# Patient Record
Sex: FEMALE | Race: WHITE | Employment: OTHER | URBAN - METROPOLITAN AREA
[De-identification: names, ages, dates, MRNs, and addresses within clinical notes are randomized per-mention and may not be internally consistent; named-entity substitution may affect disease eponyms.]

---

## 2017-01-10 ENCOUNTER — HOSPITAL ENCOUNTER (EMERGENCY)
Facility: HOSPITAL | Age: 82
Discharge: HOME | End: 2017-01-11
Attending: EMERGENCY MEDICINE
Payer: MEDICARE

## 2017-01-10 ENCOUNTER — APPOINTMENT (OUTPATIENT)
Dept: GENERAL RADIOLOGY | Facility: HOSPITAL | Age: 82
End: 2017-01-10
Attending: EMERGENCY MEDICINE
Payer: MEDICARE

## 2017-01-10 ENCOUNTER — APPOINTMENT (OUTPATIENT)
Dept: GENERAL RADIOLOGY | Facility: HOSPITAL | Age: 82
End: 2017-01-10
Payer: MEDICARE

## 2017-01-10 DIAGNOSIS — S52.501A CLOSED FRACTURE OF DISTAL END OF RIGHT RADIUS, UNSPECIFIED FRACTURE MORPHOLOGY, INITIAL ENCOUNTER: Primary | ICD-10-CM

## 2017-01-10 PROCEDURE — 73130 X-RAY EXAM OF HAND: CPT

## 2017-01-10 PROCEDURE — 99284 EMERGENCY DEPT VISIT MOD MDM: CPT

## 2017-01-10 PROCEDURE — 73110 X-RAY EXAM OF WRIST: CPT

## 2017-01-10 PROCEDURE — 96374 THER/PROPH/DIAG INJ IV PUSH: CPT

## 2017-01-10 RX ORDER — ONDANSETRON 2 MG/ML
4 INJECTION INTRAMUSCULAR; INTRAVENOUS ONCE
Status: COMPLETED | OUTPATIENT
Start: 2017-01-10 | End: 2017-01-10

## 2017-01-11 ENCOUNTER — APPOINTMENT (OUTPATIENT)
Dept: GENERAL RADIOLOGY | Facility: HOSPITAL | Age: 82
End: 2017-01-11
Attending: EMERGENCY MEDICINE
Payer: MEDICARE

## 2017-01-11 ENCOUNTER — APPOINTMENT (OUTPATIENT)
Dept: CT IMAGING | Facility: HOSPITAL | Age: 82
End: 2017-01-11
Attending: EMERGENCY MEDICINE
Payer: MEDICARE

## 2017-01-11 VITALS
TEMPERATURE: 98 F | DIASTOLIC BLOOD PRESSURE: 68 MMHG | HEART RATE: 85 BPM | WEIGHT: 150 LBS | SYSTOLIC BLOOD PRESSURE: 127 MMHG | OXYGEN SATURATION: 96 % | RESPIRATION RATE: 18 BRPM | BODY MASS INDEX: 24.11 KG/M2 | HEIGHT: 66 IN

## 2017-01-11 LAB
ALBUMIN SERPL-MCNC: 3.4 G/DL (ref 3.5–4.8)
ALP LIVER SERPL-CCNC: 74 U/L (ref 55–142)
ALT SERPL-CCNC: 15 U/L (ref 14–54)
AST SERPL-CCNC: 16 U/L (ref 15–41)
BASOPHILS # BLD AUTO: 0.04 X10(3) UL (ref 0–0.1)
BASOPHILS NFR BLD AUTO: 0.5 %
BILIRUB SERPL-MCNC: 0.3 MG/DL (ref 0.1–2)
BUN BLD-MCNC: 34 MG/DL (ref 8–20)
CALCIUM BLD-MCNC: 8.5 MG/DL (ref 8.3–10.3)
CHLORIDE: 103 MMOL/L (ref 101–111)
CO2: 31 MMOL/L (ref 22–32)
CREAT BLD-MCNC: 1.58 MG/DL (ref 0.55–1.02)
EOSINOPHIL # BLD AUTO: 0.3 X10(3) UL (ref 0–0.3)
EOSINOPHIL NFR BLD AUTO: 4.1 %
ERYTHROCYTE [DISTWIDTH] IN BLOOD BY AUTOMATED COUNT: 14.4 % (ref 11.5–16)
GLUCOSE BLD-MCNC: 144 MG/DL (ref 70–99)
HCT VFR BLD AUTO: 35 % (ref 34–50)
HGB BLD-MCNC: 11.5 G/DL (ref 12–16)
IMMATURE GRANULOCYTE COUNT: 0.04 X10(3) UL (ref 0–1)
IMMATURE GRANULOCYTE RATIO %: 0.5 %
INR BLD: 2 (ref 0.89–1.12)
LYMPHOCYTES # BLD AUTO: 1.38 X10(3) UL (ref 0.9–4)
LYMPHOCYTES NFR BLD AUTO: 18.8 %
M PROTEIN MFR SERPL ELPH: 6.6 G/DL (ref 6.1–8.3)
MCH RBC QN AUTO: 28.5 PG (ref 27–33.2)
MCHC RBC AUTO-ENTMCNC: 32.9 G/DL (ref 31–37)
MCV RBC AUTO: 86.6 FL (ref 81–100)
MONOCYTES # BLD AUTO: 0.6 X10(3) UL (ref 0.1–0.6)
MONOCYTES NFR BLD AUTO: 8.2 %
NEUTROPHIL ABS PRELIM: 5 X10 (3) UL (ref 1.3–6.7)
NEUTROPHILS # BLD AUTO: 5 X10(3) UL (ref 1.3–6.7)
NEUTROPHILS NFR BLD AUTO: 67.9 %
PLATELET # BLD AUTO: 170 10(3)UL (ref 150–450)
POTASSIUM SERPL-SCNC: 4.2 MMOL/L (ref 3.6–5.1)
PSA SERPL DL<=0.01 NG/ML-MCNC: 23.4 SECONDS (ref 12.3–14.8)
RBC # BLD AUTO: 4.04 X10(6)UL (ref 3.8–5.1)
RED CELL DISTRIBUTION WIDTH-SD: 45.4 FL (ref 35.1–46.3)
SODIUM SERPL-SCNC: 138 MMOL/L (ref 136–144)
WBC # BLD AUTO: 7.4 X10(3) UL (ref 4–13)

## 2017-01-11 PROCEDURE — 70450 CT HEAD/BRAIN W/O DYE: CPT

## 2017-01-11 PROCEDURE — 85025 COMPLETE CBC W/AUTO DIFF WBC: CPT | Performed by: EMERGENCY MEDICINE

## 2017-01-11 PROCEDURE — 80053 COMPREHEN METABOLIC PANEL: CPT | Performed by: EMERGENCY MEDICINE

## 2017-01-11 PROCEDURE — 85610 PROTHROMBIN TIME: CPT | Performed by: EMERGENCY MEDICINE

## 2017-01-11 RX ORDER — HYDROCODONE BITARTRATE AND ACETAMINOPHEN 5; 325 MG/1; MG/1
1 TABLET ORAL EVERY 6 HOURS PRN
Qty: 20 TABLET | Refills: 0 | Status: SHIPPED | OUTPATIENT
Start: 2017-01-11 | End: 2017-01-17

## 2017-01-11 NOTE — ED INITIAL ASSESSMENT (HPI)
Pt tripped in hallway and landed on right hand. Pain to right wrist. Denies loss of consciousness or head injury.

## 2017-01-11 NOTE — ED PROVIDER NOTES
Patient Seen in: [facility] Emergency Department    History   Patient presents with:  Fall (musculoskeletal, neurologic)    Stated Complaint: fall    HPI    The patient is an 42-year-old female who is on Coumadin for atrial fibrillation, complaining that seems to be an adenoma and  is stable on CT scans   • HIGH BLOOD PRESSURE    • COPD    • Visual impairment    • Osteoarthrosis, unspecified whether generalized or localized, unspecified site    • Diverticulosis of colon (without mention of hemorrhage) 40mg (4 tabs) tonight then 4 tabs tomorrow twice daily x 1 day, then 3 tabs twice daily x 3 days, then 4 tabs daily x 3 days, then 2 tabs daily x 3 days, then 1 tab daily x 1 day, then STOP. ALWAYS TAKE WITH FOOD.    DULoxetine HCl 30 MG Oral Cap DR Toribio reviewed. All other systems reviewed and negative except as noted above. PSFH elements reviewed from today and agreed except as otherwise stated in HPI.     Physical Exam       ED Triage Vitals   BP 01/10/17 2339 127/68 mmHg   Pulse 01/10/17 2337 85 COMP METABOLIC PANEL (14) - Abnormal; Notable for the following:     Glucose 144 (*)     BUN 34 (*)     Creatinine 1.58 (*)     GFR 29 (*)     Albumin 3.4 (*)     All other components within normal limits   PROTHROMBIN TIME (PT) - Abnormal; Notable for t is stable for outpatient referral to orthopedics. She was provided prescription for Norco which she has tolerated in the past, she was encouraged to return if her symptoms worsen, otherwise she will follow-up with orthopedics as an outpatient.   She was [facility]

## 2017-01-24 PROBLEM — S52.501D CLOSED FRACTURE OF DISTAL END OF RIGHT RADIUS WITH ROUTINE HEALING, UNSPECIFIED FRACTURE MORPHOLOGY, SUBSEQUENT ENCOUNTER: Status: ACTIVE | Noted: 2017-01-24

## 2017-01-25 ENCOUNTER — TELEPHONE (OUTPATIENT)
Dept: INTERNAL MEDICINE CLINIC | Facility: CLINIC | Age: 82
End: 2017-01-25

## 2017-01-26 NOTE — TELEPHONE ENCOUNTER
LFV:10/11/16 with TB  Future Appt: none on file  Last Rx:1/24/17 for #30 Dr. Farhana Roach  Last Labs:1/11/17 cbc and cmp abnormal  Per protocol called Raj Mata, she indicated that they just picked up the prescription and filled from Farhana Roach yesterday, advised cannot r

## 2017-02-09 RX ORDER — HYDROCODONE BITARTRATE AND ACETAMINOPHEN 5; 300 MG/1; MG/1
1 TABLET ORAL EVERY 8 HOURS PRN
Qty: 90 TABLET | Refills: 0 | Status: SHIPPED | OUTPATIENT
Start: 2017-02-09 | End: 2017-03-09

## 2017-02-09 NOTE — TELEPHONE ENCOUNTER
LFV:10/11/16 with TB  Future Appt: none on file  Last Rx:12/27/16 for 90 days  Last Labs:1/11/17 cbc and cmp abnormal  Per protocol to provider

## 2017-03-09 RX ORDER — HYDROCODONE BITARTRATE AND ACETAMINOPHEN 5; 300 MG/1; MG/1
1 TABLET ORAL EVERY 8 HOURS PRN
Qty: 90 TABLET | Refills: 0 | Status: SHIPPED | OUTPATIENT
Start: 2017-03-09 | End: 2017-04-07

## 2017-03-09 NOTE — TELEPHONE ENCOUNTER
LFV:10/11/16 with TB  Future Appt: none on file  Last Rx:2/9/17 for 90 days  Last Labs:1/11/17 cbc and cmp abnormal  Per protocol to provider

## 2017-03-14 ENCOUNTER — PRIOR ORIGINAL RECORDS (OUTPATIENT)
Dept: OTHER | Age: 82
End: 2017-03-14

## 2017-03-27 RX ORDER — ONDANSETRON 4 MG/1
TABLET, FILM COATED ORAL
Qty: 30 TABLET | Refills: 3 | Status: SHIPPED | OUTPATIENT
Start: 2017-03-27 | End: 2017-07-11

## 2017-04-05 ENCOUNTER — TELEPHONE (OUTPATIENT)
Dept: INTERNAL MEDICINE CLINIC | Facility: CLINIC | Age: 82
End: 2017-04-05

## 2017-04-05 RX ORDER — HYDROCODONE BITARTRATE AND ACETAMINOPHEN 5; 300 MG/1; MG/1
1 TABLET ORAL EVERY 8 HOURS PRN
Qty: 90 TABLET | Refills: 0 | OUTPATIENT
Start: 2017-04-05

## 2017-04-05 NOTE — TELEPHONE ENCOUNTER
LOV-10/11/16 TB  FOV-none  LAST RX-3/19/17 90 tabs 0 refills  LAST LABS-1/11/17  PER PROTOCOL-to provider.

## 2017-04-05 NOTE — TELEPHONE ENCOUNTER
CG note says 3/19 last refill and it was actually 3/9 so will fill it Friday  Still due for appointment though

## 2017-04-06 NOTE — TELEPHONE ENCOUNTER
Future Appointments  Date Time Provider Radha Mckeon   4/13/2017 4:30 PM Lyubov Dhillon MD [facility]

## 2017-04-07 ENCOUNTER — TELEPHONE (OUTPATIENT)
Dept: INTERNAL MEDICINE CLINIC | Facility: CLINIC | Age: 82
End: 2017-04-07

## 2017-04-07 RX ORDER — HYDROCODONE BITARTRATE AND ACETAMINOPHEN 5; 300 MG/1; MG/1
1 TABLET ORAL EVERY 8 HOURS PRN
Qty: 90 TABLET | Refills: 0 | Status: SHIPPED | OUTPATIENT
Start: 2017-04-07 | End: 2017-05-12

## 2017-04-07 NOTE — TELEPHONE ENCOUNTER
Daughter Bluford Duane [phone] calling to  on request for script. Let her know it was being looked at.

## 2017-04-19 NOTE — TELEPHONE ENCOUNTER
Pt's daughter called to let us know she is currently in rehab and is not able to  the script  PLEASE leave it and she will come get it as soon as she is able

## 2017-04-20 NOTE — TELEPHONE ENCOUNTER
Pt states she can't get anyone to  the prescription. Pt notified the script has bee mailed to her home. Pt verbalizes understanding.

## 2017-04-20 NOTE — TELEPHONE ENCOUNTER
Patient calling stating that her daughter is in rehab informed patient that daughter called yesterday and will be picking up as soon as she can. Patient states that she needs her pills now and she is unable to come and get the script.  Wants to know if it c

## 2017-04-20 NOTE — TELEPHONE ENCOUNTER
Spoke to pt to let her know her script is at the  for p/u. Explained we cannot call this type of medication in to the pharmacy.   Pt states her dtr is currently in Rehab, was supposed to come home this weekend but doctor told her she isn't making

## 2017-04-20 NOTE — TELEPHONE ENCOUNTER
If she is in rehab, the rehab doctor should be ordering her chronic medications including pain medications.

## 2017-05-01 RX ORDER — IPRATROPIUM/ALBUTEROL SULFATE 20-100 MCG
MIST INHALER (GRAM) INHALATION
Qty: 1 INHALER | Refills: 2 | Status: SHIPPED | OUTPATIENT
Start: 2017-05-01 | End: 2018-05-04

## 2017-05-01 RX ORDER — DULOXETIN HYDROCHLORIDE 30 MG/1
CAPSULE, DELAYED RELEASE ORAL
Qty: 90 CAPSULE | Refills: 0 | Status: ON HOLD | OUTPATIENT
Start: 2017-05-01 | End: 2017-09-10 | Stop reason: DRUGHIGH

## 2017-05-05 ENCOUNTER — TELEPHONE (OUTPATIENT)
Dept: INTERNAL MEDICINE CLINIC | Facility: CLINIC | Age: 82
End: 2017-05-05

## 2017-05-05 RX ORDER — FOLIC ACID 1 MG/1
TABLET ORAL
Qty: 90 TABLET | Refills: 0 | Status: SHIPPED | OUTPATIENT
Start: 2017-05-05 | End: 2017-07-31

## 2017-05-12 ENCOUNTER — TELEPHONE (OUTPATIENT)
Dept: INTERNAL MEDICINE CLINIC | Facility: CLINIC | Age: 82
End: 2017-05-12

## 2017-05-12 RX ORDER — HYDROCODONE BITARTRATE AND ACETAMINOPHEN 5; 300 MG/1; MG/1
1 TABLET ORAL EVERY 8 HOURS PRN
Qty: 90 TABLET | Refills: 0 | Status: SHIPPED | OUTPATIENT
Start: 2017-05-12 | End: 2017-06-13

## 2017-05-12 NOTE — TELEPHONE ENCOUNTER
Pt is here ( at ) stating that she never received prescription and would like to have TB reprint Rx for Hydrocodone-Acetaminophen 5-300 MG Oral Tab and discard the old prescription of received.

## 2017-05-31 RX ORDER — FOLIC ACID 1 MG/1
TABLET ORAL
Qty: 90 TABLET | Refills: 0 | OUTPATIENT
Start: 2017-05-31

## 2017-06-13 RX ORDER — HYDROCODONE BITARTRATE AND ACETAMINOPHEN 5; 300 MG/1; MG/1
1 TABLET ORAL EVERY 8 HOURS PRN
Qty: 90 TABLET | Refills: 0 | Status: SHIPPED | OUTPATIENT
Start: 2017-06-13 | End: 2017-07-12

## 2017-06-13 NOTE — TELEPHONE ENCOUNTER
LOV: 10/11/16 w/ TB TCM  FOV: NFV  Last labs: 1/11/17 CBC,CMP,PT  Last Refill: 5/12/17 qt:90  Per protocol sent to provider

## 2017-06-13 NOTE — TELEPHONE ENCOUNTER
Prescription was print and signed. ..  I called pt and gave message to daughter she stated she would come and  tomorrow

## 2017-07-12 RX ORDER — ONDANSETRON 4 MG/1
TABLET, FILM COATED ORAL
Qty: 30 TABLET | Refills: 2 | Status: SHIPPED | OUTPATIENT
Start: 2017-07-12 | End: 2017-08-31

## 2017-07-12 NOTE — TELEPHONE ENCOUNTER
I will give refill tomorrow when I am in office but needs to make 30 min f/u within a month, last OV many months ago

## 2017-07-12 NOTE — TELEPHONE ENCOUNTER
LOV: 10/11/16  Future office visit: No upcoming visit   Last labs: 1/11/17 Cmp Cbc   Last RX: 3/27/17 #30 #3 Refills  Per protocol: Route to provider

## 2017-07-12 NOTE — TELEPHONE ENCOUNTER
LOV: 10/11/16  Future office visit: no upcoming visit   Last labs: 1/11/17 Cmp Cbc   Last RX: 6/13/17 #90 No Refills  Per protocol: Route to provider

## 2017-07-13 RX ORDER — HYDROCODONE BITARTRATE AND ACETAMINOPHEN 5; 300 MG/1; MG/1
1 TABLET ORAL EVERY 8 HOURS PRN
Qty: 90 TABLET | Refills: 0 | Status: SHIPPED | OUTPATIENT
Start: 2017-07-13 | End: 2017-08-14

## 2017-07-13 NOTE — TELEPHONE ENCOUNTER
Future Appointments  Date Time Provider Radha Mckeon   7/31/2017 2:45 PM Bryn Thakur MD [facility]     GABRIELLA:[id number]

## 2017-07-31 ENCOUNTER — OFFICE VISIT (OUTPATIENT)
Dept: INTERNAL MEDICINE CLINIC | Facility: CLINIC | Age: 82
End: 2017-07-31

## 2017-07-31 ENCOUNTER — LABORATORY ENCOUNTER (OUTPATIENT)
Dept: LAB | Age: 82
End: 2017-07-31
Attending: INTERNAL MEDICINE
Payer: MEDICARE

## 2017-07-31 VITALS
WEIGHT: 150 LBS | TEMPERATURE: 99 F | DIASTOLIC BLOOD PRESSURE: 68 MMHG | RESPIRATION RATE: 17 BRPM | BODY MASS INDEX: 24.69 KG/M2 | SYSTOLIC BLOOD PRESSURE: 134 MMHG | HEIGHT: 65.5 IN | OXYGEN SATURATION: 97 % | HEART RATE: 61 BPM

## 2017-07-31 DIAGNOSIS — N18.30 CKD (CHRONIC KIDNEY DISEASE) STAGE 3, GFR 30-59 ML/MIN (HCC): ICD-10-CM

## 2017-07-31 DIAGNOSIS — E11.40 CONTROLLED TYPE 2 DIABETES WITH NEUROPATHY (HCC): ICD-10-CM

## 2017-07-31 DIAGNOSIS — I48.20 CHRONIC ATRIAL FIBRILLATION (HCC): ICD-10-CM

## 2017-07-31 DIAGNOSIS — E11.40 CONTROLLED TYPE 2 DIABETES WITH NEUROPATHY (HCC): Primary | ICD-10-CM

## 2017-07-31 DIAGNOSIS — I10 ESSENTIAL HYPERTENSION: ICD-10-CM

## 2017-07-31 DIAGNOSIS — J44.9 CHRONIC OBSTRUCTIVE PULMONARY DISEASE, UNSPECIFIED COPD TYPE (HCC): ICD-10-CM

## 2017-07-31 LAB
ALBUMIN SERPL-MCNC: 3 G/DL (ref 3.5–4.8)
ALP LIVER SERPL-CCNC: 77 U/L (ref 55–142)
ALT SERPL-CCNC: 14 U/L (ref 14–54)
AST SERPL-CCNC: 14 U/L (ref 15–41)
BILIRUB SERPL-MCNC: 0.3 MG/DL (ref 0.1–2)
BUN BLD-MCNC: 25 MG/DL (ref 8–20)
CALCIUM BLD-MCNC: 9.5 MG/DL (ref 8.3–10.3)
CHLORIDE: 102 MMOL/L (ref 101–111)
CO2: 31 MMOL/L (ref 22–32)
CREAT BLD-MCNC: 1.36 MG/DL (ref 0.55–1.02)
CREAT UR-SCNC: 210 MG/DL
EST. AVERAGE GLUCOSE BLD GHB EST-MCNC: 114 MG/DL (ref 68–126)
GLUCOSE BLD-MCNC: 132 MG/DL (ref 70–99)
HBA1C MFR BLD HPLC: 5.6 % (ref ?–5.7)
INR BLD: 1.51 (ref 0.89–1.11)
M PROTEIN MFR SERPL ELPH: 6.4 G/DL (ref 6.1–8.3)
MICROALBUMIN UR-MCNC: 3.36 MG/DL
MICROALBUMIN/CREAT 24H UR-RTO: 16 UG/MG (ref ?–30)
POTASSIUM SERPL-SCNC: 5.1 MMOL/L (ref 3.6–5.1)
PSA SERPL DL<=0.01 NG/ML-MCNC: 18.4 SECONDS (ref 12–14.3)
SODIUM SERPL-SCNC: 139 MMOL/L (ref 136–144)

## 2017-07-31 PROCEDURE — 85610 PROTHROMBIN TIME: CPT

## 2017-07-31 PROCEDURE — 99214 OFFICE O/P EST MOD 30 MIN: CPT | Performed by: INTERNAL MEDICINE

## 2017-07-31 PROCEDURE — 82043 UR ALBUMIN QUANTITATIVE: CPT

## 2017-07-31 PROCEDURE — 82570 ASSAY OF URINE CREATININE: CPT

## 2017-07-31 PROCEDURE — 83036 HEMOGLOBIN GLYCOSYLATED A1C: CPT

## 2017-07-31 PROCEDURE — 80053 COMPREHEN METABOLIC PANEL: CPT

## 2017-07-31 RX ORDER — HYDROCODONE BITARTRATE AND ACETAMINOPHEN 5; 300 MG/1; MG/1
1 TABLET ORAL EVERY 8 HOURS PRN
Qty: 90 TABLET | Refills: 0 | Status: CANCELLED | OUTPATIENT
Start: 2017-07-31

## 2017-07-31 NOTE — PROGRESS NOTES
Boone Donaldson is a 80year old female. Patient presents with:   Follow - Up: for pain med  Atrial Fibrillation: needs inr done  Diabetes      HPI:     Patient with chronic a fib, COPD on oxygen, DM2 with chronic LE pain/neuropathy here after 9 months fo thoracic vertebra (HCC)     Hip pain     Adenoma of right adrenal gland     Acute bronchitis     Benign essential HTN     Diabetes mellitus type 2 in nonobese (HCC)     At risk for falling     CKD (chronic kidney disease) stage 3, GFR 30-59 ml/min     Hist (VITAMIN D) 1000 UNITS Oral Tab Take 1 tablet by mouth daily. Disp:  Rfl:    Warfarin Sodium (COUMADIN) 5 MG Oral Tab Take 1 tablet by mouth As Directed.  MON, TUES, WED, THURS, FRI, SAT  Disp:  Rfl:    Warfarin Sodium 2.5 MG Oral Tab Take 1 tablet by mouth • Pacemaker     Pacemaker placed in 2004 at [facility]   • PONV (postoperative nausea and vomiting)    • Right adrenal mass ([address]     Dx in 2011: right adrenal mass measuring between 1 and 1.5 that seems to be an adenoma and  is stable on CT scans   • Subcli 65.5\"   Wt 150 lb   SpO2 97%   BMI 24.58 kg/m²   GENERAL: well developed, well nourished,in no apparent distress  SKIN: eczema back   HEENT: atraumatic, normocephalic,ears and throat are clear  NECK: supple,no adenopathy  LUNGS: dec BS b/l, wearing oxygen

## 2017-08-14 RX ORDER — HYDROCODONE BITARTRATE AND ACETAMINOPHEN 5; 300 MG/1; MG/1
1 TABLET ORAL EVERY 8 HOURS PRN
Qty: 90 TABLET | Refills: 0 | Status: SHIPPED | OUTPATIENT
Start: 2017-08-14 | End: 2017-09-18

## 2017-08-14 NOTE — TELEPHONE ENCOUNTER
Med pended  Per protocol to provider    LOV: 7/31/17- with TB for diabetes  Last rx: 7/13/17. 90 tablets with 0 refills

## 2017-08-14 NOTE — TELEPHONE ENCOUNTER
Daughter Vergia Samples called stating pt needs new rx for Hydrocodone-Acetaminophen 5-300 MG Oral Tab and Vergia Samples will be here tomorrow and she will pick it up then

## 2017-08-16 ENCOUNTER — APPOINTMENT (OUTPATIENT)
Dept: GENERAL RADIOLOGY | Facility: HOSPITAL | Age: 82
End: 2017-08-16
Attending: EMERGENCY MEDICINE
Payer: MEDICARE

## 2017-08-16 ENCOUNTER — HOSPITAL ENCOUNTER (EMERGENCY)
Facility: HOSPITAL | Age: 82
Discharge: HOME | End: 2017-08-16
Attending: EMERGENCY MEDICINE
Payer: MEDICARE

## 2017-08-16 VITALS
HEART RATE: 62 BPM | SYSTOLIC BLOOD PRESSURE: 119 MMHG | BODY MASS INDEX: 25 KG/M2 | WEIGHT: 149.94 LBS | RESPIRATION RATE: 16 BRPM | TEMPERATURE: 98 F | OXYGEN SATURATION: 95 % | DIASTOLIC BLOOD PRESSURE: 58 MMHG

## 2017-08-16 DIAGNOSIS — N39.0 URINARY TRACT INFECTION, SITE UNSPECIFIED: Primary | ICD-10-CM

## 2017-08-16 LAB
ALBUMIN SERPL-MCNC: 2.9 G/DL (ref 3.5–4.8)
ALP LIVER SERPL-CCNC: 76 U/L (ref 55–142)
ALT SERPL-CCNC: 11 U/L (ref 14–54)
AST SERPL-CCNC: 17 U/L (ref 15–41)
ATRIAL RATE: 62 BPM
BASOPHILS # BLD AUTO: 0.04 X10(3) UL (ref 0–0.1)
BASOPHILS NFR BLD AUTO: 0.6 %
BILIRUB SERPL-MCNC: 0.5 MG/DL (ref 0.1–2)
BILIRUB UR QL STRIP.AUTO: NEGATIVE
BUN BLD-MCNC: 25 MG/DL (ref 8–20)
CALCIUM BLD-MCNC: 8.9 MG/DL (ref 8.3–10.3)
CHLORIDE: 103 MMOL/L (ref 101–111)
CO2: 31 MMOL/L (ref 22–32)
COLOR UR AUTO: YELLOW
CREAT BLD-MCNC: 1.16 MG/DL (ref 0.55–1.02)
EOSINOPHIL # BLD AUTO: 0.37 X10(3) UL (ref 0–0.3)
EOSINOPHIL NFR BLD AUTO: 5.9 %
ERYTHROCYTE [DISTWIDTH] IN BLOOD BY AUTOMATED COUNT: 13.6 % (ref 11.5–16)
GLUCOSE BLD-MCNC: 103 MG/DL (ref 70–99)
GLUCOSE UR STRIP.AUTO-MCNC: NEGATIVE MG/DL
HCT VFR BLD AUTO: 34.4 % (ref 34–50)
HGB BLD-MCNC: 11.2 G/DL (ref 12–16)
HYALINE CASTS #/AREA URNS AUTO: PRESENT /LPF
IMMATURE GRANULOCYTE COUNT: 0.03 X10(3) UL (ref 0–1)
IMMATURE GRANULOCYTE RATIO %: 0.5 %
KETONES UR STRIP.AUTO-MCNC: NEGATIVE MG/DL
LIPASE: 258 U/L (ref 73–393)
LYMPHOCYTES # BLD AUTO: 1.14 X10(3) UL (ref 0.9–4)
LYMPHOCYTES NFR BLD AUTO: 18.1 %
M PROTEIN MFR SERPL ELPH: 5.9 G/DL (ref 6.1–8.3)
MCH RBC QN AUTO: 28.4 PG (ref 27–33.2)
MCHC RBC AUTO-ENTMCNC: 32.6 G/DL (ref 31–37)
MCV RBC AUTO: 87.3 FL (ref 81–100)
MONOCYTES # BLD AUTO: 0.74 X10(3) UL (ref 0.1–0.6)
MONOCYTES NFR BLD AUTO: 11.7 %
NEUTROPHIL ABS PRELIM: 3.98 X10 (3) UL (ref 1.3–6.7)
NEUTROPHILS # BLD AUTO: 3.98 X10(3) UL (ref 1.3–6.7)
NEUTROPHILS NFR BLD AUTO: 63.2 %
NITRITE UR QL STRIP.AUTO: NEGATIVE
P AXIS: 103 DEGREES
P-R INTERVAL: 170 MS
PH UR STRIP.AUTO: 5 [PH] (ref 4.5–8)
PLATELET # BLD AUTO: 198 10(3)UL (ref 150–450)
POTASSIUM SERPL-SCNC: 4.3 MMOL/L (ref 3.6–5.1)
PROT UR STRIP.AUTO-MCNC: NEGATIVE MG/DL
Q-T INTERVAL: 476 MS
QRS DURATION: 146 MS
QTC CALCULATION (BEZET): 487 MS
R AXIS: -71 DEGREES
RBC # BLD AUTO: 3.94 X10(6)UL (ref 3.8–5.1)
RED CELL DISTRIBUTION WIDTH-SD: 43.2 FL (ref 35.1–46.3)
SODIUM SERPL-SCNC: 140 MMOL/L (ref 136–144)
SP GR UR STRIP.AUTO: 1.02 (ref 1–1.03)
T AXIS: 108 DEGREES
TROPONIN: <0.046 NG/ML (ref ?–0.05)
UROBILINOGEN UR STRIP.AUTO-MCNC: <2 MG/DL
VENTRICULAR RATE: 63 BPM
WBC # BLD AUTO: 6.3 X10(3) UL (ref 4–13)

## 2017-08-16 PROCEDURE — 85025 COMPLETE CBC W/AUTO DIFF WBC: CPT | Performed by: EMERGENCY MEDICINE

## 2017-08-16 PROCEDURE — 93010 ELECTROCARDIOGRAM REPORT: CPT

## 2017-08-16 PROCEDURE — 80053 COMPREHEN METABOLIC PANEL: CPT | Performed by: EMERGENCY MEDICINE

## 2017-08-16 PROCEDURE — 87077 CULTURE AEROBIC IDENTIFY: CPT | Performed by: EMERGENCY MEDICINE

## 2017-08-16 PROCEDURE — 84484 ASSAY OF TROPONIN QUANT: CPT | Performed by: EMERGENCY MEDICINE

## 2017-08-16 PROCEDURE — 87086 URINE CULTURE/COLONY COUNT: CPT | Performed by: EMERGENCY MEDICINE

## 2017-08-16 PROCEDURE — 99285 EMERGENCY DEPT VISIT HI MDM: CPT

## 2017-08-16 PROCEDURE — 93005 ELECTROCARDIOGRAM TRACING: CPT

## 2017-08-16 PROCEDURE — 96374 THER/PROPH/DIAG INJ IV PUSH: CPT

## 2017-08-16 PROCEDURE — 71010 XR CHEST AP PORTABLE  (CPT=71010): CPT | Performed by: EMERGENCY MEDICINE

## 2017-08-16 PROCEDURE — 81001 URINALYSIS AUTO W/SCOPE: CPT | Performed by: EMERGENCY MEDICINE

## 2017-08-16 PROCEDURE — 87186 SC STD MICRODIL/AGAR DIL: CPT | Performed by: EMERGENCY MEDICINE

## 2017-08-16 PROCEDURE — 83690 ASSAY OF LIPASE: CPT | Performed by: EMERGENCY MEDICINE

## 2017-08-16 RX ORDER — CEPHALEXIN 500 MG/1
500 CAPSULE ORAL 4 TIMES DAILY
Qty: 40 CAPSULE | Refills: 0 | Status: SHIPPED | OUTPATIENT
Start: 2017-08-16 | End: 2017-08-26

## 2017-08-16 RX ORDER — ONDANSETRON 2 MG/ML
4 INJECTION INTRAMUSCULAR; INTRAVENOUS ONCE
Status: COMPLETED | OUTPATIENT
Start: 2017-08-16 | End: 2017-08-16

## 2017-08-16 NOTE — ED INITIAL ASSESSMENT (HPI)
Pt states she has had nausea and abdominal pain for several months. Pt seen by PMD and given zofran for sx management.  Denies constipation or diarrhea or urinary sx

## 2017-08-16 NOTE — ED PROVIDER NOTES
Patient Seen in: [facility] Emergency Department    History   Patient presents with:  Abdomen/Flank Pain (GI/)    Stated Complaint: Nausea  HPI    Patient is here for chronic complaints. Patient has had some nausea for several months.   But no vom U/S showed right upper nodule of 1.1 cm, right midpole nodule of 3.2 cm and left inferior nodule of 1.6 cm with microcalcifications .  Biopsy of all three benign   • Osteoarthrosis, unspecified whether generalized or localized, unspecified site    • Pacemak (four) hours as needed for Wheezing or Shortness of Breath.    METFORMIN HCL 1000 MG Oral Tab,  TAKE ONE TABLET BY MOUTH TWO TIMES A DAY WITH MEALS   Pantoprazole Sodium 40 MG Oral Tab EC,  Take 1 tablet (40 mg total) by mouth every morning before breakfast Triage Vitals [08/16/17 0329]  BP: 113/60  Pulse: 58  Resp: 17  Temp: 98.3 °F (36.8 °C)  Temp src: Temporal  SpO2: 93 %  O2 Device: None (Room air)    Current:/58   Pulse 62   Temp 98.3 °F (36.8 °C) (Temporal)   Resp 16   Wt 68 kg   SpO2 95%   BMI 24 W/ DIFFERENTIAL - Abnormal; Notable for the following:     HGB 11.2 (*)     Monocyte Absolute 0.74 (*)     Eosinophil Absolute 0.37 (*)     All other components within normal limits   LIPASE - Normal   TROPONIN I - Normal   CBC WITH DIFFERENTIAL WITH PLATE have any concerns      Medications Prescribed:  Current Discharge Medication List    START taking these medications    cephALEXin 500 MG Oral Cap  Take 1 capsule (500 mg total) by mouth 4 (four) times daily.   Qty: 40 capsule Refills: 0

## 2017-08-17 ENCOUNTER — TELEPHONE (OUTPATIENT)
Dept: INTERNAL MEDICINE CLINIC | Facility: CLINIC | Age: 82
End: 2017-08-17

## 2017-08-17 NOTE — TELEPHONE ENCOUNTER
Patient's dtr trying to help her mother feel better and not sure where to go from here.   Dtr states pt seems more depressed, spends her days in bed because she says its warm and shes the most comfortable there, nausea but the Zofran helps, doesn't eat much

## 2017-08-31 ENCOUNTER — OFFICE VISIT (OUTPATIENT)
Dept: INTERNAL MEDICINE CLINIC | Facility: CLINIC | Age: 82
End: 2017-08-31

## 2017-08-31 VITALS
BODY MASS INDEX: 20.83 KG/M2 | TEMPERATURE: 68 F | HEIGHT: 65 IN | RESPIRATION RATE: 16 BRPM | HEART RATE: 99 BPM | DIASTOLIC BLOOD PRESSURE: 60 MMHG | WEIGHT: 125 LBS | SYSTOLIC BLOOD PRESSURE: 102 MMHG

## 2017-08-31 DIAGNOSIS — R63.4 WEIGHT LOSS: ICD-10-CM

## 2017-08-31 DIAGNOSIS — F41.9 ANXIETY AND DEPRESSION: ICD-10-CM

## 2017-08-31 DIAGNOSIS — F32.A ANXIETY AND DEPRESSION: ICD-10-CM

## 2017-08-31 DIAGNOSIS — R11.0 NAUSEA: Primary | ICD-10-CM

## 2017-08-31 PROCEDURE — 99214 OFFICE O/P EST MOD 30 MIN: CPT | Performed by: INTERNAL MEDICINE

## 2017-08-31 RX ORDER — ATENOLOL AND CHLORTHALIDONE 50; 25 MG/1; MG/1
0.5 TABLET ORAL DAILY
Qty: 30 TABLET | Refills: 2 | Status: SHIPPED | OUTPATIENT
Start: 2017-08-31 | End: 2018-01-30

## 2017-08-31 RX ORDER — ONDANSETRON 4 MG/1
TABLET, FILM COATED ORAL
Qty: 30 TABLET | Refills: 2 | Status: SHIPPED | OUTPATIENT
Start: 2017-08-31 | End: 2017-11-24

## 2017-08-31 RX ORDER — DULOXETIN HYDROCHLORIDE 60 MG/1
60 CAPSULE, DELAYED RELEASE ORAL DAILY
Qty: 30 CAPSULE | Refills: 3 | Status: SHIPPED | OUTPATIENT
Start: 2017-08-31 | End: 2018-01-30

## 2017-08-31 NOTE — PROGRESS NOTES
Keshawn Mckinnon is a 80year old female.   Patient presents with:  Depression: pt/ c/o   Weight Loss  Nausea  COPD      HPI:     Patient here with her family for several concerns-  Depression and anxiety worsening over the last 6 months, not able to cook a CKD (chronic kidney disease) stage 3, GFR 30-59 ml/min     History of left breast cancer     Chronic atrial fibrillation (HCC)     Malignant neoplasm of left lung (HCC)     Anxiety     COPD exacerbation (HCC)     Chronic respiratory insufficiency     Type 1   folic acid 1 MG Oral Tab Take 1 mg by mouth daily. Disp:  Rfl:    Budesonide-Formoterol Fumarate (SYMBICORT) 160-4.5 MCG/ACT Inhalation Aerosol Inhale 2 puffs into the lungs 2 (two) times daily.  Disp:  Rfl:    Albuterol Sulfate HFA (PROAIR HFA) 108 (90 (postoperative nausea and vomiting)    • Right adrenal mass ([address]     Dx in 2011: right adrenal mass measuring between 1 and 1.5 that seems to be an adenoma and  is stable on CT scans   • Subclinical hyperthyroidism     Dx in 6/2013 - due to multinodular g supple,no adenopathy  LUNGS: normal rate without respiratory distress, lungs clear to auscultation  CARDIO: nl S1 S2  GI: normal bowel sounds, soft, nt/nd  EXTREMITIES: no cyanosis, clubbing   NEURO: Alert and oriented, no focal deficits    ASSESSMENT AND

## 2017-09-09 ENCOUNTER — HOSPITAL ENCOUNTER (INPATIENT)
Facility: HOSPITAL | Age: 82
LOS: 5 days | Discharge: HOME HEALTH | DRG: 391 | End: 2017-09-15
Attending: EMERGENCY MEDICINE | Admitting: INTERNAL MEDICINE
Payer: MEDICARE

## 2017-09-09 DIAGNOSIS — J15.9 COMMUNITY ACQUIRED BACTERIAL PNEUMONIA: ICD-10-CM

## 2017-09-09 DIAGNOSIS — K52.9 COLITIS: Primary | ICD-10-CM

## 2017-09-09 DIAGNOSIS — J44.9 CHRONIC OBSTRUCTIVE PULMONARY DISEASE, UNSPECIFIED COPD TYPE (HCC): ICD-10-CM

## 2017-09-09 DIAGNOSIS — K92.2 GI (GASTROINTESTINAL BLEED): ICD-10-CM

## 2017-09-09 DIAGNOSIS — R62.51 FAILURE TO THRIVE (0-17): ICD-10-CM

## 2017-09-09 DIAGNOSIS — Z95.0 PACEMAKER: ICD-10-CM

## 2017-09-09 DIAGNOSIS — I48.91 ATRIAL FIBRILLATION, UNSPECIFIED TYPE (HCC): ICD-10-CM

## 2017-09-09 DIAGNOSIS — E11.9 TYPE 2 DIABETES MELLITUS WITHOUT COMPLICATION, WITHOUT LONG-TERM CURRENT USE OF INSULIN (HCC): ICD-10-CM

## 2017-09-09 LAB
ALBUMIN SERPL-MCNC: 2.6 G/DL (ref 3.5–4.8)
ALP LIVER SERPL-CCNC: 78 U/L (ref 55–142)
ALT SERPL-CCNC: 15 U/L (ref 14–54)
AST SERPL-CCNC: 13 U/L (ref 15–41)
BASOPHILS # BLD AUTO: 0.02 X10(3) UL (ref 0–0.1)
BASOPHILS NFR BLD AUTO: 0.3 %
BILIRUB SERPL-MCNC: 0.3 MG/DL (ref 0.1–2)
BUN BLD-MCNC: 26 MG/DL (ref 8–20)
CALCIUM BLD-MCNC: 9.2 MG/DL (ref 8.3–10.3)
CHLORIDE: 100 MMOL/L (ref 101–111)
CO2: 32 MMOL/L (ref 22–32)
CREAT BLD-MCNC: 1.27 MG/DL (ref 0.55–1.02)
EOSINOPHIL # BLD AUTO: 0.26 X10(3) UL (ref 0–0.3)
EOSINOPHIL NFR BLD AUTO: 4.3 %
ERYTHROCYTE [DISTWIDTH] IN BLOOD BY AUTOMATED COUNT: 13.1 % (ref 11.5–16)
FREE T4: 1.3 NG/DL (ref 0.9–1.8)
GLUCOSE BLD-MCNC: 129 MG/DL (ref 70–99)
HCT VFR BLD AUTO: 31.1 % (ref 34–50)
HGB BLD-MCNC: 10 G/DL (ref 12–16)
IMMATURE GRANULOCYTE COUNT: 0.03 X10(3) UL (ref 0–1)
IMMATURE GRANULOCYTE RATIO %: 0.5 %
LYMPHOCYTES # BLD AUTO: 0.86 X10(3) UL (ref 0.9–4)
LYMPHOCYTES NFR BLD AUTO: 14.2 %
M PROTEIN MFR SERPL ELPH: 6.2 G/DL (ref 6.1–8.3)
MCH RBC QN AUTO: 27.5 PG (ref 27–33.2)
MCHC RBC AUTO-ENTMCNC: 32.2 G/DL (ref 31–37)
MCV RBC AUTO: 85.7 FL (ref 81–100)
MONOCYTES # BLD AUTO: 0.49 X10(3) UL (ref 0.1–0.6)
MONOCYTES NFR BLD AUTO: 8.1 %
NEUTROPHIL ABS PRELIM: 4.4 X10 (3) UL (ref 1.3–6.7)
NEUTROPHILS # BLD AUTO: 4.4 X10(3) UL (ref 1.3–6.7)
NEUTROPHILS NFR BLD AUTO: 72.6 %
PLATELET # BLD AUTO: 261 10(3)UL (ref 150–450)
POTASSIUM SERPL-SCNC: 4 MMOL/L (ref 3.6–5.1)
RBC # BLD AUTO: 3.63 X10(6)UL (ref 3.8–5.1)
RED CELL DISTRIBUTION WIDTH-SD: 40.9 FL (ref 35.1–46.3)
SODIUM SERPL-SCNC: 138 MMOL/L (ref 136–144)
TSI SER-ACNC: <0.005 MIU/ML (ref 0.35–5.5)
WBC # BLD AUTO: 6.1 X10(3) UL (ref 4–13)

## 2017-09-09 RX ORDER — SODIUM CHLORIDE 9 MG/ML
1000 INJECTION, SOLUTION INTRAVENOUS ONCE
Status: COMPLETED | OUTPATIENT
Start: 2017-09-09 | End: 2017-09-10

## 2017-09-09 RX ORDER — ONDANSETRON 2 MG/ML
4 INJECTION INTRAMUSCULAR; INTRAVENOUS ONCE
Status: COMPLETED | OUTPATIENT
Start: 2017-09-09 | End: 2017-09-09

## 2017-09-10 ENCOUNTER — APPOINTMENT (OUTPATIENT)
Dept: CT IMAGING | Facility: HOSPITAL | Age: 82
DRG: 391 | End: 2017-09-10
Attending: EMERGENCY MEDICINE
Payer: MEDICARE

## 2017-09-10 ENCOUNTER — APPOINTMENT (OUTPATIENT)
Dept: GENERAL RADIOLOGY | Facility: HOSPITAL | Age: 82
DRG: 391 | End: 2017-09-10
Attending: HOSPITALIST
Payer: MEDICARE

## 2017-09-10 PROBLEM — K52.9 COLITIS: Status: ACTIVE | Noted: 2017-09-10

## 2017-09-10 PROBLEM — E11.9 TYPE 2 DIABETES MELLITUS WITHOUT COMPLICATION, WITHOUT LONG-TERM CURRENT USE OF INSULIN (HCC): Status: ACTIVE | Noted: 2017-09-10

## 2017-09-10 PROBLEM — J44.9 CHRONIC OBSTRUCTIVE PULMONARY DISEASE, UNSPECIFIED COPD TYPE (HCC): Status: ACTIVE | Noted: 2017-09-10

## 2017-09-10 PROBLEM — I48.91 ATRIAL FIBRILLATION, UNSPECIFIED TYPE (HCC): Status: ACTIVE | Noted: 2017-09-10

## 2017-09-10 PROBLEM — R62.51 FAILURE TO THRIVE (0-17): Status: ACTIVE | Noted: 2017-09-10

## 2017-09-10 LAB
ATRIAL RATE: 60 BPM
ATRIAL RATE: 62 BPM
BASOPHILS # BLD AUTO: 0.02 X10(3) UL (ref 0–0.1)
BASOPHILS NFR BLD AUTO: 0.4 %
BILIRUB UR QL STRIP.AUTO: NEGATIVE
BUN BLD-MCNC: 26 MG/DL (ref 8–20)
CALCIUM BLD-MCNC: 9.1 MG/DL (ref 8.3–10.3)
CHLORIDE: 102 MMOL/L (ref 101–111)
CLARITY UR REFRACT.AUTO: CLEAR
CO2: 29 MMOL/L (ref 22–32)
COLOR UR AUTO: YELLOW
CREAT BLD-MCNC: 1.1 MG/DL (ref 0.55–1.02)
D-DIMER: 0.57 UG/ML FEU (ref 0–0.49)
EOSINOPHIL # BLD AUTO: 0.22 X10(3) UL (ref 0–0.3)
EOSINOPHIL NFR BLD AUTO: 4.1 %
ERYTHROCYTE [DISTWIDTH] IN BLOOD BY AUTOMATED COUNT: 13.2 % (ref 11.5–16)
GLUCOSE BLD-MCNC: 106 MG/DL (ref 65–99)
GLUCOSE BLD-MCNC: 78 MG/DL (ref 65–99)
GLUCOSE BLD-MCNC: 83 MG/DL (ref 70–99)
GLUCOSE BLD-MCNC: 87 MG/DL (ref 65–99)
GLUCOSE BLD-MCNC: 93 MG/DL (ref 65–99)
GLUCOSE UR STRIP.AUTO-MCNC: NEGATIVE MG/DL
HCT VFR BLD AUTO: 26.6 % (ref 34–50)
HGB BLD-MCNC: 8.8 G/DL (ref 12–16)
IMMATURE GRANULOCYTE COUNT: 0.03 X10(3) UL (ref 0–1)
IMMATURE GRANULOCYTE RATIO %: 0.6 %
INR BLD: 4.72 (ref 0.89–1.11)
KETONES UR STRIP.AUTO-MCNC: NEGATIVE MG/DL
LYMPHOCYTES # BLD AUTO: 0.79 X10(3) UL (ref 0.9–4)
LYMPHOCYTES NFR BLD AUTO: 14.8 %
MCH RBC QN AUTO: 28.1 PG (ref 27–33.2)
MCHC RBC AUTO-ENTMCNC: 33.1 G/DL (ref 31–37)
MCV RBC AUTO: 85 FL (ref 81–100)
MONOCYTES # BLD AUTO: 0.5 X10(3) UL (ref 0.1–0.6)
MONOCYTES NFR BLD AUTO: 9.4 %
NEUTROPHIL ABS PRELIM: 3.77 X10 (3) UL (ref 1.3–6.7)
NEUTROPHILS # BLD AUTO: 3.77 X10(3) UL (ref 1.3–6.7)
NEUTROPHILS NFR BLD AUTO: 70.7 %
NITRITE UR QL STRIP.AUTO: NEGATIVE
P AXIS: 78 DEGREES
P AXIS: 82 DEGREES
P-R INTERVAL: 156 MS
P-R INTERVAL: 156 MS
PH UR STRIP.AUTO: 6 [PH] (ref 4.5–8)
PLATELET # BLD AUTO: 225 10(3)UL (ref 150–450)
POTASSIUM SERPL-SCNC: 4.2 MMOL/L (ref 3.6–5.1)
PROT UR STRIP.AUTO-MCNC: NEGATIVE MG/DL
PSA SERPL DL<=0.01 NG/ML-MCNC: 45.7 SECONDS (ref 12–14.3)
Q-T INTERVAL: 522 MS
Q-T INTERVAL: 534 MS
QRS DURATION: 172 MS
QRS DURATION: 174 MS
QTC CALCULATION (BEZET): 529 MS
QTC CALCULATION (BEZET): 534 MS
R AXIS: -65 DEGREES
R AXIS: -67 DEGREES
RBC # BLD AUTO: 3.13 X10(6)UL (ref 3.8–5.1)
RBC UR QL AUTO: NEGATIVE
RED CELL DISTRIBUTION WIDTH-SD: 40.7 FL (ref 35.1–46.3)
SODIUM SERPL-SCNC: 138 MMOL/L (ref 136–144)
SP GR UR STRIP.AUTO: 1.02 (ref 1–1.03)
T AXIS: 93 DEGREES
T AXIS: 97 DEGREES
T3 SERPL-MCNC: 100 NG/DL (ref 60–181)
TROPONIN: <0.046 NG/ML (ref ?–0.05)
UROBILINOGEN UR STRIP.AUTO-MCNC: <2 MG/DL
VENTRICULAR RATE: 60 BPM
VENTRICULAR RATE: 62 BPM
WBC # BLD AUTO: 5.3 X10(3) UL (ref 4–13)

## 2017-09-10 PROCEDURE — 71010 XR CHEST AP PORTABLE  (CPT=71010): CPT | Performed by: HOSPITALIST

## 2017-09-10 PROCEDURE — 99222 1ST HOSP IP/OBS MODERATE 55: CPT | Performed by: INTERNAL MEDICINE

## 2017-09-10 PROCEDURE — 74176 CT ABD & PELVIS W/O CONTRAST: CPT | Performed by: EMERGENCY MEDICINE

## 2017-09-10 RX ORDER — ALBUTEROL SULFATE 2.5 MG/3ML
2.5 SOLUTION RESPIRATORY (INHALATION) EVERY 4 HOURS PRN
Status: DISCONTINUED | OUTPATIENT
Start: 2017-09-10 | End: 2017-09-12

## 2017-09-10 RX ORDER — ACETAMINOPHEN 500 MG
1000 TABLET ORAL ONCE
Status: COMPLETED | OUTPATIENT
Start: 2017-09-10 | End: 2017-09-10

## 2017-09-10 RX ORDER — SODIUM PHOSPHATE, DIBASIC AND SODIUM PHOSPHATE, MONOBASIC 7; 19 G/133ML; G/133ML
1 ENEMA RECTAL ONCE AS NEEDED
Status: DISCONTINUED | OUTPATIENT
Start: 2017-09-10 | End: 2017-09-15

## 2017-09-10 RX ORDER — DEXTROSE MONOHYDRATE 25 G/50ML
50 INJECTION, SOLUTION INTRAVENOUS
Status: DISCONTINUED | OUTPATIENT
Start: 2017-09-10 | End: 2017-09-12

## 2017-09-10 RX ORDER — METOCLOPRAMIDE HYDROCHLORIDE 5 MG/ML
10 INJECTION INTRAMUSCULAR; INTRAVENOUS EVERY 6 HOURS PRN
Status: DISCONTINUED | OUTPATIENT
Start: 2017-09-10 | End: 2017-09-11

## 2017-09-10 RX ORDER — METRONIDAZOLE 500 MG/100ML
500 INJECTION, SOLUTION INTRAVENOUS ONCE
Status: COMPLETED | OUTPATIENT
Start: 2017-09-10 | End: 2017-09-10

## 2017-09-10 RX ORDER — ONDANSETRON 2 MG/ML
INJECTION INTRAMUSCULAR; INTRAVENOUS
Status: DISPENSED
Start: 2017-09-10 | End: 2017-09-10

## 2017-09-10 RX ORDER — WARFARIN SODIUM 5 MG/1
2.5 TABLET ORAL
Status: CANCELLED | OUTPATIENT
Start: 2017-09-10

## 2017-09-10 RX ORDER — DULOXETIN HYDROCHLORIDE 30 MG/1
30 CAPSULE, DELAYED RELEASE ORAL
Status: DISCONTINUED | OUTPATIENT
Start: 2017-09-10 | End: 2017-09-10

## 2017-09-10 RX ORDER — SODIUM CHLORIDE 9 MG/ML
INJECTION, SOLUTION INTRAVENOUS CONTINUOUS
Status: DISCONTINUED | OUTPATIENT
Start: 2017-09-10 | End: 2017-09-10

## 2017-09-10 RX ORDER — BISACODYL 10 MG
10 SUPPOSITORY, RECTAL RECTAL
Status: DISCONTINUED | OUTPATIENT
Start: 2017-09-10 | End: 2017-09-11

## 2017-09-10 RX ORDER — ONDANSETRON 2 MG/ML
4 INJECTION INTRAMUSCULAR; INTRAVENOUS EVERY 6 HOURS PRN
Status: DISCONTINUED | OUTPATIENT
Start: 2017-09-10 | End: 2017-09-15

## 2017-09-10 RX ORDER — WARFARIN SODIUM 5 MG/1
5 TABLET ORAL
Status: CANCELLED | OUTPATIENT
Start: 2017-09-11

## 2017-09-10 RX ORDER — LORAZEPAM 2 MG/ML
2 INJECTION INTRAMUSCULAR ONCE
Status: COMPLETED | OUTPATIENT
Start: 2017-09-10 | End: 2017-09-10

## 2017-09-10 RX ORDER — ACETAMINOPHEN 325 MG/1
650 TABLET ORAL EVERY 6 HOURS PRN
Status: DISCONTINUED | OUTPATIENT
Start: 2017-09-10 | End: 2017-09-15

## 2017-09-10 RX ORDER — FOLIC ACID 1 MG/1
1 TABLET ORAL DAILY
Status: DISCONTINUED | OUTPATIENT
Start: 2017-09-10 | End: 2017-09-15

## 2017-09-10 RX ORDER — POLYETHYLENE GLYCOL 3350 17 G/17G
17 POWDER, FOR SOLUTION ORAL DAILY PRN
Status: DISCONTINUED | OUTPATIENT
Start: 2017-09-10 | End: 2017-09-15

## 2017-09-10 RX ORDER — SODIUM CHLORIDE 9 MG/ML
INJECTION, SOLUTION INTRAVENOUS CONTINUOUS
Status: ACTIVE | OUTPATIENT
Start: 2017-09-10 | End: 2017-09-10

## 2017-09-10 RX ORDER — ATENOLOL 25 MG/1
25 TABLET ORAL
Status: DISCONTINUED | OUTPATIENT
Start: 2017-09-10 | End: 2017-09-15

## 2017-09-10 RX ORDER — ONDANSETRON 2 MG/ML
4 INJECTION INTRAMUSCULAR; INTRAVENOUS EVERY 4 HOURS PRN
Status: DISCONTINUED | OUTPATIENT
Start: 2017-09-10 | End: 2017-09-10

## 2017-09-10 RX ORDER — PANTOPRAZOLE SODIUM 40 MG/1
40 TABLET, DELAYED RELEASE ORAL
Status: DISCONTINUED | OUTPATIENT
Start: 2017-09-10 | End: 2017-09-15

## 2017-09-10 RX ORDER — ATENOLOL AND CHLORTHALIDONE 50; 25 MG/1; MG/1
0.5 TABLET ORAL DAILY
Status: DISCONTINUED | OUTPATIENT
Start: 2017-09-10 | End: 2017-09-10

## 2017-09-10 RX ORDER — TRAZODONE HYDROCHLORIDE 50 MG/1
50 TABLET ORAL NIGHTLY PRN
Status: DISCONTINUED | OUTPATIENT
Start: 2017-09-10 | End: 2017-09-15

## 2017-09-10 RX ORDER — DULOXETIN HYDROCHLORIDE 30 MG/1
60 CAPSULE, DELAYED RELEASE ORAL DAILY
Status: DISCONTINUED | OUTPATIENT
Start: 2017-09-10 | End: 2017-09-15

## 2017-09-10 RX ORDER — LORAZEPAM 2 MG/ML
INJECTION INTRAMUSCULAR
Status: DISPENSED
Start: 2017-09-10 | End: 2017-09-10

## 2017-09-10 RX ORDER — LEVOFLOXACIN 5 MG/ML
750 INJECTION, SOLUTION INTRAVENOUS ONCE
Status: COMPLETED | OUTPATIENT
Start: 2017-09-10 | End: 2017-09-10

## 2017-09-10 RX ORDER — DOCUSATE SODIUM 100 MG/1
100 CAPSULE, LIQUID FILLED ORAL 2 TIMES DAILY
Status: DISCONTINUED | OUTPATIENT
Start: 2017-09-10 | End: 2017-09-15

## 2017-09-10 RX ORDER — ACETAMINOPHEN, ASPIRIN AND CAFFEINE 250; 250; 65 MG/1; MG/1; MG/1
1 TABLET, FILM COATED ORAL EVERY 4 HOURS PRN
Status: DISCONTINUED | OUTPATIENT
Start: 2017-09-10 | End: 2017-09-15

## 2017-09-10 RX ORDER — LORAZEPAM 1 MG/1
1 TABLET ORAL EVERY 6 HOURS PRN
Status: DISCONTINUED | OUTPATIENT
Start: 2017-09-10 | End: 2017-09-15

## 2017-09-10 RX ORDER — CHLORTHALIDONE 50 MG/1
12.5 TABLET ORAL DAILY
Status: DISCONTINUED | OUTPATIENT
Start: 2017-09-10 | End: 2017-09-15

## 2017-09-10 NOTE — H&P
[facility]  History and Physical     Arcadio Gomez Patient Status:  Emergency    1928 MRN [id number]   Location [address] [facility] Attending Gelacio Gustafson MD   Hosp Day # 0 PCP Hermelindo Coyne MD     Chief Complaint: Swapnil Leal (without mention of hemorrhage)    • Essential hypertension    • Fitting and adjustment of cardiac pacemaker    • HIGH BLOOD PRESSURE    • Hypertension    • Lung cancer ([facility] [address]    • Lung nodule     CT scan of chest from 8/2013 - Right lung upper lobe mass me use drugs.     Family History:   Family History   Problem Relation Age of Onset   • Thyroid Disorder Daughter      Thyroid cancer - thyroidectomy but no MILES   • Cancer Daughter      Thyroid cancer - tx with thyroidectomy but no MILES   • Thyroid Disorder Othe before breakfast. Disp: 14 tablet Rfl: 0   Mometasone Furoate 50 MCG/ACT Nasal Suspension 1 spray by Nasal route daily. Disp: 1 Bottle Rfl: 1   folic acid 1 MG Oral Tab Take 1 mg by mouth daily.  Disp:  Rfl:    Budesonide-Formoterol Fumarate (SYMBICORT) 160 09/09/17   2230   GLU  129*   BUN  26*   CREATSERUM  1.27*   CA  9.2   ALB  2.6*   NA  138   K  4.0   CL  100*   CO2  32.0   ALKPHO  78   AST  13*   ALT  15   BILT  0.3   TP  6.2       No results for input(s): PTP, INR in the last 72 hours.     Recent Labs

## 2017-09-10 NOTE — PROGRESS NOTES
Hospitalist Progress Note    Patient seen and examined and agree with plan outlined by Dr. Maloney Melena earlier this morning. Doubt infectious colitis given lack of diarrhea/fever/leukocytosis. Inflammatory unlikely given age. F/u Gi recommendations.     [place]

## 2017-09-10 NOTE — CONSULTS
[facility]                       Gastroenterology [address] [facility] Gastroenterology    Chloe Brito Patient Status:  Inpatient    1928 MRN [id number]   [facility] 5NW-A Attending Sonja Dixon,    Hosp Da multinodular goiter     Dx in 6/2013 - thyroid U/S showed right upper nodule of 1.1 cm, right midpole nodule of 3.2 cm and left inferior nodule of 1.6 cm with microcalcifications .  Biopsy of all three benign   • Osteoarthrosis, unspecified whether generali (DEX4) oral liquid 30 g 30 g Oral Q15 Min PRN   Or      Glucose-Vitamin C (DEX-4) 4-0.006 g chewable tab 8 tablet 8 tablet Oral Q15 Min PRN   acetaminophen (TYLENOL) tab 650 mg 650 mg Oral Q6H PRN   docusate sodium (COLACE) cap 100 mg 100 mg Oral BID   PEG Rash    Comment:Occurs sometimes  Darvon [Bexophene]      Rash  Iv Dye [Radiology C*        Comment:Unsure reaction  Msg [Monosodium Glu*    Other (See Comments)    Comment:Watering eyes and \"acts out\"  Naproxen                Diarrhea  Percocet [Oxycodo EOMI, PERRL, oropharynx is clear with moist mucosal membranes  Eyes: Sclerae are anicteric  Neck:  Supple without nuchal rigidity;  No lymphadenopathy  CV: Regular rate and rhythm, with normal S1 and S2; No S3; No murmurs  Resp: Clear to auscultation bilate calcification. ADRENALS:  Right adrenal gland nodule measures 11 x 13 mm that measures 5 Hounsfield units consistent with adenoma. The left adrenal gland is normal.  AORTA/VASCULAR:  Ectasia of the abdominal aorta up to 28 mm.  Mild atherosclerotic plaqu malignancy, polyps, biliaryp atholgoy, possibly functional. CT findings of mild colon wall thickening-no symptoms of diarrhea  2. SOB with elevated D-dimer- to go for VQ scan today      Recommendations:   1.  Will recommend endoscopic evaluation once INR <2

## 2017-09-10 NOTE — PROGRESS NOTES
Dr. Ron Portillo notified VQ Scan can not be done on weekends without order being STAT and on call staff to be called in. Per MD orders, leave VQ scan ordered as routine and can be done tomorrow. Will continue to monitor.

## 2017-09-10 NOTE — ED PROVIDER NOTES
Patient Seen in: [facility] Emergency Department    History   Patient presents with:  Nausea/Vomiting/Diarrhea (gastrointestinal)    Stated Complaint: nausea    HPI    80-year-old female with a history of atrial fibrillation, history of breast cance nodule of 1.6 cm with microcalcifications .  Biopsy of all three benign   • Osteoarthrosis, unspecified whether generalized or localized, unspecified site    • Pacemaker     Pacemaker placed in 2004 at [place]   • PONV (postoperative nausea and vomiting) other systems reviewed and negative except as noted above. PSFH elements reviewed from today and agreed except as otherwise stated in HPI.     Physical Exam   ED Triage Vitals  BP: 112/72 [09/09/17 2211]  Pulse: 72 [09/09/17 2211]  Resp: 18 [09/09/17 221 Cells Large (*)     Mucous Urine 1+ (*)     All other components within normal limits   TSH W REFLEX TO FREE T4 - Abnormal; Notable for the following:     TSH <0.005 (*)     All other components within normal limits   PROTHROMBIN TIME (PT) - Abnormal; Nota Abnormal; Notable for the following:     POC Glucose 106 (*)     All other components within normal limits   POCT GLUCOSE - Abnormal; Notable for the following:     POC Glucose 101 (*)     All other components within normal limits   POCT GLUCOSE - Abnormal following:     RBC 3.63 (*)     HGB 10.0 (*)     HCT 31.1 (*)     Lymphocyte Absolute 0.86 (*)     All other components within normal limits   CBC W/ DIFFERENTIAL - Abnormal; Notable for the following:     RBC 3.13 (*)     HGB 8.8 (*)     HCT 26.6 (*) potent immunogen, most \"D\" negative individuals who are exposed to foreign \"D\" positive cells (through pregnancy or transfusion) will respond by producing anti-D reactive at 37 degrees C (antiglobulin phase).  Rare examples of IgM anti-D have been repor tests on the individual orders.    PREPARE FRESH FROZEN PLASMA   ABORH (BLOOD TYPE)   ANTIBODY SCREEN   PREPARE FRESH FROZEN PLASMA   PREPARE FRESH FROZEN PLASMA   RAINBOW DRAW BLUE   RAINBOW DRAW LAVENDER   RAINBOW DRAW LIGHT GREEN   SPUTUM CULTURE   Rita Vega purposes of treating  [dehydration and nausea]. The patient had good response to these medications. Patient continued to be observed here in the emergency department. Patient remained stable throughout the emergency department observation period.     E J15.9 Unknown Unknown    Failure to thrive (0-17) R62.51 9/10/2017     Pacemaker Z95.0 Unknown     Overview Addendum 9/11/2013  5:30 PM by Tana Ospina MD     Pacemaker placed in 2004 at [facility]         Type 2 diabetes mellitus without complication, wi

## 2017-09-10 NOTE — PROGRESS NOTES
09/10/17 1300   Clinical Encounter Type   Visited With Patient and family together   Routine Visit Introduction   Continue Visiting No   Patient's Supportive Strategies/Resources Patient states and presents spiritual strength   Lutheran Encounters   Sp

## 2017-09-10 NOTE — DIETARY MALNUTRITION NOTE
[facility]    NUTRITION INITIAL ASSESSMENT     Pt meets moderate malnutrition criteria.     NUTRITION DIAGNOSIS/PROBLEM:    Malnutrition related to decreased intake as evidenced by 27# pound wt loss over the last few months per daughter, this is a sign kg (125 lb)  08/16/17 : 68 kg (149 lb 14.6 oz)  07/31/17 : 68 kg (150 lb)  01/17/17 : 68 kg (150 lb)  01/10/17 : 68 kg (150 lb)      NUTRITION:  Diet: npo  Oral Supplements: n/a    FOOD/NUTRITION RELATED HISTORY:  Appetite: Good  Intake: 75%  Intake Meetin

## 2017-09-10 NOTE — ED NOTES
Report received from [facility] pt sitting uprt on cart with family present. Requested urine from pt, pt unable to provide spec at this time. Iv continues to infuse well lt ac. Pt instructed to hit call light when she is available to provide urine.

## 2017-09-10 NOTE — PLAN OF CARE
Received patient from ER a/ox4. She has multiple complaints including that her ankles hurt, she has a headache and she is nauseated. She was given tylenol for headache but still reports pain. She was given zofran and reglan but still reports nausea.  She is

## 2017-09-11 ENCOUNTER — APPOINTMENT (OUTPATIENT)
Dept: NUCLEAR MEDICINE | Facility: HOSPITAL | Age: 82
DRG: 391 | End: 2017-09-11
Attending: HOSPITALIST
Payer: MEDICARE

## 2017-09-11 ENCOUNTER — APPOINTMENT (OUTPATIENT)
Dept: ULTRASOUND IMAGING | Facility: HOSPITAL | Age: 82
DRG: 391 | End: 2017-09-11
Attending: INTERNAL MEDICINE
Payer: MEDICARE

## 2017-09-11 PROBLEM — J44.9 CHRONIC OBSTRUCTIVE PULMONARY DISEASE (HCC): Status: ACTIVE | Noted: 2017-09-10

## 2017-09-11 LAB
ANTIBODY SCREEN: POSITIVE
GLUCOSE BLD-MCNC: 101 MG/DL (ref 65–99)
GLUCOSE BLD-MCNC: 86 MG/DL (ref 65–99)
GLUCOSE BLD-MCNC: 97 MG/DL (ref 65–99)
GLUCOSE BLD-MCNC: 99 MG/DL (ref 65–99)
INR BLD: 2.16 (ref 0.89–1.11)
INR BLD: 5.21 (ref 0.89–1.11)
PROCALCITONIN SERPL-MCNC: 24.53 NG/ML (ref ?–0.11)
PSA SERPL DL<=0.01 NG/ML-MCNC: 24.4 SECONDS (ref 12–14.3)
PSA SERPL DL<=0.01 NG/ML-MCNC: 49.4 SECONDS (ref 12–14.3)
RH BLOOD TYPE: NEGATIVE

## 2017-09-11 PROCEDURE — 30233K1 TRANSFUSION OF NONAUTOLOGOUS FROZEN PLASMA INTO PERIPHERAL VEIN, PERCUTANEOUS APPROACH: ICD-10-PCS | Performed by: HOSPITALIST

## 2017-09-11 PROCEDURE — 76700 US EXAM ABDOM COMPLETE: CPT | Performed by: INTERNAL MEDICINE

## 2017-09-11 PROCEDURE — 78582 LUNG VENTILAT&PERFUS IMAGING: CPT | Performed by: HOSPITALIST

## 2017-09-11 PROCEDURE — 99232 SBSQ HOSP IP/OBS MODERATE 35: CPT | Performed by: HOSPITALIST

## 2017-09-11 RX ORDER — HYDROCODONE BITARTRATE AND ACETAMINOPHEN 5; 325 MG/1; MG/1
1 TABLET ORAL EVERY 8 HOURS PRN
Status: DISCONTINUED | OUTPATIENT
Start: 2017-09-11 | End: 2017-09-15

## 2017-09-11 RX ORDER — SODIUM CHLORIDE 9 MG/ML
INJECTION, SOLUTION INTRAVENOUS ONCE
Status: COMPLETED | OUTPATIENT
Start: 2017-09-11 | End: 2017-09-12

## 2017-09-11 RX ORDER — SODIUM CHLORIDE 9 MG/ML
INJECTION, SOLUTION INTRAVENOUS ONCE
Status: COMPLETED | OUTPATIENT
Start: 2017-09-11 | End: 2017-09-11

## 2017-09-11 RX ORDER — DIPHENHYDRAMINE HYDROCHLORIDE 50 MG/ML
25 INJECTION INTRAMUSCULAR; INTRAVENOUS ONCE
Status: COMPLETED | OUTPATIENT
Start: 2017-09-11 | End: 2017-09-11

## 2017-09-11 RX ORDER — ACETAMINOPHEN 325 MG/1
650 TABLET ORAL ONCE
Status: COMPLETED | OUTPATIENT
Start: 2017-09-11 | End: 2017-09-11

## 2017-09-11 RX ORDER — METOCLOPRAMIDE HYDROCHLORIDE 5 MG/ML
5 INJECTION INTRAMUSCULAR; INTRAVENOUS EVERY 6 HOURS PRN
Status: DISCONTINUED | OUTPATIENT
Start: 2017-09-11 | End: 2017-09-15

## 2017-09-11 RX ORDER — BISACODYL 10 MG
10 SUPPOSITORY, RECTAL RECTAL
Status: DISCONTINUED | OUTPATIENT
Start: 2017-09-11 | End: 2017-09-15

## 2017-09-11 NOTE — PHYSICAL THERAPY NOTE
PT order received, chart reviewed. INR 5.21 this AM. Pt receiving fresh frozen plasma at this time. Waiting for INR <5 before seeing pt. VQ ordered to assess for likelihood of PE.  Will hold therapy until rule out PE.

## 2017-09-11 NOTE — CM/SW NOTE
Admitted for Colitis. CM spoke to daughter who is POA re d/c planning. Daughter states patient is independent at home and uses a cane to ambulate. States she dresses herself, does not cook anymore. Has no hx of [facility] or Home Health.  Daughter states patient

## 2017-09-11 NOTE — PROGRESS NOTES
Pharmacy Note: Renal dose adjustment for Metaclopramide (Reglan)  Caty Garcia has been prescribed Metoclopramide (Reglan) 10 mg every 6 hours as needed. Estimated Creatinine Clearance: 30.5 mL/min (based on SCr of 1.1 mg/dL).     Her calculated crea

## 2017-09-11 NOTE — PLAN OF CARE
GASTROINTESTINAL - ADULT    • Minimal or absence of nausea and vomiting Progressing    • Maintains or returns to baseline bowel function Progressing        Patient/Family Goals    • Patient/Family Short Term Goal Progressing        SAFETY ADULT - FALL    • ([facility] [address]     Compression fracture of thoracic vertebra (HCC)     Hip pain     Adenoma of right adrenal gland     Acute bronchitis     Benign essential HTN     Diabetes mellitus type 2 in nonobese (HCC)     At risk for falling     CKD (chronic kidney disease)

## 2017-09-11 NOTE — PROGRESS NOTES
[facility]  Progress Note     Marlene Torres Patient Status:  Inpatient    1928 MRN [id number]   [facility] 5NW-A Attending Christina Carter, [address] Day # 1 PCP Atif Muñoz MD     Chief Complaint: Abdominal pain    S: Patie Intravenous Once   • docusate sodium  100 mg Oral BID   • Insulin Aspart Pen  1-5 Units Subcutaneous TID CC and HS   • Fluticasone Furoate-Vilanterol  1 puff Inhalation Daily   • Umeclidinium Bromide  1 puff Inhalation Daily   • DULoxetine HCl  60 mg Oral

## 2017-09-11 NOTE — PROGRESS NOTES
09/10/17 9013   Provider Notification   Reason for Communication Patient request  (sleep aid)   Provider Name Kalia Moy MD   Method of Communication Page   Response Waiting for response   Notification Time [phone]   no call back, see orders

## 2017-09-12 ENCOUNTER — APPOINTMENT (OUTPATIENT)
Dept: GENERAL RADIOLOGY | Facility: HOSPITAL | Age: 82
DRG: 391 | End: 2017-09-12
Attending: HOSPITALIST
Payer: MEDICARE

## 2017-09-12 ENCOUNTER — SURGERY (OUTPATIENT)
Age: 82
End: 2017-09-12

## 2017-09-12 ENCOUNTER — APPOINTMENT (OUTPATIENT)
Dept: CT IMAGING | Facility: HOSPITAL | Age: 82
DRG: 391 | End: 2017-09-12
Attending: HOSPITALIST
Payer: MEDICARE

## 2017-09-12 ENCOUNTER — ANESTHESIA EVENT (OUTPATIENT)
Dept: ENDOSCOPY | Facility: HOSPITAL | Age: 82
DRG: 391 | End: 2017-09-12
Payer: MEDICARE

## 2017-09-12 ENCOUNTER — ANESTHESIA (OUTPATIENT)
Dept: ENDOSCOPY | Facility: HOSPITAL | Age: 82
DRG: 391 | End: 2017-09-12
Payer: MEDICARE

## 2017-09-12 ENCOUNTER — APPOINTMENT (OUTPATIENT)
Dept: ULTRASOUND IMAGING | Facility: HOSPITAL | Age: 82
DRG: 391 | End: 2017-09-12
Attending: STUDENT IN AN ORGANIZED HEALTH CARE EDUCATION/TRAINING PROGRAM
Payer: MEDICARE

## 2017-09-12 LAB
BLOOD TYPE BARCODE: 5100
GLUCOSE BLD-MCNC: 102 MG/DL (ref 65–99)
GLUCOSE BLD-MCNC: 107 MG/DL (ref 65–99)
GLUCOSE BLD-MCNC: 186 MG/DL (ref 65–99)
GLUCOSE BLD-MCNC: 99 MG/DL (ref 65–99)
INR BLD: 1.93 (ref 0.89–1.11)
PSA SERPL DL<=0.01 NG/ML-MCNC: 22.3 SECONDS (ref 12–14.3)

## 2017-09-12 PROCEDURE — 93975 VASCULAR STUDY: CPT | Performed by: STUDENT IN AN ORGANIZED HEALTH CARE EDUCATION/TRAINING PROGRAM

## 2017-09-12 PROCEDURE — 71020 XR CHEST PA + LAT CHEST (CPT=71020): CPT | Performed by: HOSPITALIST

## 2017-09-12 PROCEDURE — 76700 US EXAM ABDOM COMPLETE: CPT | Performed by: STUDENT IN AN ORGANIZED HEALTH CARE EDUCATION/TRAINING PROGRAM

## 2017-09-12 PROCEDURE — 0DJ08ZZ INSPECTION OF UPPER INTESTINAL TRACT, VIA NATURAL OR ARTIFICIAL OPENING ENDOSCOPIC: ICD-10-PCS | Performed by: STUDENT IN AN ORGANIZED HEALTH CARE EDUCATION/TRAINING PROGRAM

## 2017-09-12 PROCEDURE — 99232 SBSQ HOSP IP/OBS MODERATE 35: CPT | Performed by: HOSPITALIST

## 2017-09-12 PROCEDURE — 71250 CT THORAX DX C-: CPT | Performed by: HOSPITALIST

## 2017-09-12 RX ORDER — SODIUM CHLORIDE 9 MG/ML
INJECTION, SOLUTION INTRAVENOUS ONCE
Status: DISCONTINUED | OUTPATIENT
Start: 2017-09-12 | End: 2017-09-15

## 2017-09-12 RX ORDER — SODIUM CHLORIDE, SODIUM LACTATE, POTASSIUM CHLORIDE, CALCIUM CHLORIDE 600; 310; 30; 20 MG/100ML; MG/100ML; MG/100ML; MG/100ML
INJECTION, SOLUTION INTRAVENOUS CONTINUOUS
Status: DISCONTINUED | OUTPATIENT
Start: 2017-09-12 | End: 2017-09-15

## 2017-09-12 RX ORDER — NALOXONE HYDROCHLORIDE 0.4 MG/ML
80 INJECTION, SOLUTION INTRAMUSCULAR; INTRAVENOUS; SUBCUTANEOUS AS NEEDED
Status: ACTIVE | OUTPATIENT
Start: 2017-09-12 | End: 2017-09-13

## 2017-09-12 RX ORDER — ALBUTEROL SULFATE 2.5 MG/3ML
2.5 SOLUTION RESPIRATORY (INHALATION)
Status: DISCONTINUED | OUTPATIENT
Start: 2017-09-12 | End: 2017-09-15

## 2017-09-12 RX ORDER — DEXTROSE MONOHYDRATE 25 G/50ML
50 INJECTION, SOLUTION INTRAVENOUS
Status: DISCONTINUED | OUTPATIENT
Start: 2017-09-12 | End: 2017-09-15

## 2017-09-12 NOTE — BRIEF OP NOTE
Pre-Operative Diagnosis: GI (gastrointestinal bleed) [K92.2]     Post-Operative Diagnosis: Normal EGD     Procedure Performed:   Procedure(s):EGD      Surgeon(s) and Role:     * Kimmie Sharma MD - Primary    Assistant(s):        Surgical Findings:

## 2017-09-12 NOTE — PROGRESS NOTES
Gastroenterology Progress Note  Patient Name: Iman Colon  Chief Complaint: weight loss, nausae  S: Did not tolerate prep overnight.     O: /57 (BP Location: Left arm)   Pulse 60   Temp 97.6 °F (36.4 °C) (Oral)   Resp 18   Wt 122 lb 12.8 oz (55.7

## 2017-09-12 NOTE — PROGRESS NOTES
[facility]  Progress Note     Luna Heath Patient Status:  Inpatient    1928 MRN [id number]   [facility] 5NW-A Attending Alejandra Austin, [address] [facility] Day # 2 PCP Essence Cardoza MD     Chief Complaint: Abdominal pain    S: Patie Lab  09/09/17   2230   TROP  <0.046            Imaging: Imaging data reviewed in Epic.     Medications:   • sodium chloride   Intravenous Once   • cefTRIAXone  1 g Intravenous Q24H   • doxycycline  100 mg Intravenous Q12H   • polyethylene glycol  2,000 mL

## 2017-09-12 NOTE — PROGRESS NOTES
09/11/17 2245   Provider Notification   Reason for Communication Review case  (pt refusing to complete prep)   Provider Name Other (comment)  Cally Ledbetter)   Method of Communication Page   Response Waiting for response   Notification Time [phone]   MD call

## 2017-09-12 NOTE — CONSULTS
[facility]  Report of Consultation    Marypricilla Rivasnathalie Patient Status:  Inpatient    1928 MRN [id number]   [facility] 5NW-A Attending Olive Owens, [address] [facility] Day # 2 PCP Meta Opitz, MD     Reason for Consultation:   abn ct multinodular goiter     Dx in 6/2013 - thyroid U/S showed right upper nodule of 1.1 cm, right midpole nodule of 3.2 cm and left inferior nodule of 1.6 cm with microcalcifications .  Biopsy of all three benign   • Osteoarthrosis, unspecified whether generali Rash    Comment:Occurs sometimes  Darvon [Bexophene]      Rash  Iv Dye [Radiology C*        Comment:Unsure reaction  Msg [Monosodium Glu*    Other (See Comments)    Comment:Watering eyes and \"acts out\"  Naproxen                Diarrhea  Percocet [Oxycodo Furoate-Vilanterol (BREO ELLIPTA) 200-25 MCG/INH inhaler 1 puff, 1 puff, Inhalation, Daily  •  Umeclidinium Bromide (INCRUSE ELLIPTA) 62.5 MCG/INH inhaler 1 puff, 1 puff, Inhalation, Daily  •  DULoxetine HCl (CYMBALTA) DR particles cap 60 mg, 60 mg, Oral, %   09/12/17 1145 144/58 - - 66 14 99 %   09/12/17 1140 134/51 - - 60 14 97 %   09/12/17 1135 134/51 - - 59 14 96 %   09/12/17 1111 - 98.6 °F (37 °C) Oral - - -   09/12/17 0900 148/57 97.6 °F (36.4 °C) Oral 60 18 -   09/12/17 0651 126/49 98.1 °F (36.7 °C) resusitation    3. Colitis, ? chronic - as per GI, s/p  EGD - negative , Unable to tolerate bowel prep  4. Hypoxemia, worsening - due to above , VQ in admission is negative  5. Coagulopathy , on coumadin  6.  Adenocarcinoma of the right lung, T1N0M0, stage

## 2017-09-12 NOTE — PLAN OF CARE
AxO x4, forgetful, anxious at times. On 2.5L, baseline level, lungs diminished bilaterally. Nebs scheduled, need sputum but cough non-productive. Had coughing with drinking, speech consulted, new pulm consulted today.  No complaints of nausea today, was ups (chronic kidney disease) stage 3, GFR 30-59 ml/min     History of left breast cancer     Chronic atrial fibrillation (HCC)     Malignant neoplasm of left lung (HCC)     Anxiety     COPD exacerbation (HCC)     Chronic respiratory insufficiency     Type 2 di

## 2017-09-12 NOTE — ANESTHESIA PREPROCEDURE EVALUATION
PRE-OP EVALUATION    Patient Name: Boone Donaldson    Pre-op Diagnosis: GI (gastrointestinal bleed) [K92.2]    Procedure(s):      Surgeon(s) and Role:     * Lydia Sharma MD - Primary    Pre-op vitals reviewed.   Temp: 98.6 °F (37 °C)  Pulse: 60  Res Oral Q15 Min PRN   acetaminophen (TYLENOL) tab 650 mg 650 mg Oral Q6H PRN   docusate sodium (COLACE) cap 100 mg 100 mg Oral BID   PEG 3350 (MIRALAX) powder packet 17 g 17 g Oral Daily PRN   FLEET ENEMA (FLEET) 7-19 GM/118ML enema 133 mL 1 enema Rectal Once [Tramadol-Acetaminophen]      Anesthesia Evaluation    Patient summary reviewed. Anesthetic Complications           GI/Hepatic/Renal             (+) chronic renal disease                    Cardiovascular      ECG reviewed.             (+) hypertension opening: >3 FB  TM distance: > 6 cm  Neck ROM: limited Cardiovascular      Rhythm: regular       Dental             Pulmonary          (+) rhonchi  (+) decreased breath sounds      (+) coarse breath sounds   Other findings            ASA: 4   Plan: MAC  NP

## 2017-09-12 NOTE — PROGRESS NOTES
09/11/17 1949   Provider Notification   Reason for Communication Other (comment)  (prep orders?)   Provider Name Other (comment)  Zac Wilson   Method of Communication Page   Response Waiting for response   Notification Time 1950   MD called back, see

## 2017-09-12 NOTE — PROGRESS NOTES
09/11/17 8422   Provider Notification   Reason for Communication Other (comment)  (update, urine culture +ecoli)   Provider Name Carlos Cao MD   Method of Communication Page   Response Waiting for response   Notification Time [phone]   No call back

## 2017-09-12 NOTE — PROGRESS NOTES
09/12/17 0432   Provider Notification   Reason for Communication Review case   Provider Name Other (comment)  Regan Cloud)   Method of Communication Call   Response Phone call   Notification Time [phone]   MD notified that pt had about half cup of golytel

## 2017-09-12 NOTE — ANESTHESIA POSTPROCEDURE EVALUATION
C/Rubén Church Patient Status:  Inpatient   Age/Gender 80year old female MRN [id number]   Location [facility]. Attending Ra Cuevas, [address] [facility] Day # 2 PCP Carroll Gorman MD       Anesthesia Post-op Note    Procedure(s)

## 2017-09-12 NOTE — BH PROGRESS NOTE
[facility] Resource Referral Counselor Note    Tashia Munoz Patient Status:  Inpatient    1928 MRN [id number]   [facility] 5NW-A Attending Monserrat Hernandez, [address] [facility] Day # 2 PCP Que Mccarthy MD       S(subjective) Patient

## 2017-09-12 NOTE — PROGRESS NOTES
Updated pt and pt's dtr with POC (Insert dobhoff to continue golytely prep, if pt still refusing, ok to attempt again at 0400 to complete golytely prep, if pt still unable to complete prep, will continue with EGD and no colonoscopy 9/12)  Pt and dtr verbal

## 2017-09-13 LAB
BLOOD TYPE BARCODE: 5100
BLOOD TYPE BARCODE: 5100
GLUCOSE BLD-MCNC: 111 MG/DL (ref 65–99)
GLUCOSE BLD-MCNC: 116 MG/DL (ref 65–99)
GLUCOSE BLD-MCNC: 194 MG/DL (ref 65–99)
GLUCOSE BLD-MCNC: 267 MG/DL (ref 65–99)
INR BLD: 1.97 (ref 0.89–1.11)
PHOSPHATE SERPL-MCNC: 3.2 MG/DL (ref 2.5–4.9)
PSA SERPL DL<=0.01 NG/ML-MCNC: 22.7 SECONDS (ref 12–14.3)

## 2017-09-13 PROCEDURE — 99232 SBSQ HOSP IP/OBS MODERATE 35: CPT | Performed by: HOSPITALIST

## 2017-09-13 NOTE — SLP NOTE
ADULT SWALLOWING EVALUATION    ASSESSMENT & PLAN   ASSESSMENT  Order received for bedside swallow evaluation. Pt admitted with poor appetite x 3 monts; persistent nausea/vomiting and failure to thrive.   Rn and pt's daughter present at bedside report cough use of insulin (HCC)    Atrial fibrillation, unspecified type ([facility]    Community acquired bacterial pneumonia      Past Medical History  Past Medical History:   Diagnosis Date   • Arrhythmia     Atrial Fib   • Atrial fibrillation ([facility]    • Breast cancer ( Patient/Family Goals: To at and drink safely    SWALLOWING HISTORY  Current Diet Consistency: Regular; Thin liquids  Dysphagia History: daughter reports intermittent coughing meals at home  Imaging Results: CXR 9/12/17  Worsening consolidation left lowe

## 2017-09-13 NOTE — PROGRESS NOTES
[facility]  Progress Note     Hillary Spring Patient Status:  Inpatient    1928 MRN [id number]   [facility] 5NW-A Attending Nitza Mauro, [address] [facility] Day # 3 PCP Tyson Clifton MD     Chief Complaint: Abdominal pain    S: Patie and HS   • Fluticasone Furoate-Vilanterol  1 puff Inhalation Daily   • Umeclidinium Bromide  1 puff Inhalation Daily   • DULoxetine HCl  60 mg Oral Daily   • folic acid  1 mg Oral Daily   • Pantoprazole Sodium  40 mg Oral QAM AC   • atenolol  25 mg Oral Da

## 2017-09-13 NOTE — SLP NOTE
Order received for bedside swallow evaluation. Spoke with RN who reported that pt recently given Ativan and now too lethargic for safe po trials. Will re-attempt as available and appropriate.     Leah Chris MA, [address] [facility]  Speech Language Pathologist

## 2017-09-13 NOTE — PHYSICAL THERAPY NOTE
Attempted to see pt this Am. Sleeping upon arrival. Nursing staff requesting to let pt sleep, had a bad night on pain meds now. Will follow up 9/14.

## 2017-09-13 NOTE — PROGRESS NOTES
[facility] Pulmonary/Critical Care Progress Note     SUBJECTIVE/24H Events: All events, procedures, notes reviewed. Patient and daughter decline acute concerns today and hoping to go home soon.  She denies cough, no [place] hours. No results for input(s): ANTHONY CK in the last 72 hours.   Recent Labs   Lab  09/11/17   1431  09/12/17   0852  09/13/17   0625   INR  2.16*  1.93*  1.97*     No results for input(s): ABGPHT, KJCJPI9M, ZUFPC5F, ABGHCO3, ABGBE, TEMP, STANLEY, SITE, DEV, Us Abdominal Doppler (hxs=[phone])+(edw 62336)    Result Date: 9/12/2017  CONCLUSION:  1.  Allowing for the limitations of the exam, the Doppler evaluation of the celiac axis and SMA demonstrate no evidence for elevated velocities or findings worris aspirin-acetaminophen-caffeine, LORazepam, TraZODone HCl    ASSESSMENT   1. Worsening consolidation CT 9/12/17 as compared to 8/2017 and CT abdomen from 9/10/17 which is most concerning for aspiration pneumonia/pneumonitis  2.  Minimal right pleural effusio

## 2017-09-13 NOTE — PROGRESS NOTES
Gastroenterology Progress Note  Lovely Bates Patient Status:  Inpatient    1928 MRN [id number]   [facility] 5NW-A Attending Ja Case, [address] [facility] Day # 3 PCP Yobany Price MD     C Gastroenterology  [phone]

## 2017-09-13 NOTE — PLAN OF CARE
Drowsy this AM and sleeping, did not sleep last [address] [place]. Oriented x4, forgetful, anxious and confused at times. 2.5 L, baseline level, lungs diminished bilaterally. Nebs scheduled. Non-productive. PT and SLP to eval today. SCDs.  C/o nausea, meds given prn, low Malignant neoplasm of left lung (HCC)     Anxiety     COPD exacerbation (HCC)     Chronic respiratory insufficiency     Type 2 diabetes mellitus with complication, without long-term current use of insulin (HCC)     Malignant neoplasm of female breast ([address]

## 2017-09-14 ENCOUNTER — APPOINTMENT (OUTPATIENT)
Dept: GENERAL RADIOLOGY | Facility: HOSPITAL | Age: 82
DRG: 391 | End: 2017-09-14
Attending: HOSPITALIST
Payer: MEDICARE

## 2017-09-14 LAB
GLUCOSE BLD-MCNC: 113 MG/DL (ref 65–99)
GLUCOSE BLD-MCNC: 152 MG/DL (ref 65–99)
GLUCOSE BLD-MCNC: 173 MG/DL (ref 65–99)
GLUCOSE BLD-MCNC: 193 MG/DL (ref 65–99)
INR BLD: 1.92 (ref 0.89–1.11)
PSA SERPL DL<=0.01 NG/ML-MCNC: 22.2 SECONDS (ref 12–14.3)

## 2017-09-14 PROCEDURE — 74230 X-RAY XM SWLNG FUNCJ C+: CPT | Performed by: HOSPITALIST

## 2017-09-14 PROCEDURE — 99232 SBSQ HOSP IP/OBS MODERATE 35: CPT | Performed by: HOSPITALIST

## 2017-09-14 NOTE — PLAN OF CARE
AxO x3, forgetful, confused at times. Room air at rest, 2.5L sleeping, baseline level. Lungs diminished bilaterally. Refuses SCDs. BM today. VOids. IVF IV abx. Prn nausea medicine for nausea, prn pain meds for chronic back pain. Up with SBA with walker.  VF COPD exacerbation (HCC)     Chronic respiratory insufficiency     Type 2 diabetes mellitus with complication, without long-term current use of insulin (HCC)     Malignant neoplasm of female breast ([address]     Malignant neoplasm of lung (HCC)     Hyponatremia

## 2017-09-14 NOTE — CM/SW NOTE
Pt seen by PT with recommendation for [facility]. Met with pt and her dtr to discuss DC planning. Reviewed PT recommendations and pt is agreeable to [facility]. She has no preference in [facility] provider and was agreeable with referral to [facility].   Dis

## 2017-09-14 NOTE — OPERATIVE REPORT
EGD operative report  Patient Name: Chloe Brito  Procedure: Esophagogastroduodenoscopy   Indication: weight loss, abdominal pain, nausea  Attending: Kristin Bourgeois M.D.   Consent:  The risks, benefits, and alternatives were discussed with the patient / tonight for colonoscopy on 9/13/17   - Offered NG tube placement to assist in prep, but she has declined   - US mesenteric duplex   - Re-assess after colonoscopy  Walker Denver, MD

## 2017-09-14 NOTE — PHYSICAL THERAPY NOTE
PHYSICAL THERAPY QUICK EVALUATION - INPATIENT    Room Number: 831/354-W  Evaluation Date: 9/14/2017  Presenting Problem: Colitis  Physician Order: PT Eval and Treat    Problem List  Principal Problem:    Colitis  Active Problems:    Pacemaker    Failure be an adenoma and  is stable on CT scans   • Subclinical hyperthyroidism     Dx in 6/2013 - due to multinodular goiter   • Type II or unspecified type diabetes mellitus without mention of complication, not stated as uncontrolled     Type 2 DM - dx at age 9 functional limits     Lower extremity strength is within functional limits     NEUROLOGICAL FINDINGS                      AM-PAC '6-Clicks' INPATIENT SHORT FORM - BASIC MOBILITY  How much difficulty does the patient currently have. ..  -   Turning over in b upright in chair with breakfast. RN aware. Patient End of Session: Up in chair;Needs met;Call light within reach; All patient questions and concerns addressed;RN aware of session/findings; Family present; Discussed recommendations with /social admission. GOALS  Patient was able to achieve the following goals . ..     Patient was able to transfer Slightly below previous functional level   Patient able to ambulate on level surfaces Slightly below previous functional level

## 2017-09-14 NOTE — PROGRESS NOTES
[facility]  Progress Note     [facility] Patient Status:  Inpatient    1928 MRN [id number]   [facility] 5NW-A Attending Lazarus Handy, [address] [facility] Day # 4 PCP Slime Harvey MD     Chief Complaint: Abdominal pain    S: Patie • Fluticasone Furoate-Vilanterol  1 puff Inhalation Daily   • Umeclidinium Bromide  1 puff Inhalation Daily   • DULoxetine HCl  60 mg Oral Daily   • folic acid  1 mg Oral Daily   • Pantoprazole Sodium  40 mg Oral QA AC   • atenolol  25 mg Oral Daily Bet

## 2017-09-14 NOTE — PLAN OF CARE
GASTROINTESTINAL - ADULT    • Minimal or absence of nausea and vomiting Progressing        Patient/Family Goals    • Patient/Family Short Term Goal Progressing        SAFETY ADULT - FALL    • Free from fall injury Progressing        Pt A/O x 2-3, confused/

## 2017-09-14 NOTE — PROGRESS NOTES
[facility]    Progress Note    Cece Nunn Patient Status:  Inpatient    1928 MRN [id number]   [facility] 5NW-A Attending Charla Catherine, [address] [facility] Day # 4 PCP Sharath Romeo MD     Subjective:  Cece Nunn is a(n 80 ye pulmonary artery suggesting pulmonary arterial hypertension. THORACIC AORTA:  Moderate vascular calcification with ectasia without discrete aneurysm. Maximum transverse dimension 3.5 cm.   MEDIASTINUM/EVELINE:  Mildly enlarged right upper paratracheal, right Glucose-Vitamin C (DEX-4) 4-0.006 g chewable tab 4 tablet 4 tablet Oral Q15 Min PRN   Or      dextrose 50% injection 50 mL 50 mL Intravenous Q15 Min PRN   Or      glucose (DEX4) oral liquid 30 g 30 g Oral Q15 Min PRN   Or      Glucose-Vitamin C (DEX-4) 4 mg 12.5 mg Oral Daily   LORazepam (ATIVAN) tab 1 mg 1 mg Oral Q6H PRN   TraZODone HCl (DESYREL) tab 50 mg 50 mg Oral Nightly PRN   sodium chloride 0.9% IV bolus 1,000 mL 1,000 mL Intravenous Once       Assessment and Plan:  Patient Active Problem List: (INR)     Closed fracture of distal end of right radius with routine healing, unspecified fracture morphology, subsequent encounter     Colitis     Nausea     Coagulopathy (HCC)     Failure to thrive (0-17)     Chronic obstructive pulmonary disease ([facility]

## 2017-09-14 NOTE — VIDEO SWALLOW STUDY NOTE
ADULT VIDEOFLUOROSCOPIC SWALLOWING STUDY    Admission Date: 9/9/2017  Evaluation Date: 09/14/17  Radiologist: Dr. Diane Mcdonald    Dear Dr. Maryland Scheuermann,  This letter is to inform you of Marisel García Videofluoroscopic Swallowing Study results and/or plan of treat nodule     CT scan of chest from 8/2013 - Right lung upper lobe mass measuring 1.2 cm   • Macular degeneration     Receiving injections   • Nontoxic multinodular goiter     Dx in 6/2013 - thyroid U/S showed right upper nodule of 1.1 cm, right midpole nodul function.     THIN LIQUIDS  Method of Presentation: Teaspoon;Cup  Triggered at: Aryepiglottic folds  Delay (seconds): 1 second  Residue Severity, Location: Mild;Valleculae;Pyriform sinuses  Laryngeal Penetration: Before the swallow (via spoon only; none via over to pyriform sinus prior to trigger of pharyngeal response. Deep laryngeal penetration of thin liquids via spoon however eliminated during small, single sips from cup but still at risk of aspiration.   No laryngeal penetration/aspiration observed with

## 2017-09-15 ENCOUNTER — APPOINTMENT (OUTPATIENT)
Dept: GENERAL RADIOLOGY | Facility: HOSPITAL | Age: 82
DRG: 391 | End: 2017-09-15
Attending: INTERNAL MEDICINE
Payer: MEDICARE

## 2017-09-15 VITALS
SYSTOLIC BLOOD PRESSURE: 144 MMHG | BODY MASS INDEX: 20 KG/M2 | TEMPERATURE: 99 F | DIASTOLIC BLOOD PRESSURE: 78 MMHG | HEART RATE: 84 BPM | WEIGHT: 122.81 LBS | OXYGEN SATURATION: 97 % | RESPIRATION RATE: 16 BRPM

## 2017-09-15 LAB
ATRIAL RATE: 73 BPM
BASOPHILS # BLD AUTO: 0.03 X10(3) UL (ref 0–0.1)
BASOPHILS NFR BLD AUTO: 0.4 %
EOSINOPHIL # BLD AUTO: 0.5 X10(3) UL (ref 0–0.3)
EOSINOPHIL NFR BLD AUTO: 7.4 %
ERYTHROCYTE [DISTWIDTH] IN BLOOD BY AUTOMATED COUNT: 14.5 % (ref 11.5–16)
GLUCOSE BLD-MCNC: 220 MG/DL (ref 65–99)
HCT VFR BLD AUTO: 27.5 % (ref 34–50)
HGB BLD-MCNC: 9 G/DL (ref 12–16)
IMMATURE GRANULOCYTE COUNT: 0.03 X10(3) UL (ref 0–1)
IMMATURE GRANULOCYTE RATIO %: 0.4 %
INR BLD: 1.83 (ref 0.89–1.11)
LYMPHOCYTES # BLD AUTO: 1.26 X10(3) UL (ref 0.9–4)
LYMPHOCYTES NFR BLD AUTO: 18.7 %
MCH RBC QN AUTO: 28.2 PG (ref 27–33.2)
MCHC RBC AUTO-ENTMCNC: 32.7 G/DL (ref 31–37)
MCV RBC AUTO: 86.2 FL (ref 81–100)
MONOCYTES # BLD AUTO: 0.7 X10(3) UL (ref 0.1–0.6)
MONOCYTES NFR BLD AUTO: 10.4 %
NEUTROPHIL ABS PRELIM: 4.21 X10 (3) UL (ref 1.3–6.7)
NEUTROPHILS # BLD AUTO: 4.21 X10(3) UL (ref 1.3–6.7)
NEUTROPHILS NFR BLD AUTO: 62.7 %
P AXIS: 87 DEGREES
P-R INTERVAL: 132 MS
PSA SERPL DL<=0.01 NG/ML-MCNC: 21.4 SECONDS (ref 12–14.3)
Q-T INTERVAL: 486 MS
QRS DURATION: 180 MS
QTC CALCULATION (BEZET): 535 MS
R AXIS: -72 DEGREES
RBC # BLD AUTO: 3.19 X10(6)UL (ref 3.8–5.1)
RED CELL DISTRIBUTION WIDTH-SD: 44.4 FL (ref 35.1–46.3)
T AXIS: 101 DEGREES
VENTRICULAR RATE: 73 BPM
WBC # BLD AUTO: 6.7 X10(3) UL (ref 4–13)

## 2017-09-15 PROCEDURE — 71010 XR CHEST AP PORTABLE  (CPT=71010): CPT | Performed by: INTERNAL MEDICINE

## 2017-09-15 PROCEDURE — 99239 HOSP IP/OBS DSCHRG MGMT >30: CPT | Performed by: HOSPITALIST

## 2017-09-15 RX ORDER — DOXYCYCLINE HYCLATE 100 MG/1
100 CAPSULE ORAL EVERY 12 HOURS SCHEDULED
Status: DISCONTINUED | OUTPATIENT
Start: 2017-09-15 | End: 2017-09-15

## 2017-09-15 RX ORDER — DOXYCYCLINE HYCLATE 100 MG/1
100 CAPSULE ORAL EVERY 12 HOURS SCHEDULED
Qty: 6 CAPSULE | Refills: 0 | Status: ON HOLD | OUTPATIENT
Start: 2017-09-15 | End: 2018-02-26

## 2017-09-15 NOTE — PROGRESS NOTES
[facility]  Progress Note     Chloe Brito Patient Status:  Inpatient    1928 MRN [id number]   [facility] 5NW-A Attending Sonja Dixon, [address] [facility] Day # 5 PCP Kennedi Morgan MD     Chief Complaint: Abdominal pain    S: Patie Oral Once   • doxycycline  100 mg Intravenous Q12H   • docusate sodium  100 mg Oral BID   • Insulin Aspart Pen  1-5 Units Subcutaneous TID CC and HS   • Fluticasone Furoate-Vilanterol  1 puff Inhalation Daily   • Umeclidinium Bromide  1 puff Inhalation Rodney Smith

## 2017-09-15 NOTE — PLAN OF CARE
NURSING DISCHARGE NOTE    Discharged Home via Wheelchair. Accompanied by Support staff  Belongings Taken by patient/family. PIV removed. Discharge navigator complete. Discharge instructions reviewed with patient/family at bedside.  HME O2 tank sent

## 2017-09-15 NOTE — CM/SW NOTE
Spoke with pt's RN who stated pt will discharge to home today. Pt has now declined Pamela  services.   Pt's family stating they do not have a portable tank for DC today as no one is available to go to pt's home to bring it to the hospital.  Family can provide

## 2017-09-15 NOTE — SLP NOTE
SPEECH DAILY NOTE - INPATIENT    Evaluation Date: 09/15/17    ASSESSMENT & PLAN   ASSESSMENT  Pt seen for dysphagia tx to assess tolerance with recommended diet, ensure proper utilization of aspiration precautions and provide pt/family education.   Patient across 2 sessions.  MET     FOLLOW UP  Follow Up Needed: No  SLP Follow-up Date: 09/15/17  Number of Visits to Meet Established Goals: 1  Session: 1 of 1    If you have any questions, please contact Christian Dean 92  Pager [phone]

## 2017-09-15 NOTE — PLAN OF CARE
DISCHARGE PLANNING    • Discharge to home or other facility with appropriate resources Progressing        GASTROINTESTINAL - ADULT    • Minimal or absence of nausea and vomiting Progressing    • Maintains or returns to baseline bowel function Progressing pulmonary disease) ([facility]     Essential hypertension     Lung nodule     Right adrenal mass (HCC)     Compression fracture of thoracic vertebra (HCC)     Hip pain     Adenoma of right adrenal gland     Acute bronchitis     Benign essential HTN     Diabetes

## 2017-09-15 NOTE — PROGRESS NOTES
[facility]    Progress Note    Lovely Bates Patient Status:  Inpatient    1928 MRN [id number]   [facility] 5NW-A Attending Ja Case, [address] [facility] Day # 5 PCP Yobany Price MD     Subjective:  Lovely Bates is a(n) 80 ye scarring in the anterior aspect of the right upper lobe. There is bronchiectasis and worsening   consolidation in the posterior segment of the right upper lobe measuring 3.6 x 1.4 x 3.1 cm.   VASCULATURE:  Enlargement of the main pulmonary artery suggesting ventilation.  =====  CONCLUSION:       Low probability for pulmonary embolus.     Medications Reviewed:    Current Facility-Administered Medications:  0.9%  NaCl infusion  Intravenous Once   glucose (DEX4) oral liquid 15 g 15 g Oral Q15 Min PRN   Or      Gl Pantoprazole Sodium (PROTONIX) EC tab 40 mg 40 mg Oral QAM AC   aspirin-acetaminophen-caffeine (PAIN RELIEVER PLUS) per tab 1 tablet 1 tablet Oral Q4H PRN   atenolol (TENORMIN) tab 25 mg 25 mg Oral Daily Beta Blocker   And      chlorthalidone (HYGROTEN) complication, without long-term current use of insulin (HCC)     Malignant neoplasm of female breast ([address]     Malignant neoplasm of lung ([address]     Hyponatremia     Anemia of chronic disease     Renal insufficiency     Subtherapeutic international normaliz

## 2017-09-15 NOTE — PLAN OF CARE
Patient/Family Goals    • Patient/Family Long Term Goal Progressing    • Patient/Family Short Term Goal Progressing        SAFETY ADULT - FALL    • Free from fall injury Progressing        Received patient A/O x 2-3, VSS with no complaints of pain.  Tye Aguilera

## 2017-09-15 NOTE — DISCHARGE SUMMARY
[facility]  DISCHARGE SUMMARY     Alexandria Jason Patient Status:  Inpatient    1928 MRN [id number]   [facility] 5NW-A Attending No att. providers found   Hosp Day # 5 PCP Charmaine Garica MD     Date of Admission: 2017  Date dysuria swelling rashes. Patient has a history of lung cancer which has been treated with radiation treatment twice and she is actually due for a follow-up chest CT. She has not had any chemotherapy nor has she had surgery for lung cancer.   She also has PROAIR HFA      Inhale 2 puffs into the lungs every 4 to 6 hours as needed.    Quantity:  8 g  Refills:  1     albuterol sulfate (2.5 MG/3ML) 0.083% Nebu  Commonly known as:  VENTOLIN      Take 3 mL (2.5 mg total) by nebulization every 4 (four) hours as nee Follow-up appointment:   MD [address] [phone]    In 4 weeks  f/u hospital d/c with CT chest  -----------------------------------------------------------------------------------------------

## 2017-09-17 ENCOUNTER — APPOINTMENT (OUTPATIENT)
Dept: GENERAL RADIOLOGY | Facility: HOSPITAL | Age: 82
End: 2017-09-17
Attending: EMERGENCY MEDICINE
Payer: MEDICARE

## 2017-09-17 ENCOUNTER — HOSPITAL ENCOUNTER (EMERGENCY)
Facility: HOSPITAL | Age: 82
Discharge: HOME | End: 2017-09-17
Attending: EMERGENCY MEDICINE
Payer: MEDICARE

## 2017-09-17 VITALS
DIASTOLIC BLOOD PRESSURE: 59 MMHG | SYSTOLIC BLOOD PRESSURE: 122 MMHG | BODY MASS INDEX: 19.93 KG/M2 | HEART RATE: 92 BPM | HEIGHT: 66 IN | TEMPERATURE: 98 F | RESPIRATION RATE: 16 BRPM | OXYGEN SATURATION: 100 % | WEIGHT: 124 LBS

## 2017-09-17 DIAGNOSIS — K59.00 CONSTIPATION, UNSPECIFIED CONSTIPATION TYPE: Primary | ICD-10-CM

## 2017-09-17 LAB
ALBUMIN SERPL-MCNC: 2.8 G/DL (ref 3.5–4.8)
ALLENS TEST: POSITIVE
ALP LIVER SERPL-CCNC: 74 U/L (ref 55–142)
ALT SERPL-CCNC: 15 U/L (ref 14–54)
ARTERIAL BLD GAS O2 SATURATION: 96 % (ref 92–100)
ARTERIAL BLOOD GAS BASE EXCESS: 3.5
ARTERIAL BLOOD GAS HCO3: 28.2 MEQ/L (ref 22–26)
ARTERIAL BLOOD GAS PCO2: 43 MM HG (ref 35–45)
ARTERIAL BLOOD GAS PH: 7.43 (ref 7.35–7.45)
ARTERIAL BLOOD GAS PO2: 97 MM HG (ref 80–105)
AST SERPL-CCNC: 16 U/L (ref 15–41)
BASOPHILS # BLD AUTO: 0.02 X10(3) UL (ref 0–0.1)
BASOPHILS NFR BLD AUTO: 0.2 %
BILIRUB SERPL-MCNC: 0.6 MG/DL (ref 0.1–2)
BILIRUB UR QL STRIP.AUTO: NEGATIVE
BUN BLD-MCNC: 24 MG/DL (ref 8–20)
CALCIUM BLD-MCNC: 9.4 MG/DL (ref 8.3–10.3)
CALCULATED O2 SATURATION: 98 % (ref 92–100)
CARBOXYHEMOGLOBIN: 1.3 % SAT (ref 0–3)
CHLORIDE: 98 MMOL/L (ref 101–111)
CLARITY UR REFRACT.AUTO: CLEAR
CO2: 27 MMOL/L (ref 22–32)
COLOR UR AUTO: YELLOW
CREAT BLD-MCNC: 1.36 MG/DL (ref 0.55–1.02)
EOSINOPHIL # BLD AUTO: 0.02 X10(3) UL (ref 0–0.3)
EOSINOPHIL NFR BLD AUTO: 0.2 %
ERYTHROCYTE [DISTWIDTH] IN BLOOD BY AUTOMATED COUNT: 14.5 % (ref 11.5–16)
GLUCOSE BLD-MCNC: 99 MG/DL (ref 70–99)
GLUCOSE UR STRIP.AUTO-MCNC: NEGATIVE MG/DL
HCT VFR BLD AUTO: 29.3 % (ref 34–50)
HGB BLD-MCNC: 9.5 G/DL (ref 12–16)
IMMATURE GRANULOCYTE COUNT: 0.03 X10(3) UL (ref 0–1)
IMMATURE GRANULOCYTE RATIO %: 0.3 %
IONIZED CALCIUM: 1.25 MMOL/L (ref 1.12–1.32)
KETONES UR STRIP.AUTO-MCNC: NEGATIVE MG/DL
L/M: 2.5 L/MIN
LACTIC ACID ARTERIAL: <1.3 MMOL/L (ref 0.5–2)
LEUKOCYTE ESTERASE UR QL STRIP.AUTO: NEGATIVE
LIPASE: 119 U/L (ref 73–393)
LYMPHOCYTES # BLD AUTO: 1.06 X10(3) UL (ref 0.9–4)
LYMPHOCYTES NFR BLD AUTO: 12.3 %
M PROTEIN MFR SERPL ELPH: 6.4 G/DL (ref 6.1–8.3)
MCH RBC QN AUTO: 27.9 PG (ref 27–33.2)
MCHC RBC AUTO-ENTMCNC: 32.4 G/DL (ref 31–37)
MCV RBC AUTO: 85.9 FL (ref 81–100)
METHEMOGLOBIN: 0.5 % SAT (ref 0.4–1.5)
MONOCYTES # BLD AUTO: 0.76 X10(3) UL (ref 0.1–0.6)
MONOCYTES NFR BLD AUTO: 8.8 %
NEUTROPHIL ABS PRELIM: 6.76 X10 (3) UL (ref 1.3–6.7)
NEUTROPHILS # BLD AUTO: 6.76 X10(3) UL (ref 1.3–6.7)
NEUTROPHILS NFR BLD AUTO: 78.2 %
NITRITE UR QL STRIP.AUTO: NEGATIVE
PATIENT TEMPERATURE: 98.4 F
PH UR STRIP.AUTO: 5.5 [PH] (ref 4.5–8)
PLATELET # BLD AUTO: 238 10(3)UL (ref 150–450)
POTASSIUM BLOOD GAS: 3.1 MMOL/L (ref 3.6–5.1)
POTASSIUM SERPL-SCNC: 3.5 MMOL/L (ref 3.6–5.1)
PROT UR STRIP.AUTO-MCNC: NEGATIVE MG/DL
RBC # BLD AUTO: 3.41 X10(6)UL (ref 3.8–5.1)
RBC UR QL AUTO: NEGATIVE
RED CELL DISTRIBUTION WIDTH-SD: 44.1 FL (ref 35.1–46.3)
SODIUM BLOOD GAS: 134 MMOL/L (ref 136–144)
SODIUM SERPL-SCNC: 136 MMOL/L (ref 136–144)
SP GR UR STRIP.AUTO: 1.02 (ref 1–1.03)
TOTAL HEMOGLOBIN: 9.6 G/DL (ref 11.7–16)
UROBILINOGEN UR STRIP.AUTO-MCNC: 0.2 MG/DL
WBC # BLD AUTO: 8.7 X10(3) UL (ref 4–13)

## 2017-09-17 PROCEDURE — 99285 EMERGENCY DEPT VISIT HI MDM: CPT

## 2017-09-17 PROCEDURE — 83690 ASSAY OF LIPASE: CPT

## 2017-09-17 PROCEDURE — 36600 WITHDRAWAL OF ARTERIAL BLOOD: CPT | Performed by: EMERGENCY MEDICINE

## 2017-09-17 PROCEDURE — 84132 ASSAY OF SERUM POTASSIUM: CPT | Performed by: EMERGENCY MEDICINE

## 2017-09-17 PROCEDURE — 94640 AIRWAY INHALATION TREATMENT: CPT

## 2017-09-17 PROCEDURE — 80053 COMPREHEN METABOLIC PANEL: CPT

## 2017-09-17 PROCEDURE — 85025 COMPLETE CBC W/AUTO DIFF WBC: CPT

## 2017-09-17 PROCEDURE — 81003 URINALYSIS AUTO W/O SCOPE: CPT | Performed by: EMERGENCY MEDICINE

## 2017-09-17 PROCEDURE — 82803 BLOOD GASES ANY COMBINATION: CPT | Performed by: EMERGENCY MEDICINE

## 2017-09-17 PROCEDURE — 83050 HGB METHEMOGLOBIN QUAN: CPT | Performed by: EMERGENCY MEDICINE

## 2017-09-17 PROCEDURE — 82375 ASSAY CARBOXYHB QUANT: CPT | Performed by: EMERGENCY MEDICINE

## 2017-09-17 PROCEDURE — 80053 COMPREHEN METABOLIC PANEL: CPT | Performed by: EMERGENCY MEDICINE

## 2017-09-17 PROCEDURE — 82330 ASSAY OF CALCIUM: CPT | Performed by: EMERGENCY MEDICINE

## 2017-09-17 PROCEDURE — 71020 XR CHEST PA + LAT CHEST (CPT=71020): CPT | Performed by: EMERGENCY MEDICINE

## 2017-09-17 PROCEDURE — 84295 ASSAY OF SERUM SODIUM: CPT | Performed by: EMERGENCY MEDICINE

## 2017-09-17 PROCEDURE — 96361 HYDRATE IV INFUSION ADD-ON: CPT

## 2017-09-17 PROCEDURE — 96374 THER/PROPH/DIAG INJ IV PUSH: CPT

## 2017-09-17 PROCEDURE — 85025 COMPLETE CBC W/AUTO DIFF WBC: CPT | Performed by: EMERGENCY MEDICINE

## 2017-09-17 PROCEDURE — 83690 ASSAY OF LIPASE: CPT | Performed by: EMERGENCY MEDICINE

## 2017-09-17 PROCEDURE — 85018 HEMOGLOBIN: CPT | Performed by: EMERGENCY MEDICINE

## 2017-09-17 PROCEDURE — 74020 XR ABDOMEN, OBSTRUCTIVE SERIES (CPT=74020): CPT | Performed by: EMERGENCY MEDICINE

## 2017-09-17 PROCEDURE — 83605 ASSAY OF LACTIC ACID: CPT | Performed by: EMERGENCY MEDICINE

## 2017-09-17 RX ORDER — ONDANSETRON 2 MG/ML
4 INJECTION INTRAMUSCULAR; INTRAVENOUS ONCE
Status: COMPLETED | OUTPATIENT
Start: 2017-09-17 | End: 2017-09-17

## 2017-09-17 RX ORDER — SODIUM CHLORIDE 9 MG/ML
INJECTION, SOLUTION INTRAVENOUS CONTINUOUS
Status: DISCONTINUED | OUTPATIENT
Start: 2017-09-17 | End: 2017-09-17

## 2017-09-17 RX ORDER — IPRATROPIUM BROMIDE AND ALBUTEROL SULFATE 2.5; .5 MG/3ML; MG/3ML
3 SOLUTION RESPIRATORY (INHALATION) ONCE
Status: COMPLETED | OUTPATIENT
Start: 2017-09-17 | End: 2017-09-17

## 2017-09-17 RX ORDER — BISACODYL 10 MG
10 SUPPOSITORY, RECTAL RECTAL
Status: DISCONTINUED | OUTPATIENT
Start: 2017-09-17 | End: 2017-09-17

## 2017-09-17 RX ORDER — POLYETHYLENE GLYCOL 3350 17 G/17G
17 POWDER, FOR SOLUTION ORAL DAILY PRN
Qty: 12 EACH | Refills: 0 | Status: SHIPPED | OUTPATIENT
Start: 2017-09-17 | End: 2017-10-17

## 2017-09-17 NOTE — ED INITIAL ASSESSMENT (HPI)
complaining of abdomen pain and nausea. Recently discharged 3 days ago. Was admitted here with a diagnosis of colitis and was complaining of abdomen pain. Was not able to have colonoscopy cause pt unable to do prep.  Pt also complaining of burning with urin

## 2017-09-17 NOTE — ED PROVIDER NOTES
Patient Seen in: [facility] Emergency Department    History   Patient presents with:  Abdomen/Flank Pain (GI/)    Stated Complaint: abdomen pain    HPI    24-year-old female presents to the emergency department with complaints of upper abdominal p multinodular goiter     Dx in 6/2013 - thyroid U/S showed right upper nodule of 1.1 cm, right midpole nodule of 3.2 cm and left inferior nodule of 1.6 cm with microcalcifications .  Biopsy of all three benign   • Osteoarthrosis, unspecified whether generali Systems   All other systems reviewed and are negative. Positive for stated complaint: abdomen pain  Other systems are as noted in HPI. Constitutional and vital signs reviewed. All other systems reviewed and negative except as noted above.     PSF Notable for the following:        Result Value    BUN 24 (*)     Creatinine 1.36 (*)     GFR 35 (*)     Albumin 2.8 (*)     Potassium 3.5 (*)     Chloride 98 (*)     All other components within normal limits   ABG PANEL W ELECT AND LACTATE - Abnormal; Nota discussed with her that I did not feel that we could consider bringing her back in the hospital until we made certain that was not the cause of her symptoms.   Patient received Dulcolax and glycerin suppositories followed by a soapsuds enema and had large r

## 2017-09-18 ENCOUNTER — TELEPHONE (OUTPATIENT)
Dept: CASE MANAGEMENT | Age: 82
End: 2017-09-18

## 2017-09-18 ENCOUNTER — TELEPHONE (OUTPATIENT)
Dept: INTERNAL MEDICINE CLINIC | Facility: CLINIC | Age: 82
End: 2017-09-18

## 2017-09-18 ENCOUNTER — PATIENT OUTREACH (OUTPATIENT)
Dept: CASE MANAGEMENT | Age: 82
End: 2017-09-18

## 2017-09-18 DIAGNOSIS — Z02.9 ENCOUNTERS FOR ADMINISTRATIVE PURPOSE: ICD-10-CM

## 2017-09-18 RX ORDER — HYDROCODONE BITARTRATE AND ACETAMINOPHEN 5; 300 MG/1; MG/1
1 TABLET ORAL EVERY 8 HOURS PRN
Qty: 90 TABLET | Refills: 0 | Status: SHIPPED | OUTPATIENT
Start: 2017-09-18 | End: 2017-10-17

## 2017-09-18 RX ORDER — HYDROCODONE BITARTRATE AND ACETAMINOPHEN 5; 300 MG/1; MG/1
1 TABLET ORAL EVERY 8 HOURS PRN
Qty: 90 TABLET | Refills: 0 | Status: CANCELLED | OUTPATIENT
Start: 2017-09-18

## 2017-09-18 NOTE — TELEPHONE ENCOUNTER
S/w Guerrero Valencia ( on HIPPA)- states that she already s/w TCM and was scheduled for F/u. Pt was rescheduled to a later time d/t transpiration issues. Guerrero Valencia states that Pt is having nausea, it is not worst than before ED. Pt also has left sided abd pain.  Pain ribera

## 2017-09-18 NOTE — CM/SW NOTE
Patient discharged on 09/15/2017 as previously planned.      09/18/17 0800   Discharge disposition   Discharged to: Home or Self   Name of [facility]af[facility] services after discharge Patient refused services   Discharge transportation P

## 2017-09-18 NOTE — TELEPHONE ENCOUNTER
Called pt for TCM. Presley Allen and pt have many concerns at this time. Pt stated she feels horrible today and that she has not improved at all since coming home. Pt currently c/o constant nausea and left side abdominal pain.  Pt stated nothing seems to ma

## 2017-09-18 NOTE — PROGRESS NOTES
Initial Post Discharge Follow Up   Discharge Date: 9/17/17  Contact Date: 9/18/2017    Consent Verification:  Assessment Completed With: Patient Also spoke to daughter Carmine Granado, per written consent and per pt's request.  HIPAA Verified?   Yes     Discharge about your discharge instructions? • No  • Before leaving the hospital was your diagnoses explained to you? Yes  • Do you have any questions about your diagnoses?  No  • Are you able to perform normal daily activities of living as you have prior to your ho Aerosol Inhale 2 puffs into the lungs 2 (two) times daily. Disp:  Rfl:    Albuterol Sulfate HFA (PROAIR HFA) 108 (90 BASE) MCG/ACT Inhalation Aero Soln Inhale 2 puffs into the lungs every 4 to 6 hours as needed.  Disp: 8 g Rfl: 1   Cholecalciferol (VITAMIN the ED if needed. Needs post D/C:   Now that you are home, are there any needs or concerns you need addressed before your next visit with your PCP?  (DME, meds, disease concerns, Etc): Yes- see below.      Follow up appointments:  Reviewed FU appointm and to consider rescheduling her HFU with her PCP sooner. Pt verbalized understanding and will contact the office with any further questions or concerns. TE sent to PCP/triage.        [x]  Discharge Summary, Discharge medications reviewed/discussed/and

## 2017-09-18 NOTE — TELEPHONE ENCOUNTER
Daughter Geri Aguilar called stating pt was in [address] for 6 days with pneumonia and was released-pt had to go back yesterday to ER for constipation and nausea-they gave her enema and sent there home-pt still c/o nausea-call to advise

## 2017-09-19 NOTE — TELEPHONE ENCOUNTER
S/w Nicholas Murillo informed that zofran already has refills and that Hydrocodone will be at  for - verbalized understanding, agreed with POC and had no further questions.

## 2017-10-17 NOTE — TELEPHONE ENCOUNTER
Daughter called requesting new rx for   Hydrocodone-Acetaminophen 5-300 MG Oral Tab 90 tablet 0 9/18/2017    Sig :  Take 1 tablet by mouth every 8 (eight) hours as        Please call her when ready for

## 2017-10-17 NOTE — TELEPHONE ENCOUNTER
Last fill 9/18/17 for 90 tablets  LOV: 8/31/2017 with TB was in the ED on 9/17/17  Last Labs:9/17/17 cbc and cmp abnormal

## 2017-10-19 RX ORDER — HYDROCODONE BITARTRATE AND ACETAMINOPHEN 5; 300 MG/1; MG/1
1 TABLET ORAL EVERY 8 HOURS PRN
Qty: 90 TABLET | Refills: 0 | Status: SHIPPED | OUTPATIENT
Start: 2017-10-19 | End: 2017-11-16

## 2017-11-06 ENCOUNTER — MED REC SCAN ONLY (OUTPATIENT)
Dept: INTERNAL MEDICINE CLINIC | Facility: CLINIC | Age: 82
End: 2017-11-06

## 2017-11-16 NOTE — TELEPHONE ENCOUNTER
Hydrocodone-Acetaminophen 5-300 MG Oral Tab 90 tablet 0 10/19/2017    Sig :  Take 1 tablet by mouth every 8 (eight) hours as needed. Route:   Oral       Please call once ready for pick daughter will .

## 2017-11-17 RX ORDER — HYDROCODONE BITARTRATE AND ACETAMINOPHEN 5; 300 MG/1; MG/1
1 TABLET ORAL EVERY 8 HOURS PRN
Qty: 90 TABLET | Refills: 0 | Status: SHIPPED | OUTPATIENT
Start: 2017-11-17 | End: 2017-12-15

## 2017-11-27 RX ORDER — ONDANSETRON 4 MG/1
TABLET, FILM COATED ORAL
Qty: 30 TABLET | Refills: 1 | Status: SHIPPED | OUTPATIENT
Start: 2017-11-27 | End: 2018-01-30

## 2017-12-06 ENCOUNTER — MED REC SCAN ONLY (OUTPATIENT)
Dept: INTERNAL MEDICINE CLINIC | Facility: CLINIC | Age: 82
End: 2017-12-06

## 2017-12-15 RX ORDER — HYDROCODONE BITARTRATE AND ACETAMINOPHEN 5; 300 MG/1; MG/1
1 TABLET ORAL EVERY 8 HOURS PRN
Qty: 90 TABLET | Refills: 0 | Status: SHIPPED | OUTPATIENT
Start: 2017-12-15 | End: 2018-01-15

## 2017-12-15 NOTE — TELEPHONE ENCOUNTER
LFV: 8/31/2017 with TB  Future Appt: none on file  Last Rx:11/17/2017 90 tablets  Last Labs:9/17/17 abnormal cmp  Per protocol to provider

## 2017-12-15 NOTE — TELEPHONE ENCOUNTER
Daughter called to get new rx for Hydrocodone-Acetaminophen 5-300 MG Oral Tab-please call her when ready for

## 2017-12-15 NOTE — TELEPHONE ENCOUNTER
Called patient notified rx ready for pickup at the , pt will try to get her before close today or will stop in tomorrow morning.

## 2017-12-19 ENCOUNTER — TELEPHONE (OUTPATIENT)
Dept: INTERNAL MEDICINE CLINIC | Facility: CLINIC | Age: 82
End: 2017-12-19

## 2017-12-19 NOTE — TELEPHONE ENCOUNTER
Patient states she is sick to her stomach for 2 days, no vomiting, diarrhea, lightheadedness, dizziness or pain, doesn't feel well, refused to talk further as she was trying to nap.   Spoke to LOU pt's dtr who states she has been taking Hydrocodone for 10

## 2017-12-19 NOTE — TELEPHONE ENCOUNTER
Pt declined an appt. She stated hydrocodone is making her sick to her stomach. She would like to know if there is something different? Please advise.

## 2018-01-01 ENCOUNTER — MYAURORA ACCOUNT LINK (OUTPATIENT)
Dept: OTHER | Age: 83
End: 2018-01-01

## 2018-01-15 NOTE — TELEPHONE ENCOUNTER
Patient requests refill on the following medication:    Hydrocodone-Acetaminophen 5-300 MG Oral Tab 90 tablet 0 12/15/2017    Sig :  Take 1 tablet by mouth every 8 (eight) hours as needed.      Route:   Oral     Class:   Print Script     Order #: [id number]

## 2018-01-16 RX ORDER — HYDROCODONE BITARTRATE AND ACETAMINOPHEN 5; 300 MG/1; MG/1
1 TABLET ORAL EVERY 8 HOURS PRN
Qty: 90 TABLET | Refills: 0 | Status: SHIPPED | OUTPATIENT
Start: 2018-01-16 | End: 2018-01-30

## 2018-01-16 NOTE — TELEPHONE ENCOUNTER
Refill approved. Due for OV. Will not be able to give next one without an OV (30 min appt for her always)  On chronic narcotics and needs to come in q3-4 months.  Otherwise, I will not be able to continue refill for her

## 2018-01-16 NOTE — TELEPHONE ENCOUNTER
Called daughter Melany Rush and informed her rx ready for  and that pt will need appt prior to next refill-daughter understood

## 2018-01-18 ENCOUNTER — TELEPHONE (OUTPATIENT)
Dept: INTERNAL MEDICINE CLINIC | Facility: CLINIC | Age: 83
End: 2018-01-18

## 2018-01-18 RX ORDER — ONDANSETRON 4 MG/1
TABLET, FILM COATED ORAL
Qty: 30 TABLET | Refills: 0 | OUTPATIENT
Start: 2018-01-18

## 2018-01-18 NOTE — TELEPHONE ENCOUNTER
Medication(s) to Refill:   Pending Prescriptions Disp Refills    Ondansetron HCl (ZOFRAN) 4 mg tablet [Pharmacy Med Name: Ondansetron HCl Oral Tablet 4 MG] 30 tablet 0     Sig: take one tablet by mouth every 8 hours as needed for nausea           Last Time Medication was Filled:  08/31/2017    Last Office Visit with PCP: 8/31/2017    When Patient was Due Back to the Office:  (from when PCP last addressed medication)  [x] 1 month,  [] 3 months,  [] 6 months,  [] 12 months,  [x] Other Return in about 6 weeks ( around 10/12/2017)      No future appointments.      [] Prescription needs to be printed at site and faxed to pharmacy  []Controlled Substance, prescription needs to be printed at the site, site to notify patient when ready  Unable to Refill per Protocol:   [x] Office visit due    [] Blood Pressure out of range   [] Labs Overdue  []  Medication has never been prescribed by [facility] provider   [] Other     Last Blood Pressures:  BP Readings from Last 2 Encounters:  09/17/17 : 122/59  09/15/17 : 144/78

## 2018-01-23 NOTE — TELEPHONE ENCOUNTER
SW daughter Christian Argelia. Pt does not have a way to get here. Christian Argelia no longer drives. Will work on getting a ride. Provided Ride Assist number. Will call back.

## 2018-01-30 ENCOUNTER — OFFICE VISIT (OUTPATIENT)
Dept: INTERNAL MEDICINE CLINIC | Facility: CLINIC | Age: 83
End: 2018-01-30

## 2018-01-30 VITALS
DIASTOLIC BLOOD PRESSURE: 60 MMHG | OXYGEN SATURATION: 98 % | WEIGHT: 134 LBS | SYSTOLIC BLOOD PRESSURE: 106 MMHG | TEMPERATURE: 98 F | BODY MASS INDEX: 22 KG/M2 | HEART RATE: 62 BPM | RESPIRATION RATE: 16 BRPM

## 2018-01-30 DIAGNOSIS — E11.40 NEUROPATHY DUE TO TYPE 2 DIABETES MELLITUS (HCC): Primary | ICD-10-CM

## 2018-01-30 DIAGNOSIS — F32.A ANXIETY AND DEPRESSION: ICD-10-CM

## 2018-01-30 DIAGNOSIS — F41.9 ANXIETY AND DEPRESSION: ICD-10-CM

## 2018-01-30 DIAGNOSIS — R11.0 NAUSEA: ICD-10-CM

## 2018-01-30 DIAGNOSIS — F11.20 CHRONIC NARCOTIC DEPENDENCE (HCC): ICD-10-CM

## 2018-01-30 DIAGNOSIS — I48.91 ATRIAL FIBRILLATION, UNSPECIFIED TYPE (HCC): ICD-10-CM

## 2018-01-30 DIAGNOSIS — J44.9 CHRONIC OBSTRUCTIVE PULMONARY DISEASE, UNSPECIFIED COPD TYPE (HCC): ICD-10-CM

## 2018-01-30 LAB
CARTRIDGE LOT#: 803 NUMERIC
HEMOGLOBIN A1C: 6.2 % (ref 4.3–5.6)

## 2018-01-30 PROCEDURE — 83036 HEMOGLOBIN GLYCOSYLATED A1C: CPT | Performed by: INTERNAL MEDICINE

## 2018-01-30 PROCEDURE — 99214 OFFICE O/P EST MOD 30 MIN: CPT | Performed by: INTERNAL MEDICINE

## 2018-01-30 RX ORDER — WARFARIN SODIUM 5 MG/1
TABLET ORAL
Qty: 90 TABLET | Refills: 3 | Status: SHIPPED | OUTPATIENT
Start: 2018-01-30 | End: 2018-07-11

## 2018-01-30 RX ORDER — DOXYCYCLINE HYCLATE 100 MG/1
100 CAPSULE ORAL EVERY 12 HOURS SCHEDULED
Qty: 6 CAPSULE | Refills: 0 | Status: CANCELLED | OUTPATIENT
Start: 2018-01-30

## 2018-01-30 RX ORDER — HYDROCODONE BITARTRATE AND ACETAMINOPHEN 5; 300 MG/1; MG/1
1 TABLET ORAL EVERY 8 HOURS PRN
Qty: 90 TABLET | Refills: 0 | Status: SHIPPED | OUTPATIENT
Start: 2018-01-30 | End: 2018-03-12

## 2018-01-30 RX ORDER — ALBUTEROL SULFATE 90 UG/1
2 AEROSOL, METERED RESPIRATORY (INHALATION)
Qty: 8 G | Refills: 1 | Status: SHIPPED | OUTPATIENT
Start: 2018-01-30 | End: 2018-05-11 | Stop reason: ALTCHOICE

## 2018-01-30 RX ORDER — DULOXETIN HYDROCHLORIDE 60 MG/1
60 CAPSULE, DELAYED RELEASE ORAL DAILY
Qty: 90 CAPSULE | Refills: 3 | Status: ON HOLD | OUTPATIENT
Start: 2018-01-30 | End: 2019-01-01

## 2018-01-30 RX ORDER — ATENOLOL AND CHLORTHALIDONE 50; 25 MG/1; MG/1
0.5 TABLET ORAL DAILY
Qty: 45 TABLET | Refills: 3 | Status: SHIPPED | OUTPATIENT
Start: 2018-01-30 | End: 2018-05-04 | Stop reason: ALTCHOICE

## 2018-01-30 RX ORDER — ONDANSETRON 4 MG/1
TABLET, FILM COATED ORAL
Qty: 30 TABLET | Refills: 1 | Status: SHIPPED | OUTPATIENT
Start: 2018-01-30 | End: 2018-04-09

## 2018-01-30 NOTE — PROGRESS NOTES
Ne Doctor is a 80year old female. Patient presents with:  Medication Request: pain medication  Diabetes  Depression      HPI:     Patient here for f/u  Depression and anxiety - improved with cymbalta 60mg daily.  Appetite very good now, gained back mellitus type 2 in nonobese (HCC)     At risk for falling     CKD (chronic kidney disease) stage 3, GFR 30-59 ml/min     History of left breast cancer     Chronic atrial fibrillation (HCC)     Malignant neoplasm of left lung (HCC)     Anxiety     COPD exac RESPIMAT  MCG/ACT Inhalation Aero Soln INHALE 1 PUFF INTO THE LUNGS FOUR TIMES A DAY AS NEEDED Disp: 1 Inhaler Rfl: 2   METFORMIN HCL 1000 MG Oral Tab TAKE ONE TABLET BY MOUTH TWO TIMES A DAY WITH MEALS (Patient taking differently: take one tablet da placed in 2004 at [place]   • PONV (postoperative nausea and vomiting)    • Right adrenal mass ([address]     Dx in 2011: right adrenal mass measuring between 1 and 1.5 that seems to be an adenoma and  is stable on CT scans   • Subclinical hyperthyroidism     Dx normocephalic,ears and throat are clear  NECK: supple,no adenopathy  LUNGS: normal rate without respiratory distress, lungs clear to auscultation  CARDIO: irreg irreg nl S1 S2  GI: normal bowel sounds, no masses, HSM or tenderness  EXTREMITIES: no cyanosis Inhale 2 puffs into the lungs every 4 to 6 hours as needed. Warfarin Sodium 5 MG Oral Tab 90 tablet 3      Si tab daily      Hydrocodone-Acetaminophen 5-300 MG Oral Tab 90 tablet 0      Sig: Take 1 tablet by mouth every 8 (eight) hours as needed.

## 2018-02-14 ENCOUNTER — TELEPHONE (OUTPATIENT)
Dept: INTERNAL MEDICINE CLINIC | Facility: CLINIC | Age: 83
End: 2018-02-14

## 2018-02-14 NOTE — TELEPHONE ENCOUNTER
Let them know this is HER INSURANCE  giving problems. PA take a while so she may be waiting.    Other option would be to just pay out of pocket for it (I do not think it is very expensive, at least it did not use to be expensive)  Ok to do PA, on this for y

## 2018-02-14 NOTE — TELEPHONE ENCOUNTER
D[facility], Ana Fallon[facility] Alert saw Dr Vinicius Davis on 1/30/18 and was given a script for     Hydrocodone-Acetaminophen 5-300 MG Oral Tab 90 tablet 0 1/30/2018    Sig :  Take 1 tablet by mouth every 8 (eight) hours as needed.      Route:   Oral     Class:   Print

## 2018-02-15 NOTE — TELEPHONE ENCOUNTER
Grey Weinstein (Key: [id number])     Your information has been submitted to [employer] is reviewing the PA request and you will receive an electronic response.  You may check for the updated outcome later by reopening this request. The standard f

## 2018-02-26 ENCOUNTER — APPOINTMENT (OUTPATIENT)
Dept: GENERAL RADIOLOGY | Facility: HOSPITAL | Age: 83
End: 2018-02-26
Payer: MEDICARE

## 2018-02-26 ENCOUNTER — HOSPITAL ENCOUNTER (OUTPATIENT)
Facility: HOSPITAL | Age: 83
Setting detail: OBSERVATION
Discharge: HOME | End: 2018-02-27
Admitting: STUDENT IN AN ORGANIZED HEALTH CARE EDUCATION/TRAINING PROGRAM
Payer: MEDICARE

## 2018-02-26 DIAGNOSIS — J44.1 COPD EXACERBATION (HCC): Primary | ICD-10-CM

## 2018-02-26 LAB
ADENOVIRUS PCR:: NEGATIVE
ALBUMIN SERPL-MCNC: 3.2 G/DL (ref 3.5–4.8)
ALP LIVER SERPL-CCNC: 93 U/L (ref 55–142)
ALT SERPL-CCNC: 13 U/L (ref 14–54)
APTT PPP: 42.6 SECONDS (ref 25–34)
AST SERPL-CCNC: 15 U/L (ref 15–41)
ATRIAL RATE: 65 BPM
B PERT DNA SPEC QL NAA+PROBE: NEGATIVE
BASOPHILS # BLD AUTO: 0.03 X10(3) UL (ref 0–0.1)
BASOPHILS NFR BLD AUTO: 0.4 %
BILIRUB SERPL-MCNC: 0.4 MG/DL (ref 0.1–2)
BILIRUB UR QL STRIP.AUTO: NEGATIVE
BUN BLD-MCNC: 33 MG/DL (ref 8–20)
C PNEUM DNA SPEC QL NAA+PROBE: NEGATIVE
CALCIUM BLD-MCNC: 8.8 MG/DL (ref 8.3–10.3)
CHLORIDE: 105 MMOL/L (ref 101–111)
CO2: 25 MMOL/L (ref 22–32)
COLOR UR AUTO: YELLOW
CORONAVIRUS 229E PCR:: NEGATIVE
CORONAVIRUS HKU1 PCR:: NEGATIVE
CORONAVIRUS NL63 PCR:: NEGATIVE
CORONAVIRUS OC43 PCR:: NEGATIVE
CREAT BLD-MCNC: 1.49 MG/DL (ref 0.55–1.02)
EOSINOPHIL # BLD AUTO: 0.31 X10(3) UL (ref 0–0.3)
EOSINOPHIL NFR BLD AUTO: 4.1 %
ERYTHROCYTE [DISTWIDTH] IN BLOOD BY AUTOMATED COUNT: 14.3 % (ref 11.5–16)
EST. AVERAGE GLUCOSE BLD GHB EST-MCNC: 134 MG/DL (ref 68–126)
FLUAV RNA SPEC QL NAA+PROBE: NEGATIVE
FLUBV RNA SPEC QL NAA+PROBE: NEGATIVE
GLUCOSE BLD-MCNC: 138 MG/DL (ref 70–99)
GLUCOSE BLD-MCNC: 175 MG/DL (ref 65–99)
GLUCOSE BLD-MCNC: 204 MG/DL (ref 65–99)
GLUCOSE BLD-MCNC: 218 MG/DL (ref 65–99)
GLUCOSE BLD-MCNC: 301 MG/DL (ref 65–99)
GLUCOSE UR STRIP.AUTO-MCNC: 50 MG/DL
HBA1C MFR BLD HPLC: 6.3 % (ref ?–5.7)
HCT VFR BLD AUTO: 35.6 % (ref 34–50)
HGB BLD-MCNC: 11.4 G/DL (ref 12–16)
IMMATURE GRANULOCYTE COUNT: 0.02 X10(3) UL (ref 0–1)
IMMATURE GRANULOCYTE RATIO %: 0.3 %
INR BLD: 1.71 (ref 0.89–1.11)
KETONES UR STRIP.AUTO-MCNC: NEGATIVE MG/DL
LYMPHOCYTES # BLD AUTO: 1.41 X10(3) UL (ref 0.9–4)
LYMPHOCYTES NFR BLD AUTO: 18.5 %
M PROTEIN MFR SERPL ELPH: 6.5 G/DL (ref 6.1–8.3)
MCH RBC QN AUTO: 28.9 PG (ref 27–33.2)
MCHC RBC AUTO-ENTMCNC: 32 G/DL (ref 31–37)
MCV RBC AUTO: 90.4 FL (ref 81–100)
METAPNEUMOVIRUS PCR:: NEGATIVE
MONOCYTES # BLD AUTO: 0.6 X10(3) UL (ref 0.1–1)
MONOCYTES NFR BLD AUTO: 7.9 %
MYCOPLASMA PNEUMONIA PCR:: NEGATIVE
NEUTROPHIL ABS PRELIM: 5.27 X10 (3) UL (ref 1.3–6.7)
NEUTROPHILS # BLD AUTO: 5.27 X10(3) UL (ref 1.3–6.7)
NEUTROPHILS NFR BLD AUTO: 68.8 %
NITRITE UR QL STRIP.AUTO: NEGATIVE
P AXIS: 125 DEGREES
P-R INTERVAL: 142 MS
PARAINFLUENZA 1 PCR:: NEGATIVE
PARAINFLUENZA 2 PCR:: NEGATIVE
PARAINFLUENZA 3 PCR:: NEGATIVE
PARAINFLUENZA 4 PCR:: NEGATIVE
PH UR STRIP.AUTO: 5 [PH] (ref 4.5–8)
PLATELET # BLD AUTO: 165 10(3)UL (ref 150–450)
POTASSIUM SERPL-SCNC: 4.2 MMOL/L (ref 3.6–5.1)
PROT UR STRIP.AUTO-MCNC: NEGATIVE MG/DL
PSA SERPL DL<=0.01 NG/ML-MCNC: 20.3 SECONDS (ref 12–14.3)
Q-T INTERVAL: 492 MS
QRS DURATION: 172 MS
QTC CALCULATION (BEZET): 495 MS
R AXIS: -68 DEGREES
RBC # BLD AUTO: 3.94 X10(6)UL (ref 3.8–5.1)
RED CELL DISTRIBUTION WIDTH-SD: 47.4 FL (ref 35.1–46.3)
RHINOVIRUS/ENTERO PCR:: NEGATIVE
RSV RNA SPEC QL NAA+PROBE: NEGATIVE
SODIUM SERPL-SCNC: 138 MMOL/L (ref 136–144)
SP GR UR STRIP.AUTO: 1.02 (ref 1–1.03)
T AXIS: 97 DEGREES
TROPONIN: <0.046 NG/ML (ref ?–0.05)
UROBILINOGEN UR STRIP.AUTO-MCNC: <2 MG/DL
VENTRICULAR RATE: 61 BPM
WBC # BLD AUTO: 7.6 X10(3) UL (ref 4–13)

## 2018-02-26 PROCEDURE — 99219 INITIAL OBSERVATION CARE,LEVL II: CPT | Performed by: STUDENT IN AN ORGANIZED HEALTH CARE EDUCATION/TRAINING PROGRAM

## 2018-02-26 PROCEDURE — 71045 X-RAY EXAM CHEST 1 VIEW: CPT

## 2018-02-26 RX ORDER — IPRATROPIUM BROMIDE AND ALBUTEROL SULFATE 2.5; .5 MG/3ML; MG/3ML
3 SOLUTION RESPIRATORY (INHALATION)
Status: DISCONTINUED | OUTPATIENT
Start: 2018-02-26 | End: 2018-02-27

## 2018-02-26 RX ORDER — ATENOLOL 50 MG/1
50 TABLET ORAL
Status: CANCELLED | OUTPATIENT
Start: 2018-02-26

## 2018-02-26 RX ORDER — WARFARIN SODIUM 2.5 MG/1
2.5 TABLET ORAL
Status: DISCONTINUED | OUTPATIENT
Start: 2018-03-04 | End: 2018-02-26 | Stop reason: SDUPTHER

## 2018-02-26 RX ORDER — CHLORTHALIDONE 50 MG/1
12.5 TABLET ORAL DAILY
Status: DISCONTINUED | OUTPATIENT
Start: 2018-02-26 | End: 2018-02-27

## 2018-02-26 RX ORDER — DULOXETIN HYDROCHLORIDE 30 MG/1
60 CAPSULE, DELAYED RELEASE ORAL DAILY
Status: DISCONTINUED | OUTPATIENT
Start: 2018-02-26 | End: 2018-02-27

## 2018-02-26 RX ORDER — HYDROCODONE BITARTRATE AND ACETAMINOPHEN 5; 325 MG/1; MG/1
1 TABLET ORAL EVERY 8 HOURS PRN
Status: DISCONTINUED | OUTPATIENT
Start: 2018-02-26 | End: 2018-02-27

## 2018-02-26 RX ORDER — ONDANSETRON 2 MG/ML
4 INJECTION INTRAMUSCULAR; INTRAVENOUS EVERY 6 HOURS PRN
Status: DISCONTINUED | OUTPATIENT
Start: 2018-02-26 | End: 2018-02-27

## 2018-02-26 RX ORDER — FOLIC ACID 1 MG/1
1 TABLET ORAL DAILY
Status: DISCONTINUED | OUTPATIENT
Start: 2018-02-26 | End: 2018-02-27

## 2018-02-26 RX ORDER — IPRATROPIUM BROMIDE AND ALBUTEROL SULFATE 2.5; .5 MG/3ML; MG/3ML
3 SOLUTION RESPIRATORY (INHALATION) ONCE
Status: COMPLETED | OUTPATIENT
Start: 2018-02-26 | End: 2018-02-26

## 2018-02-26 RX ORDER — METHYLPREDNISOLONE SODIUM SUCCINATE 125 MG/2ML
60 INJECTION, POWDER, LYOPHILIZED, FOR SOLUTION INTRAMUSCULAR; INTRAVENOUS EVERY 12 HOURS
Status: DISCONTINUED | OUTPATIENT
Start: 2018-02-26 | End: 2018-02-27

## 2018-02-26 RX ORDER — ALBUTEROL SULFATE 2.5 MG/3ML
2.5 SOLUTION RESPIRATORY (INHALATION) EVERY 2 HOUR PRN
Status: DISCONTINUED | OUTPATIENT
Start: 2018-02-26 | End: 2018-02-27

## 2018-02-26 RX ORDER — AZITHROMYCIN 250 MG/1
250 TABLET, FILM COATED ORAL DAILY
Status: DISCONTINUED | OUTPATIENT
Start: 2018-02-27 | End: 2018-02-27

## 2018-02-26 RX ORDER — METHYLPREDNISOLONE SODIUM SUCCINATE 125 MG/2ML
125 INJECTION, POWDER, LYOPHILIZED, FOR SOLUTION INTRAMUSCULAR; INTRAVENOUS ONCE
Status: COMPLETED | OUTPATIENT
Start: 2018-02-26 | End: 2018-02-26

## 2018-02-26 RX ORDER — AZITHROMYCIN 250 MG/1
500 TABLET, FILM COATED ORAL DAILY
Status: COMPLETED | OUTPATIENT
Start: 2018-02-26 | End: 2018-02-26

## 2018-02-26 RX ORDER — ATENOLOL 25 MG/1
25 TABLET ORAL
Status: DISCONTINUED | OUTPATIENT
Start: 2018-02-26 | End: 2018-02-27

## 2018-02-26 RX ORDER — ACETAMINOPHEN 325 MG/1
650 TABLET ORAL EVERY 6 HOURS PRN
Status: DISCONTINUED | OUTPATIENT
Start: 2018-02-26 | End: 2018-02-27

## 2018-02-26 RX ORDER — ATENOLOL AND CHLORTHALIDONE 50; 25 MG/1; MG/1
0.5 TABLET ORAL DAILY
Status: DISCONTINUED | OUTPATIENT
Start: 2018-02-26 | End: 2018-02-26 | Stop reason: SDUPTHER

## 2018-02-26 RX ORDER — DEXTROSE MONOHYDRATE 25 G/50ML
50 INJECTION, SOLUTION INTRAVENOUS
Status: DISCONTINUED | OUTPATIENT
Start: 2018-02-26 | End: 2018-02-27

## 2018-02-26 RX ORDER — WARFARIN SODIUM 5 MG/1
5 TABLET ORAL
Status: COMPLETED | OUTPATIENT
Start: 2018-02-26 | End: 2018-02-26

## 2018-02-26 RX ORDER — SODIUM CHLORIDE 9 MG/ML
INJECTION, SOLUTION INTRAVENOUS CONTINUOUS
Status: ACTIVE | OUTPATIENT
Start: 2018-02-26 | End: 2018-02-26

## 2018-02-26 RX ORDER — WARFARIN SODIUM 5 MG/1
5 TABLET ORAL
Status: DISCONTINUED | OUTPATIENT
Start: 2018-02-26 | End: 2018-02-26 | Stop reason: SDUPTHER

## 2018-02-26 RX ORDER — ONDANSETRON 2 MG/ML
4 INJECTION INTRAMUSCULAR; INTRAVENOUS EVERY 6 HOURS PRN
Status: DISCONTINUED | OUTPATIENT
Start: 2018-02-26 | End: 2018-02-26

## 2018-02-26 NOTE — CONSULTS
[facility] Dosing Service  Warfarin (Coumadin) Initial Dosing    Lidya Morfin is a 80year old female for whom pharmacy has been consulted to dose warfarin (COUMADIN) for A.fib  by Dr. Raphael Albarado. Based on this indication, goal INR is 2-3.     Pertinent

## 2018-02-26 NOTE — PHYSICAL THERAPY NOTE
PHYSICAL THERAPY EVALUATION - INPATIENT     Room Number: 805/312-J  Evaluation Date: 2/26/2018  Type of Evaluation: Initial  Physician Order: PT Eval and Treat    Presenting Problem: COPD exacerbation  Reason for Therapy: Mobility Dysfunction and Dis in 2011: right adrenal mass measuring between 1 and 1.5 that seems to be an adenoma and  is stable on CT scans   • Subclinical hyperthyroidism     Dx in 6/2013 - due to multinodular goiter   • Type II or unspecified type diabetes mellitus without mention o follows one step commands with repetition  · Motor Planning: intact  · Safety Judgement:  decreased awareness of need for assistance and decreased awareness of need for safety        RANGE OF MOTION AND STRENGTH ASSESSMENT  Upper extremity ROM and strength in hallway x150' with Rw at supervision. Pt demos ambulation with straight cane x15', unsteady, increased RAVIN, requires Alananh. Return to bedside chair, supervision to complete stand>sit. Discussed HHPT recommendation, pt and daughter do not seem interested. Good  Frequency (Obs): 5x/week  Number of Visits to Meet Established Goals: 3      CURRENT GOALS        Goal #2 Patient is able to demonstrate transfers EOB to/from Chair/Wheelchair at assistance level: modified independent     Goal #3 Patient is able to a

## 2018-02-26 NOTE — PROGRESS NOTES
NURSING ADMISSION NOTE      Patient admitted via Cart  Oriented to room. Safety precautions initiated. Bed in low position. Call light in reach. Pt from ER. Admission navigator completed with pt and daughter. Pt alert and oriented x 3-4.  Forgetfu

## 2018-02-26 NOTE — PROGRESS NOTES
Seen this afternoon- daughter at bedside  Pt asking same question over repeated this afternoon- but knows in hospital- wants to go home.   Had c/o \"CP\" earlier  follows  w/ Dr Remi Luna and Dr Paige Arellano  For pulm/ cards as outpt     CXR reviewed- not coughing

## 2018-02-26 NOTE — ED PROVIDER NOTES
Patient Seen in: [facility] Emergency Department    History   Patient presents with:  Cough/URI    Stated Complaint: cough    HPI    Pleasant 80year-old with COPD presenting to the ER with complaint that she has been coughing more overnight tonight thyroid U/S showed right upper nodule of 1.1 cm, right midpole nodule of 3.2 cm and left inferior nodule of 1.6 cm with microcalcifications .  Biopsy of all three benign   • Osteoarthrosis, unspecified whether generalized or localized, unspecified site    • Temporal  SpO2: 96 %  O2 Device: Nasal cannula    Current:/52   Pulse 62   Temp (!) 97.1 °F (36.2 °C) (Temporal)   Resp 18   Ht 167.6 cm (5' 6\")   Wt 59.9 kg   SpO2 96%   BMI 21.31 kg/m²         Physical Exam    GENERAL APPEARANCE:     Well develope were created for panel order CBC WITH DIFFERENTIAL WITH PLATELET.   Procedure                               Abnormality         Status                     ---------                               -----------         ------                     CBC W/ DIFFERANALISA

## 2018-02-26 NOTE — H&P
[facility]  History and Physical     Lendell Moment Patient Status:  Emergency    1928 MRN [id number]   Location [address] Attending Terra López MD   Hosp Day # 0 PCP Rhiannon Ny MD     Chief Complaint: adrenal mass measuring between 1 and 1.5 that seems to be an adenoma and  is stable on CT scans   • Subclinical hyperthyroidism     Dx in 6/2013 - due to multinodular goiter   • Type II or unspecified type diabetes mellitus without mention of complication, encounter.    Current Outpatient Prescriptions on File Prior to Encounter:  Ondansetron HCl (ZOFRAN) 4 mg tablet take one tablet by mouth every 8 hours as needed for nausea Disp: 30 tablet Rfl: 1   Atenolol-Chlorthalidone 50-25 MG Oral Tab Take 0.5 tablets oriented x 3. HEENT: Normocephalic atraumatic. Moist mucous membranes. EOM-I. PERRLA. Anicteric. Neck: No lymphadenopathy. No JVD. No carotid bruits. Respiratory: decreased air entry. No wheezes. No rhonchi.  Normal effort  Cardiovascular: S1, S2. Regula

## 2018-02-26 NOTE — PROGRESS NOTES
Multidisciplinary Discharge Rounds held 2/26/2018. Treatment team members present today include , , Charge Nurse,  Nurse, RT, PT and Pharmacy caring for [employer].      Other care providers present:    Patient Active Probl Closed fracture of distal end of right radius with routine healing, unspecified fracture morphology, subsequent encounter     Colitis     Nausea     Coagulopathy (HCC)     Failure to thrive (0-17)     Chronic obstructive pulmonary disease (HCC)     Type 2

## 2018-02-26 NOTE — CM/SW NOTE
Patient has own home 02 per HME. Declines Home health at this time. CM/SW following.     02/26/18 1400   CM/SW Referral Data   Referral Source    Reason for Referral Discharge planning   Informant Patient; Children   Pertinent Medical Hx   Prim[employer]

## 2018-02-27 VITALS
DIASTOLIC BLOOD PRESSURE: 41 MMHG | WEIGHT: 132.06 LBS | SYSTOLIC BLOOD PRESSURE: 106 MMHG | TEMPERATURE: 98 F | BODY MASS INDEX: 21.22 KG/M2 | OXYGEN SATURATION: 99 % | HEART RATE: 89 BPM | HEIGHT: 66 IN | RESPIRATION RATE: 18 BRPM

## 2018-02-27 LAB
BASOPHILS # BLD AUTO: 0.01 X10(3) UL (ref 0–0.1)
BASOPHILS NFR BLD AUTO: 0.1 %
BUN BLD-MCNC: 39 MG/DL (ref 8–20)
CALCIUM BLD-MCNC: 9.6 MG/DL (ref 8.3–10.3)
CHLORIDE: 102 MMOL/L (ref 101–111)
CO2: 28 MMOL/L (ref 22–32)
CREAT BLD-MCNC: 1.39 MG/DL (ref 0.55–1.02)
EOSINOPHIL # BLD AUTO: 0 X10(3) UL (ref 0–0.3)
EOSINOPHIL NFR BLD AUTO: 0 %
ERYTHROCYTE [DISTWIDTH] IN BLOOD BY AUTOMATED COUNT: 14.5 % (ref 11.5–16)
GLUCOSE BLD-MCNC: 147 MG/DL (ref 70–99)
GLUCOSE BLD-MCNC: 170 MG/DL (ref 65–99)
GLUCOSE BLD-MCNC: 252 MG/DL (ref 65–99)
HCT VFR BLD AUTO: 30.9 % (ref 34–50)
HGB BLD-MCNC: 9.8 G/DL (ref 12–16)
IMMATURE GRANULOCYTE COUNT: 0.06 X10(3) UL (ref 0–1)
IMMATURE GRANULOCYTE RATIO %: 0.6 %
INR BLD: 1.63 (ref 0.89–1.11)
LYMPHOCYTES # BLD AUTO: 0.69 X10(3) UL (ref 0.9–4)
LYMPHOCYTES NFR BLD AUTO: 7.3 %
MCH RBC QN AUTO: 28.1 PG (ref 27–33.2)
MCHC RBC AUTO-ENTMCNC: 31.7 G/DL (ref 31–37)
MCV RBC AUTO: 88.5 FL (ref 81–100)
MONOCYTES # BLD AUTO: 0.65 X10(3) UL (ref 0.1–1)
MONOCYTES NFR BLD AUTO: 6.8 %
NEUTROPHIL ABS PRELIM: 8.09 X10 (3) UL (ref 1.3–6.7)
NEUTROPHILS # BLD AUTO: 8.09 X10(3) UL (ref 1.3–6.7)
NEUTROPHILS NFR BLD AUTO: 85.2 %
PLATELET # BLD AUTO: 149 10(3)UL (ref 150–450)
POTASSIUM SERPL-SCNC: 4.3 MMOL/L (ref 3.6–5.1)
PSA SERPL DL<=0.01 NG/ML-MCNC: 19.3 SECONDS (ref 12–14.3)
RBC # BLD AUTO: 3.49 X10(6)UL (ref 3.8–5.1)
RED CELL DISTRIBUTION WIDTH-SD: 46.1 FL (ref 35.1–46.3)
SODIUM SERPL-SCNC: 136 MMOL/L (ref 136–144)
WBC # BLD AUTO: 9.5 X10(3) UL (ref 4–13)

## 2018-02-27 PROCEDURE — 99217 OBSERVATION CARE DISCHARGE: CPT | Performed by: NURSE PRACTITIONER

## 2018-02-27 RX ORDER — IPRATROPIUM BROMIDE AND ALBUTEROL SULFATE 2.5; .5 MG/3ML; MG/3ML
3 SOLUTION RESPIRATORY (INHALATION)
Status: DISCONTINUED | OUTPATIENT
Start: 2018-02-27 | End: 2018-02-27

## 2018-02-27 RX ORDER — AZITHROMYCIN 250 MG/1
250 TABLET, FILM COATED ORAL DAILY
Qty: 4 TABLET | Refills: 0 | Status: SHIPPED | OUTPATIENT
Start: 2018-02-27 | End: 2018-03-03

## 2018-02-27 RX ORDER — PREDNISONE 20 MG/1
40 TABLET ORAL DAILY
Qty: 4 TABLET | Refills: 0 | Status: SHIPPED | OUTPATIENT
Start: 2018-02-27 | End: 2018-03-03

## 2018-02-27 RX ORDER — WARFARIN SODIUM 7.5 MG/1
7.5 TABLET ORAL
Status: DISCONTINUED | OUTPATIENT
Start: 2018-02-27 | End: 2018-02-27

## 2018-02-27 NOTE — PROGRESS NOTES
NURSING DISCHARGE NOTE    Discharged Home via Wheelchair. Accompanied by Family member  Belongings Taken by patient/family. Iv discontinued. VSS. Patient discharged to home with her daughter.  Discharge instructions and prescriptions given to emily

## 2018-02-27 NOTE — PROGRESS NOTES
[facility]  Progress Note     Lidya Morfin Patient Status:  Observation    1928 MRN [id number]   [facility] 5NW-A Attending No att. providers found   Hosp Day # 0 PCP Mason Forde MD     Chief Complaint: sob    S: Patient • Warfarin Sodium  7.5 mg Oral Once at night   • Fluticasone Furoate-Vilanterol  1 puff Inhalation Daily   • Umeclidinium Bromide  1 puff Inhalation Daily   • DULoxetine HCl  60 mg Oral Daily   • folic acid  1 mg Oral Daily   • MethylPREDNISolone Sodium

## 2018-02-27 NOTE — PHYSICAL THERAPY NOTE
PHYSICAL THERAPY TREATMENT NOTE - INPATIENT    Room Number: 710/912-B     Session: 1   Number of Visits to Meet Established Goals: 3    Presenting Problem: COPD exacerbation    Problem List  Principal Problem:    COPD exacerbation [facility])      Past Medical impairment        Past Surgical History  Past Surgical History:  No date: APPENDECTOMY  No date: CATARACT      Comment: Bilateral cataract extractions  No date: COLONOSCOPY  No date: YOAV NEEDLE LOCALIZATION W/ SPECIMEN 1 SITE RIG*  No date: MASTECTOMY LEFT Assessment   Gait Assistance: Minimum assistance (Actual: CGA)  Distance (ft): 200'  Assistive Device: Rolling walker  Pattern: Within Functional Limits          Skilled Therapy Provided: Pt received, pt ambulating within room without Ad, returning to [facility] Goals established on 2/26/2018

## 2018-02-27 NOTE — PROGRESS NOTES
[facility] Dosing Service  Warfarin (Coumadin) Subsequent Dosing    Marizol Ballard is a 80year old female for whom pharmacy has been dosing warfarin (Coumadin) for A.fib  by Dr. Isidro Winters. Based on this indication, goal INR is 2-3.     Recent Labs   Lab

## 2018-02-28 ENCOUNTER — PATIENT OUTREACH (OUTPATIENT)
Dept: CASE MANAGEMENT | Age: 83
End: 2018-02-28

## 2018-02-28 ENCOUNTER — TELEPHONE (OUTPATIENT)
Dept: CASE MANAGEMENT | Age: 83
End: 2018-02-28

## 2018-02-28 DIAGNOSIS — Z02.9 ENCOUNTERS FOR ADMINISTRATIVE PURPOSE: ICD-10-CM

## 2018-02-28 NOTE — CM/SW NOTE
Patient discharged on 02/27/2018 as previously planned.        02/28/18 0800   Discharge disposition   Discharged to: Home or [address] [place] [address] services after discharge Patient refused services   Discharge transportation Private car

## 2018-02-28 NOTE — PROGRESS NOTES
Initial Post Discharge Follow Up   Discharge Date: 2/27/18  Contact Date: 2/28/2018    Consent Verification:  Assessment Completed With: Other: Letty Bell received per patient?  written- pt's daughter  HIPAA Verified?   Yes    Discharge Dx:   COPD Atenolol-Chlorthalidone 50-25 MG Oral Tab Take 0.5 tablets by mouth daily. Disp: 45 tablet Rfl: 3   DULoxetine HCl 60 MG Oral Cap DR Particles Take 1 capsule (60 mg total) by mouth daily.  Disp: 90 capsule Rfl: 3   Albuterol Sulfate HFA (PROAIR HFA) 108 ( stated the pt did not want an RN/PT at the house all the time however the dtr would like for an RN to come to the house to help with blood draws and check on the pt a few times. DME ordered at D/C? No    Services ordered at D/C?   Yes   What services: options are available and dtr declined stating she will talk to other family members to see if they can help. NCM Reviewed upcoming Specialist Appt with patient     No     Overall Rating:    How would you rate the care you received while in the [facility]

## 2018-02-28 NOTE — DISCHARGE SUMMARY
[facility] HOSPITALIST  DISCHARGE SUMMARY     Lidya Morfin Patient Status:  Observation    1928 MRN [id number]   [facility] 5NW-A Attending No att. providers found   Hosp Day # 0 PCP Mason Forde MD     Date of Admission: 2018  D March 2017. This is an issue daughter and patient are unable to get to the hospital per daughter and she is no longer driving. Stressed the importance for management.   She is was subtherapeutic at time of admission has gone out on her normal dosing but n CONTINUE taking these medications      Instructions Prescription details   Albuterol Sulfate  (90 Base) MCG/ACT Aers  Commonly known as:  PROAIR HFA      Inhale 2 puffs into the lungs every 4 to 6 hours as needed.    Quantity:  8 g  Refills:  1 MD Sofie Gordillo [phone]  [phone]            Vital signs:  Temp:  [97.6 °F (36.4 °C)-98.4 °F (36.9 °C)] 98.3 °F (36.8 °C)  Pulse:  [] 89  Resp:  [18] 18  BP: (106

## 2018-02-28 NOTE — TELEPHONE ENCOUNTER
Contacted the pt for TCM. Spoke to dtr, Suzzanne Leventhal. Pt currently does not have her HFU appointment scheduled. An appointment was offered with PCP however the dtr declined stating transportation is an issue for them as she and the pt do not drive.  Offered to see

## 2018-03-02 ENCOUNTER — TELEPHONE (OUTPATIENT)
Dept: RESPIRATORY THERAPY | Facility: HOSPITAL | Age: 83
End: 2018-03-02

## 2018-03-07 ENCOUNTER — PATIENT OUTREACH (OUTPATIENT)
Dept: CASE MANAGEMENT | Age: 83
End: 2018-03-07

## 2018-03-07 ENCOUNTER — TELEPHONE (OUTPATIENT)
Dept: CASE MANAGEMENT | Age: 83
End: 2018-03-07

## 2018-03-07 NOTE — TELEPHONE ENCOUNTER
Pt has not scheduled her HFU appointment yet. Contacting the pt/dtr to follow up. TCM/HFU is recommended by 3/13/18. Spoke to dtr Marifer per CallmyName.  An appointment was offered with PCP however the dtr declined stating she still does not have transportation s

## 2018-03-12 ENCOUNTER — TELEPHONE (OUTPATIENT)
Dept: INTERNAL MEDICINE CLINIC | Facility: CLINIC | Age: 83
End: 2018-03-12

## 2018-03-12 DIAGNOSIS — F11.20 CHRONIC NARCOTIC DEPENDENCE (HCC): ICD-10-CM

## 2018-03-12 DIAGNOSIS — E11.40 NEUROPATHY DUE TO TYPE 2 DIABETES MELLITUS (HCC): ICD-10-CM

## 2018-03-12 RX ORDER — HYDROCODONE BITARTRATE AND ACETAMINOPHEN 5; 300 MG/1; MG/1
1 TABLET ORAL EVERY 8 HOURS PRN
Qty: 90 TABLET | Refills: 0 | Status: SHIPPED | OUTPATIENT
Start: 2018-03-12 | End: 2018-05-11

## 2018-03-12 NOTE — TELEPHONE ENCOUNTER
Patient's daughter states Toni Cummings needs a refill on the following:    Hydrocodone-Acetaminophen 5-300 MG Oral Tab 90 tablet 0 1/30/2018    Sig :  Take 1 tablet by mouth every 8 (eight) hours as needed.      Route:   Oral     Class:   Print Script     Order

## 2018-03-12 NOTE — TELEPHONE ENCOUNTER
Called patient daughter per HIPAA consent letting her know patient RX is ready for . Patient daughter states understanding.

## 2018-03-12 NOTE — TELEPHONE ENCOUNTER
LOV: 1/30/18 TB   Future office visit: No upcoming visit   Last labs: 2/27/18 Bmp cbc  Last RX:Hydrocodone 1/30/18 #90 No Refills  Per protocol: Route to provider

## 2018-03-14 NOTE — TELEPHONE ENCOUNTER
I just gave patient a script on 3/12 so need to know if she picked up the medication? If no, let her know we can switch to Lily Joel 5 /325 which is very similar to her current medication.  The change is due to her insurance

## 2018-03-14 NOTE — TELEPHONE ENCOUNTER
[facility] [employer] was advised medication was denied however the following dose would be approved. Please advise. Hydrocodone 5/325mg, 7.5/325, or 10/325.

## 2018-03-14 NOTE — TELEPHONE ENCOUNTER
Spoke with Angella-daughter, states patient does have prescription for [address] waiting at pharmacy, has not been able to pick it up due to not being able to drive nor having a car, will  as soon as she can.

## 2018-03-26 ENCOUNTER — TELEPHONE (OUTPATIENT)
Dept: INTERNAL MEDICINE CLINIC | Facility: CLINIC | Age: 83
End: 2018-03-26

## 2018-03-26 NOTE — TELEPHONE ENCOUNTER
Pt's daughter, Muriel Feldman,  calling to say Pt had a bruise on upper right arm that is concerned weather or not she has too much coumadin. Pt does need an INR done, It is very difficult for  Pt to get out to have it done.    It was talked about when Pt was in  th

## 2018-03-26 NOTE — TELEPHONE ENCOUNTER
Called and spoke with pt's daughter, Luis Angel Gilmore, to inform, per TB, time for patient to have a visiting home doctor.  Advised will need to see pt in clinic for post hospitalization, chronic narcotics etc at least every 3 months and aware it is hard for pt to com

## 2018-03-26 NOTE — TELEPHONE ENCOUNTER
Raj Mata, pt's dtr indicates pt has a bruise from shoulder to past elbow for a few days,pt was in the hospital for COPD exacerbation, nurses there mentioned maybe PCP could arrange pt to have INR's done at home since her dtr Raj Mata no longer drives(no other fa

## 2018-03-26 NOTE — TELEPHONE ENCOUNTER
It may be time for patient to have a visiting home doctor as I need to see her in clinic for post hospitalization, chronic narcotics etc at least every 3 months and I know it is hard for her to come in.   Cardiology manages INR and I am not sure if they hav

## 2018-03-27 ENCOUNTER — OFFICE VISIT (OUTPATIENT)
Dept: INTERNAL MEDICINE CLINIC | Facility: CLINIC | Age: 83
End: 2018-03-27

## 2018-03-27 ENCOUNTER — APPOINTMENT (OUTPATIENT)
Dept: LAB | Age: 83
End: 2018-03-27
Attending: INTERNAL MEDICINE
Payer: MEDICARE

## 2018-03-27 VITALS
SYSTOLIC BLOOD PRESSURE: 108 MMHG | RESPIRATION RATE: 16 BRPM | DIASTOLIC BLOOD PRESSURE: 64 MMHG | HEART RATE: 76 BPM | BODY MASS INDEX: 22 KG/M2 | WEIGHT: 134.63 LBS | TEMPERATURE: 98 F

## 2018-03-27 DIAGNOSIS — I48.20 CHRONIC ATRIAL FIBRILLATION (HCC): ICD-10-CM

## 2018-03-27 DIAGNOSIS — F11.20 CHRONIC NARCOTIC DEPENDENCE (HCC): ICD-10-CM

## 2018-03-27 DIAGNOSIS — J44.1 COPD EXACERBATION (HCC): ICD-10-CM

## 2018-03-27 DIAGNOSIS — E11.69 TYPE 2 DIABETES MELLITUS WITH OTHER SPECIFIED COMPLICATION, WITHOUT LONG-TERM CURRENT USE OF INSULIN (HCC): ICD-10-CM

## 2018-03-27 DIAGNOSIS — T14.8XXA BRUISING: Primary | ICD-10-CM

## 2018-03-27 DIAGNOSIS — Z79.01 CHRONIC ANTICOAGULATION: ICD-10-CM

## 2018-03-27 DIAGNOSIS — E11.40 NEUROPATHY DUE TO TYPE 2 DIABETES MELLITUS (HCC): ICD-10-CM

## 2018-03-27 DIAGNOSIS — T14.8XXA BRUISING: ICD-10-CM

## 2018-03-27 LAB
INR BLD: 1.12 (ref 0.9–1.1)
PSA SERPL DL<=0.01 NG/ML-MCNC: 14.9 SECONDS (ref 12.4–14.7)

## 2018-03-27 PROCEDURE — 99214 OFFICE O/P EST MOD 30 MIN: CPT | Performed by: INTERNAL MEDICINE

## 2018-03-27 PROCEDURE — 85610 PROTHROMBIN TIME: CPT

## 2018-03-27 PROCEDURE — 1111F DSCHRG MED/CURRENT MED MERGE: CPT | Performed by: INTERNAL MEDICINE

## 2018-03-27 RX ORDER — HYDROCODONE BITARTRATE AND ACETAMINOPHEN 5; 325 MG/1; MG/1
TABLET ORAL
Qty: 90 TABLET | Refills: 0 | Status: ON HOLD | OUTPATIENT
Start: 2018-04-01 | End: 2018-05-02

## 2018-03-27 NOTE — PROGRESS NOTES
Court Alonso is a 80year old female. Patient presents with:  Hospital F/U: Room 9 [facility] _ Admitted for 2 days. Was kept for observation due to COPD       HPI:     Patient admitted over 2 weeks ago for COPD exacerbation.  s/p IV steroids, zithromax and no Right adrenal mass (HCC)     Compression fracture of thoracic vertebra (HCC)     Hip pain     Adenoma of right adrenal gland     Acute bronchitis     Benign essential HTN     Diabetes mellitus type 2 in nonobese (HCC)     At risk for falling     CKD (chron Inhalation Aero Soln Inhale 2 puffs into the lungs every 4 to 6 hours as needed.  Disp: 8 g Rfl: 1   Warfarin Sodium 5 MG Oral Tab 1 tab daily Disp: 90 tablet Rfl: 3   COMBIVENT RESPIMAT  MCG/ACT Inhalation Aero Soln INHALE 1 PUFF INTO THE LUNGS FOUR whether generalized or localized, unspecified site    • Pacemaker     Pacemaker placed in 2004 at [employer]   • PONV (postoperative nausea and vomiting)    • Right adrenal mass ([facility] [address]     Dx in 2011: right adrenal mass measuring between 1 and 1.5 that seems to GENERAL: frail  SKIN: both arms with multiple bruises  HEENT: atraumatic, normocephalic  NECK: supple,no adenopathy  LUNGS: normal rate without respiratory distress, lungs clear to auscultation  CARDIO: irreg nl S1 S2  GI: normal bowel sounds, soft, NT/N

## 2018-04-04 ENCOUNTER — TELEPHONE (OUTPATIENT)
Dept: INTERNAL MEDICINE CLINIC | Facility: CLINIC | Age: 83
End: 2018-04-04

## 2018-04-09 DIAGNOSIS — R11.0 NAUSEA: ICD-10-CM

## 2018-04-09 RX ORDER — ONDANSETRON 4 MG/1
TABLET, FILM COATED ORAL
Qty: 30 TABLET | Refills: 0 | Status: ON HOLD | OUTPATIENT
Start: 2018-04-09 | End: 2018-05-02

## 2018-04-09 NOTE — TELEPHONE ENCOUNTER
LOV: 3/27/18 w/ TB  FOV: None  Last labs: 3/27/18 PT  Last Refill: 1/30/18 qt:30 1 refill    Per protocol sent to provider

## 2018-04-17 ENCOUNTER — TELEPHONE (OUTPATIENT)
Dept: INTERNAL MEDICINE CLINIC | Facility: CLINIC | Age: 83
End: 2018-04-17

## 2018-04-17 NOTE — TELEPHONE ENCOUNTER
Received request for prior authorization from [facility] drug pharmacy, UV:[phone], fax: [phone] [address]#:[id number]  [id number]  PCN:[id number]    Initiate prior auth. or alternative? Please advise.

## 2018-04-17 NOTE — TELEPHONE ENCOUNTER
Patient sees pulmonary Dr. Bk Del Rio.  Can we direct this medication for her lungs to her pulmonologist?

## 2018-04-23 ENCOUNTER — TELEPHONE (OUTPATIENT)
Dept: INTERNAL MEDICINE CLINIC | Facility: CLINIC | Age: 83
End: 2018-04-23

## 2018-04-23 NOTE — TELEPHONE ENCOUNTER
Daughter, Doc Jorge Alberto, states Efra Cantu has itchy eyes. She's been using allergy drops. She states they burn. She uses Claritin as well. This isn't giving her relief.   She wants to know if there is anything else Dr Charis Hunter would suggest?      I offered to make

## 2018-04-24 NOTE — TELEPHONE ENCOUNTER
Called Charla Jimenez advised TB recommendations below for patient, Charla Jimenez will pickup and have patient try and will call back if no relief.

## 2018-04-25 NOTE — TELEPHONE ENCOUNTER
[facility] informed patient should get this refill from pulmonologist. Pharmacist will contact Dr. Adron Najjar office.

## 2018-04-30 ENCOUNTER — APPOINTMENT (OUTPATIENT)
Dept: CT IMAGING | Facility: HOSPITAL | Age: 83
End: 2018-04-30
Attending: EMERGENCY MEDICINE
Payer: MEDICARE

## 2018-04-30 ENCOUNTER — HOSPITAL ENCOUNTER (OUTPATIENT)
Facility: HOSPITAL | Age: 83
Setting detail: OBSERVATION
Discharge: HOME | End: 2018-05-02
Attending: EMERGENCY MEDICINE | Admitting: INTERNAL MEDICINE
Payer: MEDICARE

## 2018-04-30 DIAGNOSIS — E86.0 DEHYDRATION: ICD-10-CM

## 2018-04-30 DIAGNOSIS — R19.7 NAUSEA VOMITING AND DIARRHEA: Primary | ICD-10-CM

## 2018-04-30 DIAGNOSIS — I48.20 CHRONIC ATRIAL FIBRILLATION (HCC): ICD-10-CM

## 2018-04-30 DIAGNOSIS — R11.0 NAUSEA: ICD-10-CM

## 2018-04-30 DIAGNOSIS — R11.2 NAUSEA VOMITING AND DIARRHEA: Primary | ICD-10-CM

## 2018-04-30 PROCEDURE — 74176 CT ABD & PELVIS W/O CONTRAST: CPT | Performed by: EMERGENCY MEDICINE

## 2018-04-30 RX ORDER — SODIUM CHLORIDE 9 MG/ML
125 INJECTION, SOLUTION INTRAVENOUS CONTINUOUS
Status: DISCONTINUED | OUTPATIENT
Start: 2018-04-30 | End: 2018-05-02

## 2018-05-01 PROBLEM — E86.0 DEHYDRATION: Status: ACTIVE | Noted: 2018-05-01

## 2018-05-01 PROCEDURE — 99220 INITIAL OBSERVATION CARE,LEVL III: CPT | Performed by: INTERNAL MEDICINE

## 2018-05-01 RX ORDER — ONDANSETRON 2 MG/ML
4 INJECTION INTRAMUSCULAR; INTRAVENOUS EVERY 4 HOURS PRN
Status: DISCONTINUED | OUTPATIENT
Start: 2018-05-01 | End: 2018-05-02

## 2018-05-01 RX ORDER — DULOXETIN HYDROCHLORIDE 30 MG/1
60 CAPSULE, DELAYED RELEASE ORAL DAILY
Status: DISCONTINUED | OUTPATIENT
Start: 2018-05-01 | End: 2018-05-02

## 2018-05-01 RX ORDER — CHLORTHALIDONE 50 MG/1
12.5 TABLET ORAL
Status: DISCONTINUED | OUTPATIENT
Start: 2018-05-01 | End: 2018-05-02

## 2018-05-01 RX ORDER — HYDROCODONE BITARTRATE AND ACETAMINOPHEN 5; 325 MG/1; MG/1
1 TABLET ORAL EVERY 6 HOURS PRN
Status: DISCONTINUED | OUTPATIENT
Start: 2018-05-01 | End: 2018-05-02

## 2018-05-01 RX ORDER — ATENOLOL 25 MG/1
25 TABLET ORAL
Status: DISCONTINUED | OUTPATIENT
Start: 2018-05-01 | End: 2018-05-02

## 2018-05-01 RX ORDER — SODIUM CHLORIDE 9 MG/ML
INJECTION, SOLUTION INTRAVENOUS CONTINUOUS
Status: DISCONTINUED | OUTPATIENT
Start: 2018-05-01 | End: 2018-05-02

## 2018-05-01 RX ORDER — WARFARIN SODIUM 5 MG/1
5 TABLET ORAL
Status: DISCONTINUED | OUTPATIENT
Start: 2018-05-01 | End: 2018-05-02

## 2018-05-01 RX ORDER — ONDANSETRON 2 MG/ML
4 INJECTION INTRAMUSCULAR; INTRAVENOUS EVERY 6 HOURS PRN
Status: DISCONTINUED | OUTPATIENT
Start: 2018-05-01 | End: 2018-05-02

## 2018-05-01 RX ORDER — FOLIC ACID 1 MG/1
1 TABLET ORAL DAILY
Status: DISCONTINUED | OUTPATIENT
Start: 2018-05-01 | End: 2018-05-02

## 2018-05-01 RX ORDER — SODIUM CHLORIDE 9 MG/ML
INJECTION, SOLUTION INTRAVENOUS CONTINUOUS
Status: ACTIVE | OUTPATIENT
Start: 2018-05-01 | End: 2018-05-01

## 2018-05-01 RX ORDER — WARFARIN SODIUM 2.5 MG/1
2.5 TABLET ORAL
Status: DISCONTINUED | OUTPATIENT
Start: 2018-05-06 | End: 2018-05-02

## 2018-05-01 RX ORDER — ATENOLOL AND CHLORTHALIDONE 50; 25 MG/1; MG/1
0.5 TABLET ORAL DAILY
Status: DISCONTINUED | OUTPATIENT
Start: 2018-05-01 | End: 2018-05-01 | Stop reason: SDUPTHER

## 2018-05-01 RX ORDER — POLYVINYL ALCOHOL 14 MG/ML
1 SOLUTION/ DROPS OPHTHALMIC 4 TIMES DAILY PRN
Status: DISCONTINUED | OUTPATIENT
Start: 2018-05-01 | End: 2018-05-02

## 2018-05-01 RX ORDER — ALBUTEROL SULFATE 90 UG/1
2 AEROSOL, METERED RESPIRATORY (INHALATION) EVERY 6 HOURS PRN
Status: DISCONTINUED | OUTPATIENT
Start: 2018-05-01 | End: 2018-05-02

## 2018-05-01 RX ORDER — ONDANSETRON 2 MG/ML
4 INJECTION INTRAMUSCULAR; INTRAVENOUS ONCE
Status: DISCONTINUED | OUTPATIENT
Start: 2018-05-01 | End: 2018-05-02

## 2018-05-01 RX ORDER — DEXTROSE MONOHYDRATE 25 G/50ML
50 INJECTION, SOLUTION INTRAVENOUS
Status: DISCONTINUED | OUTPATIENT
Start: 2018-05-01 | End: 2018-05-02

## 2018-05-01 NOTE — PLAN OF CARE
Assumed care @ 0731. Patient c/o mild-moderate left lower quadrant. Patient c/o nausea, no emesis noted. No diarrhea noted. Regular diet tolerated well. Norco given as needed, with moderate relief.

## 2018-05-01 NOTE — PLAN OF CARE
NURSING ADMISSION NOTE      Patient admitted via Cart  Oriented to room. Safety precautions initiated. Bed in low position. Call light in reach. Pt alert with short term memory loss. Daughter at bedside assisting with admission navigator.     pt

## 2018-05-01 NOTE — RESPIRATORY THERAPY NOTE
Went to administer and educate patient on incruse and breo dpi. Pt and family states she cannot take powder inhalers.  Pt made aware to talk to RN about using home mdi

## 2018-05-01 NOTE — ED NOTES
PCT 4 M/O WITH REPORT GIVEN TO AND ACCEPTED BY FAUSTINO JOEL. PREPARED FOR TX  PER CART PER TRANSPORT.

## 2018-05-01 NOTE — ED NOTES
RE-DEVIN NELSON MD WITH DISCUSSION OF BOTH LABS/RADIOGRAPHICS. TO BE ADMITTED FOR N/V/D. QUESTIONS ANSWERED PER  OH MD.  IN [place] [place].

## 2018-05-01 NOTE — H&P
[facility]                                                               History & Physical         Allison Jake Patient Status:  Observation    1928 MRN [id number]   [facility] 4NW-A Attending Candy Terrazas MD   [address] Day # right upper nodule of 1.1 cm, right midpole nodule of 3.2 cm and left inferior nodule of 1.6 cm with microcalcifications .  Biopsy of all three benign   • Osteoarthrosis, unspecified whether generalized or localized, unspecified site    • Pacemaker     Pace Comment:Occurs sometimes  Darvon [Bexophene]      Rash  Iv Dye [Radiology C*        Comment:Unsure reaction  Msg [Monosodium Glu*    Other (See Comments)    Comment:Watering eyes and \"acts out\"  Naproxen                Diarrhea  Percocet [Oxycodone*    R Taking   folic acid 1 MG Oral Tab Take 1 mg by mouth daily. Disp:  Rfl:  Not Taking       Review of Systems:  A comprehensive 14 point review of systems was completed. Pertinent positives and negatives noted in the the HPI.     Physical Exam:     Vital sig Radiology) NRDR ([address] [place] [address]) which includes the Dose Index Registry. PATIENT STATED HISTORY: (As transcribed by Technologist)  Patient has abdomen pain and vomiting. History of appendectomy.          FINDINGS:  Please note the ex vomiting. 2. Chronic atrial fibrillation with previous pacemaker–patient on Coumadin at home, follow INR  3. Chronic lymphocytic thyroiditis  4. COPD–continue home inhalers  5. Non-insulin-dependent Diabetes mellitus type 2–hyperglycemia protocol.   Adelfo vu

## 2018-05-01 NOTE — CM/SW NOTE
05/01/18 1100   CM/SW Referral Data   Referral Source Social Work (self-referral)   Reason for Referral Discharge planning   Informant Patient   Patient Info   Patient's Mental Status Alert;Oriented   Patient's Home Environment House   Patient lives wit

## 2018-05-01 NOTE — ED PROVIDER NOTES
Patient Seen in: [facility] Emergency Department    History   Patient presents with:  Nausea/Vomiting/Diarrhea (gastrointestinal)    Stated Complaint:     HPI    22-year-old female with a history of atrial fibrillation, history of breast cancer, his localized, unspecified site    • Pacemaker     Pacemaker placed in 2004 at [address] [place] [address]   • PONV (postoperative nausea and vomiting)    • Right adrenal mass ([facility] [address]     Dx in 2011: right adrenal mass measuring between 1 and 1.5 that seems to be an adenoma and  is and oriented x3. No acute distress. Well-developed and well-nourished. HEENT: Normocephalic, atraumatic. Pupils are equally round and reactive to light. Extraocular movements are intact. Oropharynx is clear. Uvula is midline.   Tympanic membranes are CBC W/ DIFFERENTIAL[[id number]]          Abnormal            Final result                 Please view results for these tests on the individual orders.    LACTIC ACID RFLX DO NOT CANCEL       ED Course as of Apr 30 2325  ---------------------------------- visible mass.  Pelvic organs appropriate for patient age.    BONES:  Stable loss of height of the T11 and T12 vertebral bodies. LUNG BASES:  Stable appearance of the lung bases with areas of scarring and fibrosis.   OTHER:  Negative.        Impression

## 2018-05-02 VITALS
OXYGEN SATURATION: 94 % | BODY MASS INDEX: 23.56 KG/M2 | HEART RATE: 66 BPM | DIASTOLIC BLOOD PRESSURE: 37 MMHG | HEIGHT: 65 IN | TEMPERATURE: 99 F | RESPIRATION RATE: 18 BRPM | WEIGHT: 141.38 LBS | SYSTOLIC BLOOD PRESSURE: 104 MMHG

## 2018-05-02 PROCEDURE — 99217 OBSERVATION CARE DISCHARGE: CPT | Performed by: STUDENT IN AN ORGANIZED HEALTH CARE EDUCATION/TRAINING PROGRAM

## 2018-05-02 RX ORDER — WARFARIN SODIUM 7.5 MG/1
7.5 TABLET ORAL ONCE
Status: COMPLETED | OUTPATIENT
Start: 2018-05-02 | End: 2018-05-02

## 2018-05-02 RX ORDER — ONDANSETRON 4 MG/1
4 TABLET, FILM COATED ORAL EVERY 8 HOURS PRN
Qty: 12 TABLET | Refills: 0 | Status: SHIPPED | OUTPATIENT
Start: 2018-05-02 | End: 2018-05-11

## 2018-05-02 RX ORDER — ATENOLOL 25 MG/1
25 TABLET ORAL ONCE
Status: COMPLETED | OUTPATIENT
Start: 2018-05-02 | End: 2018-05-02

## 2018-05-02 NOTE — PLAN OF CARE
GASTROINTESTINAL - ADULT    • Maintains or returns to baseline bowel function Adequate for Discharge        METABOLIC/FLUID AND ELECTROLYTES - ADULT    • Glucose maintained within prescribed range Adequate for Discharge        Patient/Family Goals    • Jung Ken

## 2018-05-02 NOTE — PLAN OF CARE
PAIN - ADULT    • Verbalizes/displays adequate comfort level or patient's stated pain goal Not Progressing          GASTROINTESTINAL - ADULT    • Minimal or absence of nausea and vomiting Progressing        RESPIRATORY - ADULT    • Achieves optimal ventila

## 2018-05-02 NOTE — PROGRESS NOTES
Pt tolerating PO- INR subtherapeutic spoke with Dtr/pt about this again.    Will need f/u INR- sayas they have a [facility] appointment for his upcoming Friday  D/C home today

## 2018-05-03 ENCOUNTER — TELEPHONE (OUTPATIENT)
Dept: INTERNAL MEDICINE CLINIC | Facility: CLINIC | Age: 83
End: 2018-05-03

## 2018-05-03 ENCOUNTER — PATIENT OUTREACH (OUTPATIENT)
Dept: CASE MANAGEMENT | Age: 83
End: 2018-05-03

## 2018-05-03 DIAGNOSIS — Z02.9 ENCOUNTERS FOR ADMINISTRATIVE PURPOSE: ICD-10-CM

## 2018-05-03 NOTE — TELEPHONE ENCOUNTER
Spoke to pt's dtr per HIPPA for TCM today. Dtr requesting refill for Atenolol-Chlorthalidone. Pharmacist states refills are available on current script, however, this med requires a prior authorization.   Cheryl from [facility] states she will fax the prior auth

## 2018-05-03 NOTE — CM/SW NOTE
05/03/18 0900   Discharge disposition   Expected discharge disposition Home or Self   Discharge transportation Private car

## 2018-05-03 NOTE — DISCHARGE SUMMARY
[facility] HOSPITAL[facility]  DISCHARGE SUMMARY     Dario Jeffery Patient Status:  Observation    1928 MRN [id number]   [facility] 4NW-A Attending No att. providers found   Hosp Day # 0 PCP Scott Abrams MD     Date of Admission: 2018  D her PCP very much). She was given 7.5 mg coumadin x1 on 5/2, and then to resume regular regimen at home- repeat INR on 5/4. Instructed to hold home dose coumadin on 5/2/2018.     Procedures during hospitalization:   • CT A/P  =====  CONCLUSION:  No acute ab Hydrocodone-Acetaminophen 5-300 MG Tabs      Take 1 tablet by mouth every 8 (eight) hours as needed.    Quantity:  90 tablet  Refills:  0     SYMBICORT 160-4.5 MCG/ACT Aero  Generic drug:  Budesonide-Formoterol Fumarate      Inhale 2 puffs into the lungs

## 2018-05-03 NOTE — PROGRESS NOTES
Initial Post Discharge Follow Up   Discharge Date: 5/2/18  Contact Date: 5/3/2018    Consent Verification:  Assessment Completed With: Patient  Caregiver: Soledad stiles Permission received per patient?  written  HIPAA Verified?   Yes    Discharge Dx:  Nausea, one tablet daily with breakfast) Disp: 180 tablet Rfl: 0   folic acid 1 MG Oral Tab Take 1 mg by mouth daily. Disp:  Rfl:    Budesonide-Formoterol Fumarate (SYMBICORT) 160-4.5 MCG/ACT Inhalation Aerosol Inhale 2 puffs into the lungs 2 (two) times daily.  Jonathan Maldonado Applicable     Overall Rating: How would you rate the care you received while in the hospital?  good     Interventions by NCM:  Dtr refused to answer all TCM questions stating, Salma Ashwini is doing fine since we came home.   She's very tired from the hospital s

## 2018-05-04 ENCOUNTER — TELEPHONE (OUTPATIENT)
Dept: INTERNAL MEDICINE CLINIC | Facility: CLINIC | Age: 83
End: 2018-05-04

## 2018-05-04 ENCOUNTER — MYAURORA ACCOUNT LINK (OUTPATIENT)
Dept: OTHER | Age: 83
End: 2018-05-04

## 2018-05-04 ENCOUNTER — PRIOR ORIGINAL RECORDS (OUTPATIENT)
Dept: OTHER | Age: 83
End: 2018-05-04

## 2018-05-04 RX ORDER — ATENOLOL 50 MG/1
25 TABLET ORAL DAILY
Qty: 45 TABLET | Refills: 1 | Status: SHIPPED | OUTPATIENT
Start: 2018-05-04 | End: 2018-08-02

## 2018-05-05 ENCOUNTER — MED REC SCAN ONLY (OUTPATIENT)
Dept: INTERNAL MEDICINE CLINIC | Facility: CLINIC | Age: 83
End: 2018-05-05

## 2018-05-05 RX ORDER — IPRATROPIUM/ALBUTEROL SULFATE 20-100 MCG
MIST INHALER (GRAM) INHALATION
Qty: 4 G | Refills: 1 | Status: SHIPPED | OUTPATIENT
Start: 2018-05-05 | End: 2018-07-11

## 2018-05-05 NOTE — TELEPHONE ENCOUNTER
LOV: 03/27/2018  Future Visit: 05/09/2018   Last Labs: 03/27/2018 PT  Last Rx: 05/01/2017      Per protocol: Asthma Action Score greater than or equal to 20

## 2018-05-05 NOTE — TELEPHONE ENCOUNTER
Patient's BP running on the low side anyway, let us just change to plain atenolol,  I will send to pharmacy, let pt know

## 2018-05-07 NOTE — TELEPHONE ENCOUNTER
Spoke with patient's daughter Tabitha Cassidy per HIPAA-informed Medicare did not authorize for combination Atenolol-Chlorthalidone, Atenolol rx sent to [address] [place] on file. Anni Dexter verbalized understanding and agreeable to [address].

## 2018-05-10 DIAGNOSIS — R11.0 NAUSEA: ICD-10-CM

## 2018-05-10 RX ORDER — ONDANSETRON 4 MG/1
TABLET, FILM COATED ORAL
Qty: 30 TABLET | Refills: 0 | Status: SHIPPED | OUTPATIENT
Start: 2018-05-10 | End: 2018-05-11

## 2018-05-10 NOTE — TELEPHONE ENCOUNTER
Last Office Visit: 3-27-18 with TB for hospital  Last Rx Filled: 5-2-18 12 tabs no refills   Last Labs: 5-1-18 cbc/magnesium/BMP/hga1c  Future Appointment: 5-11-18    Per protocol to provider

## 2018-05-11 ENCOUNTER — APPOINTMENT (OUTPATIENT)
Dept: LAB | Age: 83
End: 2018-05-11
Attending: INTERNAL MEDICINE
Payer: MEDICARE

## 2018-05-11 ENCOUNTER — OFFICE VISIT (OUTPATIENT)
Dept: INTERNAL MEDICINE CLINIC | Facility: CLINIC | Age: 83
End: 2018-05-11

## 2018-05-11 VITALS
WEIGHT: 133.13 LBS | HEIGHT: 65 IN | HEART RATE: 80 BPM | DIASTOLIC BLOOD PRESSURE: 64 MMHG | SYSTOLIC BLOOD PRESSURE: 114 MMHG | BODY MASS INDEX: 22.18 KG/M2 | RESPIRATION RATE: 18 BRPM | TEMPERATURE: 98 F

## 2018-05-11 DIAGNOSIS — F11.20 CHRONIC NARCOTIC DEPENDENCE (HCC): ICD-10-CM

## 2018-05-11 DIAGNOSIS — I48.20 CHRONIC ATRIAL FIBRILLATION (HCC): ICD-10-CM

## 2018-05-11 DIAGNOSIS — K52.9 GASTROENTERITIS: Primary | ICD-10-CM

## 2018-05-11 DIAGNOSIS — R11.0 NAUSEA: ICD-10-CM

## 2018-05-11 DIAGNOSIS — E11.40 NEUROPATHY DUE TO TYPE 2 DIABETES MELLITUS (HCC): ICD-10-CM

## 2018-05-11 PROCEDURE — 85610 PROTHROMBIN TIME: CPT

## 2018-05-11 PROCEDURE — 99214 OFFICE O/P EST MOD 30 MIN: CPT | Performed by: INTERNAL MEDICINE

## 2018-05-11 PROCEDURE — 1111F DSCHRG MED/CURRENT MED MERGE: CPT | Performed by: INTERNAL MEDICINE

## 2018-05-11 RX ORDER — ONDANSETRON 4 MG/1
4 TABLET, FILM COATED ORAL EVERY 8 HOURS PRN
Qty: 30 TABLET | Refills: 1 | Status: SHIPPED | OUTPATIENT
Start: 2018-05-11 | End: 2018-08-06

## 2018-05-11 RX ORDER — HYDROCODONE BITARTRATE AND ACETAMINOPHEN 5; 300 MG/1; MG/1
1 TABLET ORAL EVERY 8 HOURS PRN
Qty: 90 TABLET | Refills: 0 | Status: SHIPPED | OUTPATIENT
Start: 2018-05-11 | End: 2018-06-08

## 2018-05-11 NOTE — PROGRESS NOTES
Chloe Brito is a 80year old female. Patient presents with:  Hospital F/U: Pt. is here for follow up from Hospital visit 4/30/18 due to nausea and vomiting      HPI:     Patient admitted 4/30- 5/2 for nausea, vomiting and diarrhea.  Felt to be related hypertension     Lung nodule     Right adrenal mass (HCC)     Compression fracture of thoracic vertebra (HCC)     Hip pain     Adenoma of right adrenal gland     Acute bronchitis     Benign essential HTN     Diabetes mellitus type 2 in nonobese ([address]     A mouth daily.  Disp: 90 capsule Rfl: 3   Warfarin Sodium 5 MG Oral Tab 1 tab daily (Patient taking differently: 1 tab daily ) Disp: 90 tablet Rfl: 3   METFORMIN HCL 1000 MG Oral Tab TAKE ONE TABLET BY MOUTH TWO TIMES A DAY WITH MEALS (Patient taking differen Dx in 2011: right adrenal mass measuring between 1 and 1.5 that seems to be an adenoma and  is stable on CT scans   • Subclinical hyperthyroidism     Dx in 6/2013 - due to multinodular goiter   • Type II or unspecified type diabetes mellitus without me supple,no adenopathy  LUNGS: normal rate without respiratory distress, lungs clear to auscultation  CARDIO: irregularly irregular, nl S1 S2  GI: normal bowel sounds, no masses, HSM or tenderness  EXTREMITIES: no cyanosis, clubbing or edema, normal strength

## 2018-05-25 ENCOUNTER — TELEPHONE (OUTPATIENT)
Dept: INTERNAL MEDICINE CLINIC | Facility: CLINIC | Age: 83
End: 2018-05-25

## 2018-05-25 NOTE — TELEPHONE ENCOUNTER
Spoke with patient's daughter Albaro per HIPAA-informed per CB patient may need to try enema for constipation but should avoid constipation medications with magnesium as she has ckd. Re-enforced the need of fiber and water regularly.  Patient scheduled wi

## 2018-05-25 NOTE — TELEPHONE ENCOUNTER
Pt's Daughter Vinod Royal calling to say Pt has been experiencing rectal bleeding for several weeks, bright red.

## 2018-05-25 NOTE — TELEPHONE ENCOUNTER
Sounds like she may need to try enema for constipation. Re-enforce fiber and water regularly. Yes, she could schedule f/u visit with TB next week for constipation as this is a different issue than what she was referred to GI for.  She should avoid constipa

## 2018-05-25 NOTE — TELEPHONE ENCOUNTER
To provider on Call-CB  Spoke with patient's daughter-Aren per HIPAA, stating pt. Has continues to have constipation, she currently takes hydrocodone for neuropathy, does not drink plenty of water.  A couple weeks ago Noticed bright red blood, when fina

## 2018-06-08 DIAGNOSIS — E11.40 NEUROPATHY DUE TO TYPE 2 DIABETES MELLITUS (HCC): ICD-10-CM

## 2018-06-08 DIAGNOSIS — F11.20 CHRONIC NARCOTIC DEPENDENCE (HCC): ICD-10-CM

## 2018-06-08 NOTE — TELEPHONE ENCOUNTER
LOV: 5/11/18 TB  Future office visit: No upcoming visit   Last labs: 5/1/18 Cbc Bmp   Last RX: 5/11/18 #90 No Refills  Per protocol: Route to provider

## 2018-06-08 NOTE — TELEPHONE ENCOUNTER
Patient's daughter, Maria Isabel Sanchez, requests a refill of the following:    Hydrocondone-Acetaminophen      Please advise.

## 2018-06-11 RX ORDER — HYDROCODONE BITARTRATE AND ACETAMINOPHEN 5; 300 MG/1; MG/1
1 TABLET ORAL EVERY 8 HOURS PRN
Qty: 90 TABLET | Refills: 0 | Status: SHIPPED | OUTPATIENT
Start: 2018-06-11 | End: 2018-07-05

## 2018-06-14 NOTE — TELEPHONE ENCOUNTER
Son Charmaine Trotter picked up script. I called spoke to Vinod Cancino to clarify that her brother was here to  rx. She stated that is fine she doesn't have a transportation to come and get it ok to give to him.

## 2018-07-02 ENCOUNTER — TELEPHONE (OUTPATIENT)
Dept: INTERNAL MEDICINE CLINIC | Facility: CLINIC | Age: 83
End: 2018-07-02

## 2018-07-02 NOTE — TELEPHONE ENCOUNTER
No nsaids due to being on coumadin  Would benefit from seeing ortho for injections, I usually use Dr. Olivia Rojas but she can see another ortho if she likes

## 2018-07-02 NOTE — TELEPHONE ENCOUNTER
Spoke with patient's daughter-ok per HIPAA- informed no nsaids due to being on coumadin, would benefit from seeing ortho for injections, patient referred to Jackson Chavez, otherwise she can see another ortho if she likes.  Daughter provided with office contact infor

## 2018-07-02 NOTE — TELEPHONE ENCOUNTER
Pt has arthritis and she is having a difficult time walking. Both of her knees are painful. Her daughter is unable to bring her she doesn't drive and pt is not able to walk. Would like to know if we would give an antiinflammatory? I offered an appt.  She de

## 2018-07-02 NOTE — TELEPHONE ENCOUNTER
Called daughter Marsha Tapia back - ok per MOI - who reports pt has had issues with her knees during most every summer. For the last 2 weeks, pt has had kneed pain so bad she can barely walk.   Tried analgesic cream and pain pills - takes Norco 5 mg (taken most

## 2018-07-05 DIAGNOSIS — E11.40 NEUROPATHY DUE TO TYPE 2 DIABETES MELLITUS (HCC): ICD-10-CM

## 2018-07-05 DIAGNOSIS — F11.20 CHRONIC NARCOTIC DEPENDENCE (HCC): ICD-10-CM

## 2018-07-05 NOTE — TELEPHONE ENCOUNTER
LOV: 5/11/18 w/ TB  FOV: None  Last labs: 5/11/18 PT  Last Refill: 6/11/18 qt:90    Per protocol sent to provider  Script was future dated to 7/14/18

## 2018-07-05 NOTE — TELEPHONE ENCOUNTER
Re [facility]. ... Pt's Daughter Lida Lemons, on HIPPA, would like to  script on July 6, 2018, Daughter has appt with SC on 7-6 at and would like to  at the same time. She has no other transportation. Pt has a few pills left.  She is due of the refi

## 2018-07-06 RX ORDER — HYDROCODONE BITARTRATE AND ACETAMINOPHEN 5; 300 MG/1; MG/1
1 TABLET ORAL EVERY 8 HOURS PRN
Qty: 90 TABLET | Refills: 0 | Status: ON HOLD | OUTPATIENT
Start: 2018-07-14 | End: 2018-07-12

## 2018-07-11 ENCOUNTER — APPOINTMENT (OUTPATIENT)
Dept: CT IMAGING | Facility: HOSPITAL | Age: 83
End: 2018-07-11
Attending: EMERGENCY MEDICINE
Payer: MEDICARE

## 2018-07-11 ENCOUNTER — HOSPITAL ENCOUNTER (OUTPATIENT)
Facility: HOSPITAL | Age: 83
Setting detail: OBSERVATION
Discharge: HOME HEALTH | End: 2018-07-12
Attending: EMERGENCY MEDICINE | Admitting: HOSPITALIST
Payer: MEDICARE

## 2018-07-11 DIAGNOSIS — M54.59 INTRACTABLE LOW BACK PAIN: Primary | ICD-10-CM

## 2018-07-11 DIAGNOSIS — F11.20 CHRONIC NARCOTIC DEPENDENCE (HCC): ICD-10-CM

## 2018-07-11 DIAGNOSIS — E11.40 NEUROPATHY DUE TO TYPE 2 DIABETES MELLITUS (HCC): ICD-10-CM

## 2018-07-11 PROBLEM — R73.9 HYPERGLYCEMIA: Status: ACTIVE | Noted: 2018-07-11

## 2018-07-11 PROBLEM — D64.9 ANEMIA: Status: ACTIVE | Noted: 2018-07-11

## 2018-07-11 PROBLEM — R79.89 AZOTEMIA: Status: ACTIVE | Noted: 2018-07-11

## 2018-07-11 LAB
ALBUMIN SERPL-MCNC: 3.4 G/DL (ref 3.5–4.8)
ALP LIVER SERPL-CCNC: 78 U/L (ref 55–142)
ALT SERPL-CCNC: 19 U/L (ref 14–54)
APTT PPP: 53.6 SECONDS (ref 26.1–34.6)
AST SERPL-CCNC: 19 U/L (ref 15–41)
BASOPHILS # BLD AUTO: 0.04 X10(3) UL (ref 0–0.1)
BASOPHILS NFR BLD AUTO: 0.4 %
BILIRUB SERPL-MCNC: 0.8 MG/DL (ref 0.1–2)
BUN BLD-MCNC: 28 MG/DL (ref 8–20)
CALCIUM BLD-MCNC: 8.7 MG/DL (ref 8.3–10.3)
CHLORIDE: 102 MMOL/L (ref 101–111)
CO2: 26 MMOL/L (ref 22–32)
CREAT BLD-MCNC: 1.22 MG/DL (ref 0.55–1.02)
EOSINOPHIL # BLD AUTO: 0.32 X10(3) UL (ref 0–0.3)
EOSINOPHIL NFR BLD AUTO: 3.3 %
ERYTHROCYTE [DISTWIDTH] IN BLOOD BY AUTOMATED COUNT: 14.9 % (ref 11.5–16)
GLUCOSE BLD-MCNC: 134 MG/DL (ref 65–99)
GLUCOSE BLD-MCNC: 141 MG/DL (ref 70–99)
GLUCOSE BLD-MCNC: 192 MG/DL (ref 65–99)
HCT VFR BLD AUTO: 36.3 % (ref 34–50)
HGB BLD-MCNC: 11.7 G/DL (ref 12–16)
IMMATURE GRANULOCYTE COUNT: 0.06 X10(3) UL (ref 0–1)
IMMATURE GRANULOCYTE RATIO %: 0.6 %
INR BLD: 2.17 (ref 0.9–1.1)
LIPASE: 157 U/L (ref 73–393)
LYMPHOCYTES # BLD AUTO: 1.22 X10(3) UL (ref 0.9–4)
LYMPHOCYTES NFR BLD AUTO: 12.8 %
M PROTEIN MFR SERPL ELPH: 6.7 G/DL (ref 6.1–8.3)
MCH RBC QN AUTO: 27.3 PG (ref 27–33.2)
MCHC RBC AUTO-ENTMCNC: 32.2 G/DL (ref 31–37)
MCV RBC AUTO: 84.8 FL (ref 81–100)
MONOCYTES # BLD AUTO: 0.58 X10(3) UL (ref 0.1–1)
MONOCYTES NFR BLD AUTO: 6.1 %
NEUTROPHIL ABS PRELIM: 7.34 X10 (3) UL (ref 1.3–6.7)
NEUTROPHILS # BLD AUTO: 7.34 X10(3) UL (ref 1.3–6.7)
NEUTROPHILS NFR BLD AUTO: 76.8 %
PLATELET # BLD AUTO: 157 10(3)UL (ref 150–450)
POTASSIUM SERPL-SCNC: 4.2 MMOL/L (ref 3.6–5.1)
PSA SERPL DL<=0.01 NG/ML-MCNC: 24.9 SECONDS (ref 12.4–14.7)
RBC # BLD AUTO: 4.28 X10(6)UL (ref 3.8–5.1)
RED CELL DISTRIBUTION WIDTH-SD: 45.4 FL (ref 35.1–46.3)
SODIUM SERPL-SCNC: 137 MMOL/L (ref 136–144)
WBC # BLD AUTO: 9.6 X10(3) UL (ref 4–13)

## 2018-07-11 PROCEDURE — 74176 CT ABD & PELVIS W/O CONTRAST: CPT | Performed by: EMERGENCY MEDICINE

## 2018-07-11 PROCEDURE — 70450 CT HEAD/BRAIN W/O DYE: CPT | Performed by: EMERGENCY MEDICINE

## 2018-07-11 PROCEDURE — 99220 INITIAL OBSERVATION CARE,LEVL III: CPT | Performed by: HOSPITALIST

## 2018-07-11 RX ORDER — ACETAMINOPHEN 325 MG/1
650 TABLET ORAL EVERY 6 HOURS PRN
Status: DISCONTINUED | OUTPATIENT
Start: 2018-07-11 | End: 2018-07-12

## 2018-07-11 RX ORDER — ACETAMINOPHEN 500 MG
1000 TABLET ORAL ONCE
Status: COMPLETED | OUTPATIENT
Start: 2018-07-11 | End: 2018-07-11

## 2018-07-11 RX ORDER — WARFARIN SODIUM 5 MG/1
5 TABLET ORAL SEE ADMIN INSTRUCTIONS
Status: ON HOLD | COMMUNITY
End: 2018-11-02

## 2018-07-11 RX ORDER — METOCLOPRAMIDE HYDROCHLORIDE 5 MG/ML
10 INJECTION INTRAMUSCULAR; INTRAVENOUS EVERY 8 HOURS PRN
Status: DISCONTINUED | OUTPATIENT
Start: 2018-07-11 | End: 2018-07-12

## 2018-07-11 RX ORDER — WARFARIN SODIUM 5 MG/1
2.5 TABLET ORAL SEE ADMIN INSTRUCTIONS
Status: ON HOLD | COMMUNITY
End: 2018-11-02

## 2018-07-11 RX ORDER — WARFARIN SODIUM 2.5 MG/1
2.5 TABLET ORAL
Status: DISCONTINUED | OUTPATIENT
Start: 2018-07-15 | End: 2018-07-12

## 2018-07-11 RX ORDER — MORPHINE SULFATE 4 MG/ML
2 INJECTION, SOLUTION INTRAMUSCULAR; INTRAVENOUS EVERY 4 HOURS PRN
Status: DISCONTINUED | OUTPATIENT
Start: 2018-07-11 | End: 2018-07-12

## 2018-07-11 RX ORDER — ONDANSETRON 2 MG/ML
4 INJECTION INTRAMUSCULAR; INTRAVENOUS EVERY 6 HOURS PRN
Status: DISCONTINUED | OUTPATIENT
Start: 2018-07-11 | End: 2018-07-12

## 2018-07-11 RX ORDER — HYDROCODONE BITARTRATE AND ACETAMINOPHEN 5; 325 MG/1; MG/1
1 TABLET ORAL EVERY 6 HOURS PRN
Status: DISCONTINUED | OUTPATIENT
Start: 2018-07-11 | End: 2018-07-12

## 2018-07-11 RX ORDER — ONDANSETRON 2 MG/ML
4 INJECTION INTRAMUSCULAR; INTRAVENOUS EVERY 4 HOURS PRN
Status: DISCONTINUED | OUTPATIENT
Start: 2018-07-11 | End: 2018-07-11 | Stop reason: SDUPTHER

## 2018-07-11 RX ORDER — SODIUM CHLORIDE 9 MG/ML
INJECTION, SOLUTION INTRAVENOUS CONTINUOUS
Status: DISCONTINUED | OUTPATIENT
Start: 2018-07-11 | End: 2018-07-11

## 2018-07-11 RX ORDER — ATENOLOL 25 MG/1
25 TABLET ORAL
Status: DISCONTINUED | OUTPATIENT
Start: 2018-07-11 | End: 2018-07-12

## 2018-07-11 RX ORDER — MORPHINE SULFATE 4 MG/ML
1 INJECTION, SOLUTION INTRAMUSCULAR; INTRAVENOUS ONCE
Status: DISCONTINUED | OUTPATIENT
Start: 2018-07-11 | End: 2018-07-11

## 2018-07-11 RX ORDER — DEXTROSE MONOHYDRATE 25 G/50ML
50 INJECTION, SOLUTION INTRAVENOUS
Status: DISCONTINUED | OUTPATIENT
Start: 2018-07-11 | End: 2018-07-12

## 2018-07-11 RX ORDER — MORPHINE SULFATE 4 MG/ML
0.5 INJECTION, SOLUTION INTRAMUSCULAR; INTRAVENOUS ONCE
Status: COMPLETED | OUTPATIENT
Start: 2018-07-11 | End: 2018-07-11

## 2018-07-11 RX ORDER — WARFARIN SODIUM 5 MG/1
5 TABLET ORAL
Status: DISCONTINUED | OUTPATIENT
Start: 2018-07-11 | End: 2018-07-12

## 2018-07-11 RX ORDER — SODIUM CHLORIDE 9 MG/ML
INJECTION, SOLUTION INTRAVENOUS CONTINUOUS
Status: DISCONTINUED | OUTPATIENT
Start: 2018-07-11 | End: 2018-07-12

## 2018-07-11 RX ORDER — DULOXETIN HYDROCHLORIDE 60 MG/1
60 CAPSULE, DELAYED RELEASE ORAL DAILY
Status: DISCONTINUED | OUTPATIENT
Start: 2018-07-11 | End: 2018-07-12

## 2018-07-11 RX ORDER — ONDANSETRON 2 MG/ML
4 INJECTION INTRAMUSCULAR; INTRAVENOUS ONCE
Status: COMPLETED | OUTPATIENT
Start: 2018-07-11 | End: 2018-07-11

## 2018-07-11 NOTE — ED NOTES
Report received from [facility]. Care was assumed. Pt out of department at this time.

## 2018-07-11 NOTE — ED INITIAL ASSESSMENT (HPI)
Pt to ED from home per EMS with c/o lower back and buttocks pain after losing balance and falling yesterday evening. Pt given fentanyl per EMS en route. Denies hitting head. Takes warfarin.

## 2018-07-11 NOTE — ED PROVIDER NOTES
Patient Seen in: [facility] Emergency Department    History   Patient presents with:  Fall (musculoskeletal, neurologic)  Back Pain (musculoskeletal)    Stated Complaint: back pain after fall    HPI    Patient is 17-year-old female who presents susana cardiac pacemaker    • HIGH BLOOD PRESSURE    • Hypertension    • Lung cancer ([facility] [address]    • Lung nodule     CT scan of chest from 8/2013 - Right lung upper lobe mass measuring 1.2 cm   • Macular degeneration     Receiving injections   • Neuropathy     legs lo Systems    Positive for stated complaint: back pain after fall  Other systems are as noted in HPI. Constitutional and vital signs reviewed. All other systems reviewed and negative except as noted above.     Physical Exam   ED Triage Vitals [07/11/18 1 Value    Glucose 141 (*)     BUN 28 (*)     Creatinine 1.22 (*)     GFR, Non- 39 (*)     GFR, -American 45 (*)     Albumin 3.4 (*)     All other components within normal limits   PROTHROMBIN TIME (PT) - Abnormal; Notable for the foll Senthil Kaur MD on 7/11/2018 at 13:39     Approved by: Tameka Cassidy MD              Patient underwent CT findings as noted above. Patient does have a history of being on Coumadin and did fall injuring her low back and has increasing pain today.   There is no e

## 2018-07-11 NOTE — ED NOTES
Pt was asking to take a Norco, discussed with MD. Pt cannot take a Norco due to recent use of tylenol. Pt was updated with plan of care. MD will discuss the CT results as soon as they become available.

## 2018-07-11 NOTE — H&P
[facility] HOSPITALIST  History and Physical     Vitazach James Patient Status:  Observation    1928 MRN [id number]   [facility] 3NE-A Attending Sarthak Suárez MD   Hosp Day # 0 PCP Lo Brooks MD     Chief Complaint: [facility] and adjustment of cardiac pacemaker    • HIGH BLOOD PRESSURE    • Hypertension    • Lung cancer ([facility] [address]    • Lung nodule     CT scan of chest from 8/2013 - Right lung upper lobe mass measuring 1.2 cm   • Macular degeneration     Receiving injections   • Neur Family History   Problem Relation Age of Onset   • Thyroid Disorder Daughter      Thyroid cancer - thyroidectomy but no MILES   • Cancer Daughter      Thyroid cancer - tx with thyroidectomy but no MILES   • Thyroid Disorder Other      Niece with benign thyro No carotid bruits. Respiratory: Clear to auscultation bilaterally. No wheezes. No rhonchi. Cardiovascular: S1, S2. Regular rate and rhythm. No murmurs, rubs or gallops. Equal pulses. Chest and Back: No tenderness or deformity.   Abdomen: Soft, nontender

## 2018-07-12 VITALS
BODY MASS INDEX: 22 KG/M2 | RESPIRATION RATE: 20 BRPM | WEIGHT: 133.5 LBS | OXYGEN SATURATION: 97 % | HEART RATE: 63 BPM | DIASTOLIC BLOOD PRESSURE: 54 MMHG | SYSTOLIC BLOOD PRESSURE: 106 MMHG | TEMPERATURE: 98 F

## 2018-07-12 LAB
GLUCOSE BLD-MCNC: 127 MG/DL (ref 65–99)
INR BLD: 3.28 (ref 0.9–1.1)
PSA SERPL DL<=0.01 NG/ML-MCNC: 32.9 SECONDS (ref 12–14.1)

## 2018-07-12 PROCEDURE — 99217 OBSERVATION CARE DISCHARGE: CPT | Performed by: HOSPITALIST

## 2018-07-12 RX ORDER — HYDROCODONE BITARTRATE AND ACETAMINOPHEN 5; 300 MG/1; MG/1
1 TABLET ORAL EVERY 8 HOURS PRN
Qty: 20 TABLET | Refills: 0 | Status: ON HOLD | OUTPATIENT
Start: 2018-07-14 | End: 2018-07-26

## 2018-07-12 RX ORDER — ALBUTEROL SULFATE 2.5 MG/3ML
2.5 SOLUTION RESPIRATORY (INHALATION) EVERY 4 HOURS PRN
Status: DISCONTINUED | OUTPATIENT
Start: 2018-07-12 | End: 2018-07-12

## 2018-07-12 NOTE — PROGRESS NOTES
Patient seen and examined    S- pain controlled  General- Nad  Chest- diminished bilaterally  CVS- RRR  Abdo- soft, NT, BS+    Plan   [facility]  Hold coumadin tonight resume in am   INR tomorrow-  Results to PCP    Audrey Cooley M.D.   E

## 2018-07-12 NOTE — CM/SW NOTE
07/12/18 1400   CM/SW Referral Data   Referral Source Physician;Social Work (self-referral)   Reason for Referral Discharge planning   Informant Patient; Children   Patient Info   Patient's Mental Status Alert;Memory Impairments   Patient lives with Chil

## 2018-07-12 NOTE — PLAN OF CARE
Patient requesting nebulizer treatment. Dyspneic at rest and bilateral wheezes. Paged MD London Stewart. Will continue to monitor and await new orders.

## 2018-07-12 NOTE — PHYSICAL THERAPY NOTE
PHYSICAL THERAPY QUICK EVALUATION - INPATIENT    Room Number: 7886/9205-W  Evaluation Date: 7/12/2018  Presenting Problem: fall with back pain   Physician Order: PT Eval and Treat    Problem List  Principal Problem:    Intractable low back pain  Active P multinodular goiter   • Type II or unspecified type diabetes mellitus without mention of complication, not stated as uncontrolled     Type 2 DM - dx at age 76   • Visual impairment        Past Surgical History  Past Surgical History:  No date: APPENDECTOMY and standing up from a chair with arms (e.g., wheelchair, bedside commode, etc.): None   -   Moving from lying on back to sitting on the side of the bed?: None   How much help from another person does the patient currently need. ..   -   Moving to and from following goals . ..     Patient was able to transfer At previous, functional level   Patient able to ambulate on level surfaces At previous, functional level

## 2018-07-12 NOTE — PLAN OF CARE
Assumed care at [address] pt c/o back and abdominal pain. Very anxious, Norco and nausea med just given. Poor appetite,didnt eat dinner. Informed PCP  Ordered IVF, morphine and lipase draw. Pt able to sleep after Morphine.   Daughter remains at the bed

## 2018-07-12 NOTE — PLAN OF CARE
NURSING DISCHARGE NOTE    Discharged Home via Wheelchair. Accompanied by Support staff  Belongings Taken by patient/family.     Discharge papers given and explained to patient and family  All belongings taken

## 2018-07-13 ENCOUNTER — TELEPHONE (OUTPATIENT)
Dept: INTERNAL MEDICINE CLINIC | Facility: CLINIC | Age: 83
End: 2018-07-13

## 2018-07-13 ENCOUNTER — PATIENT OUTREACH (OUTPATIENT)
Dept: CASE MANAGEMENT | Age: 83
End: 2018-07-13

## 2018-07-13 DIAGNOSIS — Z91.81 AT RISK FOR FALLING: ICD-10-CM

## 2018-07-13 DIAGNOSIS — M54.59 INTRACTABLE LOW BACK PAIN: ICD-10-CM

## 2018-07-13 NOTE — TELEPHONE ENCOUNTER
Patient discharged home from [facility] on 7/12/18 and is at High risk of readmission and recommended for a TCM HFU appointment by 7/19/18 no later than 7/26/18.  ROSALIO attempted to schedule TCM HFU patient daughter Nicholas Murillo states she will call to schedule

## 2018-07-13 NOTE — PROGRESS NOTES
Initial Post Discharge Follow Up   Discharge Date: 7/12/18  Contact Date: 7/13/2018    Consent Verification:  Assessment Completed With: Other: Antonia Sanchez patient's daughter Permission received per patient?  written  HIPAA Verified?   Yes    Discharge Dx: well   o Very well  • When you were leaving the hospital were your discharge instructions explained to you? yes  • How well were your discharge instructions explained to you?   o On a scale of 1-5   1- Very Poor and 5- Very well   o Very well  • Do you hav daily. Disp: 90 capsule Rfl: 3   Budesonide-Formoterol Fumarate (SYMBICORT) 160-4.5 MCG/ACT Inhalation Aerosol Inhale 2 puffs into the lungs 2 (two) times daily.  Disp:  Rfl:      • When you were leaving the hospital were any medication changes discussed wi no    Pets        no    Extreme heat yes             Needs post D/C:   Now that you are home, are there any needs or concerns you need addressed before your next visit with your PCP?  (DME, meds, disease concerns, Etc): No     Follow up appointments:

## 2018-07-13 NOTE — DISCHARGE SUMMARY
[facility] HOSPITALIST  DISCHARGE SUMMARY     Marlene Torres Patient Status:  Observation    1928 MRN [id number]   [facility] 3NE-A Attending No att. providers found   Hosp Day # 0 PCP Atif Muñoz MD     Date of Admission: 2018  D infection. She states that she just lost balance and fell. She had low back pain and was treated with Norco.  She had a CT which is negative for any fracture.   She has history of COPD and chronic respiratory failure and was continued on inhalers, nebs an times daily. Refills:  0     Warfarin Sodium 5 MG Tabs  Commonly known as:  COUMADIN      Take 5 mg by mouth See Admin Instructions.  Take 5 mg on Monday, Tuesday, Wednesday, Thursday, Friday, Saturday   Refills:  0     Warfarin Sodium 5 MG Tabs  Commonly

## 2018-07-16 ENCOUNTER — TELEPHONE (OUTPATIENT)
Dept: INTERNAL MEDICINE CLINIC | Facility: CLINIC | Age: 83
End: 2018-07-16

## 2018-07-16 NOTE — TELEPHONE ENCOUNTER
[facility] called to let us know that the patient has refused all home care services from [facility]

## 2018-07-17 NOTE — TELEPHONE ENCOUNTER
Dtr Jimartis Mati states she's unable to bring pt in for TCM visit, she doesn't drive and they have no car, low back is still so painful especially when getting up or down and turning over in bed, she does get some pain relief with Hydrocodone script, takes 3-4 ti

## 2018-07-17 NOTE — TELEPHONE ENCOUNTER
If pain is that severe, she should be in the hospital for pain control. I am  afraid to give her any more pain meds for fear of overdose and side effects. She is only suppose to be taking 3/day of norco  Cannot do nsaids due to coumadin.  Heat packs and ma

## 2018-07-17 NOTE — TELEPHONE ENCOUNTER
Patient's dtr Shaye notified TB recommendations. Shaye notified TB would recommend going back to the hospital for pain control, [address] should only be 3 a day, cannot take NSAIDs because of Coumadin, can use heat packs and possibly otc Salonpas patches, Home

## 2018-07-24 ENCOUNTER — HOSPITAL ENCOUNTER (OUTPATIENT)
Facility: HOSPITAL | Age: 83
Setting detail: OBSERVATION
Discharge: HOME HEALTH | End: 2018-07-27
Attending: EMERGENCY MEDICINE | Admitting: HOSPITALIST
Payer: MEDICARE

## 2018-07-24 ENCOUNTER — TELEPHONE (OUTPATIENT)
Dept: INTERNAL MEDICINE CLINIC | Facility: CLINIC | Age: 83
End: 2018-07-24

## 2018-07-24 ENCOUNTER — APPOINTMENT (OUTPATIENT)
Dept: GENERAL RADIOLOGY | Facility: HOSPITAL | Age: 83
End: 2018-07-24
Attending: EMERGENCY MEDICINE
Payer: MEDICARE

## 2018-07-24 DIAGNOSIS — F11.20 CHRONIC NARCOTIC DEPENDENCE (HCC): ICD-10-CM

## 2018-07-24 DIAGNOSIS — D64.9 ANEMIA, UNSPECIFIED TYPE: ICD-10-CM

## 2018-07-24 DIAGNOSIS — M54.59 INTRACTABLE LOW BACK PAIN: ICD-10-CM

## 2018-07-24 DIAGNOSIS — R79.1 SUBTHERAPEUTIC INTERNATIONAL NORMALIZED RATIO (INR): ICD-10-CM

## 2018-07-24 DIAGNOSIS — E11.40 NEUROPATHY DUE TO TYPE 2 DIABETES MELLITUS (HCC): ICD-10-CM

## 2018-07-24 DIAGNOSIS — J96.11 CHRONIC RESPIRATORY FAILURE WITH HYPOXIA (HCC): ICD-10-CM

## 2018-07-24 DIAGNOSIS — S32.039A CLOSED FRACTURE OF THIRD LUMBAR VERTEBRA, UNSPECIFIED FRACTURE MORPHOLOGY, INITIAL ENCOUNTER (HCC): Primary | ICD-10-CM

## 2018-07-24 DIAGNOSIS — N28.9 RENAL INSUFFICIENCY: ICD-10-CM

## 2018-07-24 LAB
ANION GAP SERPL CALC-SCNC: 9 MMOL/L (ref 0–18)
BASOPHILS # BLD AUTO: 0.03 X10(3) UL (ref 0–0.1)
BASOPHILS NFR BLD AUTO: 0.4 %
BILIRUB UR QL STRIP.AUTO: NEGATIVE
BUN BLD-MCNC: 25 MG/DL (ref 8–20)
BUN/CREAT SERPL: 18.1 (ref 10–20)
CALCIUM BLD-MCNC: 8.9 MG/DL (ref 8.3–10.3)
CHLORIDE SERPL-SCNC: 107 MMOL/L (ref 101–111)
CLARITY UR REFRACT.AUTO: CLEAR
CO2 SERPL-SCNC: 25 MMOL/L (ref 22–32)
COLOR UR AUTO: YELLOW
CREAT BLD-MCNC: 1.38 MG/DL (ref 0.55–1.02)
EOSINOPHIL # BLD AUTO: 0.56 X10(3) UL (ref 0–0.3)
EOSINOPHIL NFR BLD AUTO: 8 %
ERYTHROCYTE [DISTWIDTH] IN BLOOD BY AUTOMATED COUNT: 16.5 % (ref 11.5–16)
GLUCOSE BLD-MCNC: 125 MG/DL (ref 70–99)
GLUCOSE BLD-MCNC: 90 MG/DL (ref 65–99)
GLUCOSE UR STRIP.AUTO-MCNC: NEGATIVE MG/DL
HCT VFR BLD AUTO: 34.1 % (ref 34–50)
HGB BLD-MCNC: 10.5 G/DL (ref 12–16)
IMMATURE GRANULOCYTE COUNT: 0.02 X10(3) UL (ref 0–1)
IMMATURE GRANULOCYTE RATIO %: 0.3 %
INR BLD: 1.63 (ref 0.9–1.1)
KETONES UR STRIP.AUTO-MCNC: NEGATIVE MG/DL
LEUKOCYTE ESTERASE UR QL STRIP.AUTO: NEGATIVE
LYMPHOCYTES # BLD AUTO: 1.19 X10(3) UL (ref 0.9–4)
LYMPHOCYTES NFR BLD AUTO: 17 %
MCH RBC QN AUTO: 28 PG (ref 27–33.2)
MCHC RBC AUTO-ENTMCNC: 30.8 G/DL (ref 31–37)
MCV RBC AUTO: 90.9 FL (ref 81–100)
MONOCYTES # BLD AUTO: 0.58 X10(3) UL (ref 0.1–1)
MONOCYTES NFR BLD AUTO: 8.3 %
NEUTROPHIL ABS PRELIM: 4.62 X10 (3) UL (ref 1.3–6.7)
NEUTROPHILS # BLD AUTO: 4.62 X10(3) UL (ref 1.3–6.7)
NEUTROPHILS NFR BLD AUTO: 66 %
NITRITE UR QL STRIP.AUTO: NEGATIVE
OSMOLALITY SERPL CALC.SUM OF ELEC: 298 MOSM/KG (ref 275–295)
PH UR STRIP.AUTO: 5 [PH] (ref 4.5–8)
PLATELET # BLD AUTO: 256 10(3)UL (ref 150–450)
POTASSIUM SERPL-SCNC: 4.9 MMOL/L (ref 3.6–5.1)
PROT UR STRIP.AUTO-MCNC: NEGATIVE MG/DL
PSA SERPL DL<=0.01 NG/ML-MCNC: 19.9 SECONDS (ref 12.4–14.7)
RBC # BLD AUTO: 3.75 X10(6)UL (ref 3.8–5.1)
RBC UR QL AUTO: NEGATIVE
RED CELL DISTRIBUTION WIDTH-SD: 54.4 FL (ref 35.1–46.3)
SODIUM SERPL-SCNC: 141 MMOL/L (ref 136–144)
SP GR UR STRIP.AUTO: 1.02 (ref 1–1.03)
UROBILINOGEN UR STRIP.AUTO-MCNC: 2 MG/DL
WBC # BLD AUTO: 7 X10(3) UL (ref 4–13)

## 2018-07-24 PROCEDURE — 72110 X-RAY EXAM L-2 SPINE 4/>VWS: CPT | Performed by: EMERGENCY MEDICINE

## 2018-07-24 PROCEDURE — 99219 INITIAL OBSERVATION CARE,LEVL II: CPT | Performed by: HOSPITALIST

## 2018-07-24 RX ORDER — ATENOLOL 25 MG/1
25 TABLET ORAL DAILY
Status: DISCONTINUED | OUTPATIENT
Start: 2018-07-25 | End: 2018-07-27

## 2018-07-24 RX ORDER — ALBUTEROL SULFATE 90 UG/1
2 AEROSOL, METERED RESPIRATORY (INHALATION) EVERY 6 HOURS PRN
Status: DISCONTINUED | OUTPATIENT
Start: 2018-07-24 | End: 2018-07-27

## 2018-07-24 RX ORDER — HYDROCODONE BITARTRATE AND ACETAMINOPHEN 10; 325 MG/1; MG/1
1 TABLET ORAL EVERY 4 HOURS PRN
Status: DISCONTINUED | OUTPATIENT
Start: 2018-07-24 | End: 2018-07-27

## 2018-07-24 RX ORDER — ACETAMINOPHEN 325 MG/1
650 TABLET ORAL EVERY 6 HOURS PRN
Status: DISCONTINUED | OUTPATIENT
Start: 2018-07-24 | End: 2018-07-27

## 2018-07-24 RX ORDER — MORPHINE SULFATE 4 MG/ML
1 INJECTION, SOLUTION INTRAMUSCULAR; INTRAVENOUS EVERY 2 HOUR PRN
Status: DISCONTINUED | OUTPATIENT
Start: 2018-07-24 | End: 2018-07-27

## 2018-07-24 RX ORDER — METOCLOPRAMIDE HYDROCHLORIDE 5 MG/ML
5 INJECTION INTRAMUSCULAR; INTRAVENOUS EVERY 8 HOURS PRN
Status: DISCONTINUED | OUTPATIENT
Start: 2018-07-24 | End: 2018-07-27

## 2018-07-24 RX ORDER — MORPHINE SULFATE 4 MG/ML
4 INJECTION, SOLUTION INTRAMUSCULAR; INTRAVENOUS EVERY 2 HOUR PRN
Status: DISCONTINUED | OUTPATIENT
Start: 2018-07-24 | End: 2018-07-27

## 2018-07-24 RX ORDER — DIPHENHYDRAMINE HYDROCHLORIDE 50 MG/ML
50 INJECTION INTRAMUSCULAR; INTRAVENOUS ONCE
Status: COMPLETED | OUTPATIENT
Start: 2018-07-24 | End: 2018-07-24

## 2018-07-24 RX ORDER — WARFARIN SODIUM 5 MG/1
5 TABLET ORAL
Status: DISCONTINUED | OUTPATIENT
Start: 2018-07-24 | End: 2018-07-27

## 2018-07-24 RX ORDER — DULOXETIN HYDROCHLORIDE 30 MG/1
60 CAPSULE, DELAYED RELEASE ORAL DAILY
Status: DISCONTINUED | OUTPATIENT
Start: 2018-07-25 | End: 2018-07-27

## 2018-07-24 RX ORDER — MORPHINE SULFATE 4 MG/ML
2 INJECTION, SOLUTION INTRAMUSCULAR; INTRAVENOUS EVERY 2 HOUR PRN
Status: DISCONTINUED | OUTPATIENT
Start: 2018-07-24 | End: 2018-07-27

## 2018-07-24 RX ORDER — DEXTROSE MONOHYDRATE 25 G/50ML
50 INJECTION, SOLUTION INTRAVENOUS
Status: DISCONTINUED | OUTPATIENT
Start: 2018-07-24 | End: 2018-07-27

## 2018-07-24 RX ORDER — WARFARIN SODIUM 5 MG/1
5 TABLET ORAL
Status: DISCONTINUED | OUTPATIENT
Start: 2018-07-25 | End: 2018-07-24

## 2018-07-24 RX ORDER — ONDANSETRON 2 MG/ML
4 INJECTION INTRAMUSCULAR; INTRAVENOUS EVERY 6 HOURS PRN
Status: DISCONTINUED | OUTPATIENT
Start: 2018-07-24 | End: 2018-07-26

## 2018-07-24 RX ORDER — MORPHINE SULFATE 4 MG/ML
4 INJECTION, SOLUTION INTRAMUSCULAR; INTRAVENOUS EVERY 30 MIN PRN
Status: DISCONTINUED | OUTPATIENT
Start: 2018-07-24 | End: 2018-07-24

## 2018-07-24 RX ORDER — WARFARIN SODIUM 2.5 MG/1
2.5 TABLET ORAL
Status: DISCONTINUED | OUTPATIENT
Start: 2018-07-29 | End: 2018-07-27

## 2018-07-24 NOTE — ED INITIAL ASSESSMENT (HPI)
Patient fell last Tuesday, was seen Wednesday and admitted for back pain. Patient is severe today. She had Norco in am with no relief. Is now vomiting and daughter gave Zofran 8mg at 4pm today.

## 2018-07-24 NOTE — ED PROVIDER NOTES
Patient Seen in: [facility] Emergency Department    History   Patient presents with:  Back Pain (musculoskeletal)    Stated Complaint: back pain    HPI    Patient had been admitted earlier in the month with intractable low back pain.   Patient has a mass measuring 1.2 cm   • Macular degeneration     Receiving injections   • Neuropathy     legs long standing problem   • Nontoxic multinodular goiter     Dx in 6/2013 - thyroid U/S showed right upper nodule of 1.1 cm, right midpole nodule of 3.2 cm and le negative except as noted above.     Physical Exam   ED Triage Vitals [07/24/18 1637]  BP: (!) 170/76  Pulse: 66  Resp: 18  Temp: 96.9 °F (36.1 °C)  Temp src: Temporal  SpO2: 97 %  O2 Device: None (Room air)    Current:/76   Pulse 86   Temp 96.9 °F (36 CBC WITH DIFFERENTIAL WITH PLATELET.   Procedure                               Abnormality         Status                     ---------                               -----------         ------                     CBC W/ DIFFERENTIAL[[id number]]          [facility]    Disposition:  Admit  7/24/2018  6:13 pm    Follow-up:  No follow-up provider specified.       Medications Prescribed:  Current Discharge Medication List        Present on Admission  Date Reviewed: 5/11/2018          ICD-10-CM Noted POA    Closed fr

## 2018-07-24 NOTE — TELEPHONE ENCOUNTER
Raad Doe, pt's dtr indicates pt is having severe back pain, crying out in pain, 10 out of 10 pain level. Raad Doe notified pt will need to go to the ER to be evaluated. Raad Doe verbalizes understanding.   OSMELI to TB

## 2018-07-25 LAB
ANION GAP SERPL CALC-SCNC: 6 MMOL/L (ref 0–18)
BASOPHILS # BLD AUTO: 0.05 X10(3) UL (ref 0–0.1)
BASOPHILS NFR BLD AUTO: 0.6 %
BUN BLD-MCNC: 24 MG/DL (ref 8–20)
BUN/CREAT SERPL: 17.4 (ref 10–20)
CALCIUM BLD-MCNC: 8.8 MG/DL (ref 8.3–10.3)
CHLORIDE SERPL-SCNC: 109 MMOL/L (ref 101–111)
CO2 SERPL-SCNC: 26 MMOL/L (ref 22–32)
CREAT BLD-MCNC: 1.38 MG/DL (ref 0.55–1.02)
EOSINOPHIL # BLD AUTO: 0.48 X10(3) UL (ref 0–0.3)
EOSINOPHIL NFR BLD AUTO: 5.8 %
ERYTHROCYTE [DISTWIDTH] IN BLOOD BY AUTOMATED COUNT: 16.7 % (ref 11.5–16)
GLUCOSE BLD-MCNC: 104 MG/DL (ref 65–99)
GLUCOSE BLD-MCNC: 122 MG/DL (ref 65–99)
GLUCOSE BLD-MCNC: 208 MG/DL (ref 65–99)
GLUCOSE BLD-MCNC: 87 MG/DL (ref 65–99)
GLUCOSE BLD-MCNC: 98 MG/DL (ref 70–99)
HCT VFR BLD AUTO: 32.9 % (ref 34–50)
HGB BLD-MCNC: 10 G/DL (ref 12–16)
IMMATURE GRANULOCYTE COUNT: 0.02 X10(3) UL (ref 0–1)
IMMATURE GRANULOCYTE RATIO %: 0.2 %
INR BLD: 1.78 (ref 0.9–1.1)
LYMPHOCYTES # BLD AUTO: 1.19 X10(3) UL (ref 0.9–4)
LYMPHOCYTES NFR BLD AUTO: 14.4 %
MCH RBC QN AUTO: 27.5 PG (ref 27–33.2)
MCHC RBC AUTO-ENTMCNC: 30.4 G/DL (ref 31–37)
MCV RBC AUTO: 90.4 FL (ref 81–100)
MONOCYTES # BLD AUTO: 0.61 X10(3) UL (ref 0.1–1)
MONOCYTES NFR BLD AUTO: 7.4 %
NEUTROPHIL ABS PRELIM: 5.89 X10 (3) UL (ref 1.3–6.7)
NEUTROPHILS # BLD AUTO: 5.89 X10(3) UL (ref 1.3–6.7)
NEUTROPHILS NFR BLD AUTO: 71.6 %
OSMOLALITY SERPL CALC.SUM OF ELEC: 296 MOSM/KG (ref 275–295)
PLATELET # BLD AUTO: 236 10(3)UL (ref 150–450)
POTASSIUM SERPL-SCNC: 4.8 MMOL/L (ref 3.6–5.1)
PSA SERPL DL<=0.01 NG/ML-MCNC: 21.3 SECONDS (ref 12.4–14.7)
RBC # BLD AUTO: 3.64 X10(6)UL (ref 3.8–5.1)
RED CELL DISTRIBUTION WIDTH-SD: 55 FL (ref 35.1–46.3)
SODIUM SERPL-SCNC: 141 MMOL/L (ref 136–144)
WBC # BLD AUTO: 8.2 X10(3) UL (ref 4–13)

## 2018-07-25 PROCEDURE — 99225 SUBSEQUENT OBSERVATION CARE: CPT | Performed by: HOSPITALIST

## 2018-07-25 RX ORDER — HYDROCODONE BITARTRATE AND ACETAMINOPHEN 10; 325 MG/1; MG/1
2 TABLET ORAL EVERY 6 HOURS PRN
Status: DISCONTINUED | OUTPATIENT
Start: 2018-07-25 | End: 2018-07-27

## 2018-07-25 RX ORDER — ALPRAZOLAM 0.25 MG/1
0.25 TABLET ORAL 2 TIMES DAILY PRN
Status: DISCONTINUED | OUTPATIENT
Start: 2018-07-25 | End: 2018-07-27

## 2018-07-25 RX ORDER — HYDROCODONE BITARTRATE AND ACETAMINOPHEN 10; 325 MG/1; MG/1
1 TABLET ORAL EVERY 6 HOURS PRN
Status: DISCONTINUED | OUTPATIENT
Start: 2018-07-25 | End: 2018-07-27

## 2018-07-25 RX ORDER — ALPRAZOLAM 0.25 MG/1
0.25 TABLET ORAL ONCE
Status: COMPLETED | OUTPATIENT
Start: 2018-07-25 | End: 2018-07-25

## 2018-07-25 NOTE — OCCUPATIONAL THERAPY NOTE
OCCUPATIONAL THERAPY EVALUATION - INPATIENT     Room Number: 356/356-A  Evaluation Date: 7/25/2018  Type of Evaluation: Initial  Presenting Problem:  (L3 compression fracture)    Physician Order: IP Consult to Occupational Therapy  Reason for Therapy: ADL/ degeneration     Receiving injections   • Neuropathy     legs long standing problem   • Nontoxic multinodular goiter     Dx in 6/2013 - thyroid U/S showed right upper nodule of 1.1 cm, right midpole nodule of 3.2 cm and left inferior nodule of 1.6 cm with ago, the patient was mod I for household mobility using a cane and could complete basic ADLs on her own. Lives with her dtr who provides IADL assistance. Neither the patient nor her dtr drives, pt's son assists with transportation.      SUBJECTIVE   :I can' (AM-PAC Scale): 35.96  CMS Modifier (G-Code): CK    FUNCTIONAL TRANSFER ASSESSMENT  Supine to Sit : Minimum assistance  Sit to Stand: Minimum assistance    Skilled Therapy Provided: UEs and cognition briefly assessed in supine.  OT educated patient on OT ro rehab is recommended for 7-10 days.   After this period of rehabilitation patient should achieve supervision level in basic ADLs    Patient Complexity  Occupational Profile/Medical History MODERATE   Specific performance deficits impacting engagement in ADL

## 2018-07-25 NOTE — PLAN OF CARE
NURSING ADMISSION NOTE      Patient admitted via cart from ER   Oriented to room. Safety precautions initiated. Bed in low position. Call light in reach. Patient here after a fall last week. Pain to much to handle at home. Alert Ox4.  Patient has hx

## 2018-07-25 NOTE — PROGRESS NOTES
Pharmacy Note: Renal dose adjustment for Metoclopramide (Reglan)  Keshawn Mckinnon has been prescribed Metoclopramide (Reglan) 10 mg every 8 hours as needed. Estimated Creatinine Clearance: 25.9 mL/min (A) (based on SCr of 1.38 mg/dL (H)).     Her calcul

## 2018-07-25 NOTE — PROGRESS NOTES
[facility]ITAL[facility]  Progress Note     Keshawn Gomezuntain Patient Status:  Observation    1928 MRN [id number]   [facility] 3SW-A Attending Dari Angel MD   Hosp Day # 0 PCP Maggie Beltran MD     Chief Complaint: fall and back pain    S Oral Once per day on Sun   • Insulin Aspart Pen  2-10 Units Subcutaneous TID CC and HS   • Warfarin Sodium  5 mg Oral Once per day on Mon Tue Wed Thu Fri Sat       ASSESSMENT / PLAN:     1. Back pain  1. Due to L3 comprlession fracture  2.  Appears improved

## 2018-07-25 NOTE — CM/SW NOTE
07/25/18 1400   CM/SW Referral Data   Referral Source Physician   Reason for Referral Discharge planning   Informant Patient; Children;[facility] Staff   Pertinent Medical Hx   Primary Care Physician Name JOANNA Staton MD   Patient Info   Patient's Mental Stat home O2 through [facility] [phone]. Referral sent via [place] to [address] [place] [address]. Pt is accepted. SW will send AVS at d/c.

## 2018-07-25 NOTE — PHYSICAL THERAPY NOTE
PHYSICAL THERAPY EVALUATION - INPATIENT     Room Number: 356/356-A  Evaluation Date: 7/25/2018  Type of Evaluation: Initial  Physician Order: PT Eval and Treat    Presenting Problem: Compression fx L3  Reason for Therapy: Mobility Dysfunction and Dis of colon (without mention of hemorrhage)    • Essential hypertension    • Fitting and adjustment of cardiac pacemaker    • HIGH BLOOD PRESSURE    • Hypertension    • Lung cancer ([facility] [address]    • Lung nodule     CT scan of chest from 8/2013 - Right lung upper lob Lives With: Daughter  Drives: No  Patient Owned Equipment: Cane (rollator)  Patient Regularly Uses: Glasses; Home O2 (2L/min at baseline)    Prior Level of [place]: Pt dtr and son present and assist in providing history.  Pt typically is ambulatory Mobility Scale: 4/20                           NEUROLOGICAL FINDINGS  Neurological Findings: None                   ACTIVITY TOLERANCE  O2 Saturation: 90-94%  O2 Device: Nasal cannula  Liters of O2:  3.5  Shortness of breath  Blood Pressure: 130/88    AM-P concerned re: payment. If pt returns home rec 24 hour supervision and HHPT. Pt family in agreement.     Exercise/Education Provided:  Bed mobility  Body mechanics  Gait training  Transfer training    Patient End of Session: Up in chair;Needs met;Call light of function. DISCHARGE RECOMMENDATIONS  PT Discharge Recommendations: Sub-acute rehabilitation (with [facility]OS 9-11 days)    PLAN  PT Treatment Plan: Bed mobility; Body mechanics; Endurance; Patient education;Gait training;Strengthening;Transfer training;Balance

## 2018-07-25 NOTE — OCCUPATIONAL THERAPY NOTE
OCCUPATIONAL THERAPY                 Two attempts made to see patient for OT evaluation.  On first attempt the patient eating was breakfast and agreed to have therapists come back, on second attempt patient refused stating she is nauseous and do

## 2018-07-25 NOTE — PHYSICAL THERAPY NOTE
Attempted PT eval x 2. Initially pt eating breakfast, pt now c/o nausea/pain and refuses PT eval. Pt encouraged and continues to refuse. RN aware. Will re-attempt as schedule allows.

## 2018-07-26 LAB
GLUCOSE BLD-MCNC: 101 MG/DL (ref 65–99)
GLUCOSE BLD-MCNC: 114 MG/DL (ref 65–99)
GLUCOSE BLD-MCNC: 78 MG/DL (ref 65–99)
GLUCOSE BLD-MCNC: 91 MG/DL (ref 65–99)
INR BLD: 2.55 (ref 0.9–1.1)
PSA SERPL DL<=0.01 NG/ML-MCNC: 28.3 SECONDS (ref 12.4–14.7)

## 2018-07-26 PROCEDURE — 99225 SUBSEQUENT OBSERVATION CARE: CPT | Performed by: HOSPITALIST

## 2018-07-26 RX ORDER — ONDANSETRON 2 MG/ML
4 INJECTION INTRAMUSCULAR; INTRAVENOUS EVERY 6 HOURS PRN
Status: DISCONTINUED | OUTPATIENT
Start: 2018-07-26 | End: 2018-07-27

## 2018-07-26 RX ORDER — ONDANSETRON 4 MG/1
4 TABLET, ORALLY DISINTEGRATING ORAL EVERY 6 HOURS PRN
Status: DISCONTINUED | OUTPATIENT
Start: 2018-07-26 | End: 2018-07-27

## 2018-07-26 RX ORDER — HYDROCODONE BITARTRATE AND ACETAMINOPHEN 5; 300 MG/1; MG/1
1 TABLET ORAL EVERY 8 HOURS PRN
Qty: 20 TABLET | Refills: 0 | Status: SHIPPED | OUTPATIENT
Start: 2018-07-26 | End: 2018-08-29

## 2018-07-26 NOTE — PHYSICAL THERAPY NOTE
Attempted to see pt for PT treatment. Pt refuses stating that she just got washed up and is waiting for breakfast. Pt reports increased back pain following activity. Pt encouraged and continues to refuse at this time. Dtr present and requests return later.

## 2018-07-26 NOTE — PLAN OF CARE
Pt up to bathroom with chair-back brace on, gait slow, unsteady. States pain is too high to go home. Pt has allergy to lidoderm patch, has chronic pain, no change in pain meds. Refused cardiac diet. Pt placed on regular diet.

## 2018-07-26 NOTE — PLAN OF CARE
Impaired Functional Mobility    • Achieve highest/safest level of mobility/gait Not Progressing        PAIN - ADULT    • Verbalizes/displays adequate comfort level or patient's stated pain goal Not Progressing        SAFETY ADULT - FALL    • Free from fall

## 2018-07-26 NOTE — PHYSICAL THERAPY NOTE
Attempted x 2 to see pt this pm. Initially pt refused s/t she needs breathing treatment first. On second attempt 30 min later, pt had returned to supine and was sleeping.  Pt dtr requests to allow pt to rest and check back in am. Discussed importance of mob

## 2018-07-27 VITALS
WEIGHT: 135 LBS | OXYGEN SATURATION: 98 % | RESPIRATION RATE: 20 BRPM | TEMPERATURE: 99 F | HEIGHT: 66 IN | BODY MASS INDEX: 21.69 KG/M2 | DIASTOLIC BLOOD PRESSURE: 59 MMHG | SYSTOLIC BLOOD PRESSURE: 131 MMHG | HEART RATE: 64 BPM

## 2018-07-27 LAB
GLUCOSE BLD-MCNC: 145 MG/DL (ref 65–99)
GLUCOSE BLD-MCNC: 329 MG/DL (ref 65–99)
GLUCOSE BLD-MCNC: 66 MG/DL (ref 65–99)
GLUCOSE BLD-MCNC: 94 MG/DL (ref 65–99)
GLUCOSE BLD-MCNC: 99 MG/DL (ref 65–99)
INR BLD: 3.88 (ref 0.9–1.1)
PSA SERPL DL<=0.01 NG/ML-MCNC: 39.2 SECONDS (ref 12.4–14.7)

## 2018-07-27 PROCEDURE — 99217 OBSERVATION CARE DISCHARGE: CPT | Performed by: HOSPITALIST

## 2018-07-27 RX ORDER — DOCUSATE SODIUM 100 MG/1
100 CAPSULE, LIQUID FILLED ORAL 2 TIMES DAILY
Status: DISCONTINUED | OUTPATIENT
Start: 2018-07-27 | End: 2018-07-27

## 2018-07-27 NOTE — CM/SW NOTE
Call from RN that dtr did not bring pt's portable O2 tank with her. Dtr wonders if she can get one here, pt's provider is [facility].     SW contacted [facility] but informed that dtr will need to go home and get pt's portable tank- an additional tank cannot be provided

## 2018-07-27 NOTE — PHYSICAL THERAPY NOTE
PHYSICAL THERAPY TREATMENT NOTE - INPATIENT    Room Number: 356/356-A     Session: 1   Number of Visits to Meet Established Goals: 5    Presenting Problem: Compression Fx L3  History related to current admission: Pt admitted from home 7/24/18 with logan of cardiac pacemaker    • HIGH BLOOD PRESSURE    • Hypertension    • Lung cancer ([facility] [address]    • Lung nodule     CT scan of chest from 8/2013 - Right lung upper lobe mass measuring 1.2 cm   • Macular degeneration     Receiving injections   • Neuropathy     legs home.    OBJECTIVE  Precautions: Spine;Lumbar brace    WEIGHT BEARING RESTRICTION  Weight Bearing Restriction: None                PAIN ASSESSMENT   Rating: Unable to rate  Location: Low back  Management Techniques: Activity promotion; Body mechanics;Breath balance. Needed total assist to don LSO. Patient needed min assist for sit to stand with rollator. Patient ambulated 50 ft with RW & min assist - needed assist to maneuver Rollator safely. Severe slouching noted & questionable safety noted.  Concerns were a demonstrate supine - sit EOB @ level: supervision      Goal #2 Patient is able to demonstrate transfers Sit to/from Stand at assistance level: supervision      Goal #3 Patient is able to ambulate 75 feet with assist device: walker - rolling at assistance l

## 2018-07-27 NOTE — PROGRESS NOTES
Patient seen and examined    S- pain better , brace delivered    General- NAd  Chest- CTAB  CVS- RRR  Abdo- soft, NT,BS+    Plan  Dc home with HH  Hold coumadin tonight, recheck in am with Doyle Craven M.D.   [facility]

## 2018-07-27 NOTE — PROGRESS NOTES
NURSING DISCHARGE NOTE    Discharged via wheelchair  Accompanied by daughter and friend  Belongings all sent with the patient

## 2018-07-27 NOTE — CM/SW NOTE
07/27/18 1340   Discharge disposition   Expected discharge disposition Home-Health   Name of [address]   Discharge transportation Private car

## 2018-07-27 NOTE — PROGRESS NOTES
Accucheck of 66 this am, asymptomatic. Protocol followed, pt tolerated it well.  Current blood glucose is 99, ordering breakfast. Dr. Hyun Cavanaugh made aware

## 2018-07-27 NOTE — OCCUPATIONAL THERAPY NOTE
OCCUPATIONAL THERAPY TREATMENT NOTE - INPATIENT     Room Number: 356/356-A  Session: 1/5       Presenting Problem:  (L3 compression fracture)    History related to current admission:   Admitted with intractable back pain on 7/24.  Patient had been admitted [facility])    • Lung nodule     CT scan of chest from 8/2013 - Right lung upper lobe mass measuring 1.2 cm   • Macular degeneration     Receiving injections   • Neuropathy     legs long standing problem   • Nontoxic multinodular goiter     Dx in 6/2013 - thyroi Saturation: 97 at rest%  O2 Device: Nasal cannula  Liters of O2:  2-3  Shortness of breath    ACTIVITIES OF DAILY LIVING ASSESSMENT  AM-PAC ‘6-Clicks’ Inpatient Daily Activity Short Form  How much help from another person does the patient currently need… deconditioned and has limited endurance due to COPD. Patient is recommended to go to sub acute rehab as she is below her baseline, however she and her daughter who is caregiver have declined [facility]K and wish to return home.  Patient will need 24 hour supervisio

## 2018-07-29 ENCOUNTER — LAB REQUISITION (OUTPATIENT)
Dept: LAB | Facility: HOSPITAL | Age: 83
End: 2018-07-29
Attending: INTERNAL MEDICINE
Payer: MEDICARE

## 2018-07-29 DIAGNOSIS — M54.50 LOW BACK PAIN: ICD-10-CM

## 2018-07-29 LAB
INR BLD: 4.05 (ref 0.9–1.1)
PSA SERPL DL<=0.01 NG/ML-MCNC: 40.6 SECONDS (ref 12.4–14.7)

## 2018-07-29 PROCEDURE — 85610 PROTHROMBIN TIME: CPT | Performed by: INTERNAL MEDICINE

## 2018-07-29 NOTE — DISCHARGE SUMMARY
[facility] HOSPITAL[facility]  DISCHARGE SUMMARY     [place] Patient Status:  Observation    1928 MRN [id number]   [facility] 3SW-A Attending No att. providers found   Hosp Day # 0 PCP Tyson Clifton MD     Date of Admission: 2018  D remained on her chronic O2 requirements.   She is discharged home in good condition    Procedures during hospitalization:   • none    Incidental or significant findings and recommendations (brief descriptions):  • none    Lab/Test results pending at [facility] on Sunday   Refills:  0           Where to Get Your Medications      Please  your prescriptions at the location directed by your doctor or nurse    Bring a paper prescription for each of these medications  · Hydrocodone-Acetaminophen 5-300 MG Tabs

## 2018-07-30 ENCOUNTER — TELEPHONE (OUTPATIENT)
Dept: INTERNAL MEDICINE CLINIC | Facility: CLINIC | Age: 83
End: 2018-07-30

## 2018-07-30 ENCOUNTER — PATIENT OUTREACH (OUTPATIENT)
Dept: CASE MANAGEMENT | Age: 83
End: 2018-07-30

## 2018-07-30 ENCOUNTER — PATIENT MESSAGE (OUTPATIENT)
Dept: CASE MANAGEMENT | Age: 83
End: 2018-07-30

## 2018-07-30 DIAGNOSIS — M54.59 INTRACTABLE LOW BACK PAIN: ICD-10-CM

## 2018-07-30 DIAGNOSIS — S32.039A CLOSED FRACTURE OF THIRD LUMBAR VERTEBRA, UNSPECIFIED FRACTURE MORPHOLOGY, INITIAL ENCOUNTER (HCC): ICD-10-CM

## 2018-07-30 DIAGNOSIS — J96.11 CHRONIC RESPIRATORY FAILURE WITH HYPOXIA (HCC): ICD-10-CM

## 2018-07-30 NOTE — TELEPHONE ENCOUNTER
Called Melissa Felicaino RN from [address] [place] [address] at [phone] unable to LM on , mailbox full, see result note.

## 2018-07-30 NOTE — TELEPHONE ENCOUNTER
Lisa Clark RN from [facility] is calling to find out when pts PT-INR needs to be redrawn,     Please advise

## 2018-07-31 ENCOUNTER — TELEPHONE (OUTPATIENT)
Dept: INTERNAL MEDICINE CLINIC | Facility: CLINIC | Age: 83
End: 2018-07-31

## 2018-07-31 DIAGNOSIS — E11.40 NEUROPATHY DUE TO TYPE 2 DIABETES MELLITUS (HCC): ICD-10-CM

## 2018-07-31 DIAGNOSIS — F11.20 CHRONIC NARCOTIC DEPENDENCE (HCC): ICD-10-CM

## 2018-07-31 RX ORDER — HYDROCODONE BITARTRATE AND ACETAMINOPHEN 5; 300 MG/1; MG/1
1 TABLET ORAL EVERY 8 HOURS PRN
Qty: 90 TABLET | Refills: 0 | Status: ON HOLD | OUTPATIENT
Start: 2018-07-31 | End: 2018-11-02

## 2018-07-31 NOTE — TELEPHONE ENCOUNTER
I am giving refill for 30 days. I really love seeing patient but not possible due to her situation.   Advise her to see house MD going forward

## 2018-07-31 NOTE — TELEPHONE ENCOUNTER
Called pt's daughter, Sukhdeep Raymond, back (Ok per HIPAA) for more information.   She states pt is in a lot of pain and is unable to come for her HFU as Sukhdeep Raymond does not have transportation nor is pt able to walk down the stairs or even get dressed because she is in so mu

## 2018-07-31 NOTE — PROGRESS NOTES
Initial Post Discharge Follow Up   Discharge Date: 7/27/18  Contact Date: 7/30/2018    Consent Verification:  Assessment Completed With: Other: [place] patient's daughter Permission received per patient?  written  HIPAA Verified?   Yes    Discharge Dx: were leaving the hospital were your discharge instructions explained to you? yes  • How well were your discharge instructions explained to you?   o On a scale of 1-5   1- Very Poor and 5- Very well   o Very well  • Do you have any questions about your disc Inhalation Aerosol Inhale 2 puffs into the lungs 2 (two) times daily.  Disp:  Rfl:      • When you were leaving the hospital were any medication changes discussed with you? yes  • May I go over your medications with you to make sure we are not missing anyth her mother there. Zain Coreas reports she is no longer driving. Is there any reason as to why you cannot make your appointments?    No     NCM Reviewed upcoming Specialist Appt with patient     Not Applicable, at this time        Interventions by NCM:  ROSALIO rev

## 2018-07-31 NOTE — TELEPHONE ENCOUNTER
Called Shira Field back. Norco script provided by TB for 90 tabs, 0 refills. Signed script placed in envelope by front office for . Shira Field appreciated it.   Also advised per TB, going forward pt should be followed by home MD since pt's current status is

## 2018-07-31 NOTE — TELEPHONE ENCOUNTER
Pt's daughter [employer]AFFANSTORP calling to cancel her Moms HFU on 8/2/18 She does not have transportation to get her here. Pt also has a fracture from a fall and is in so much pain she will be unable get her dressed and manage to get her here.  Pt would like to go back t

## 2018-08-01 ENCOUNTER — TELEPHONE (OUTPATIENT)
Dept: INTERNAL MEDICINE CLINIC | Facility: CLINIC | Age: 83
End: 2018-08-01

## 2018-08-01 NOTE — TELEPHONE ENCOUNTER
Called Amalia back and advised to recheck INR level and call our office back with the results. TB in meeting and will inform her and ask if further testing is needed. Jim Huynh verbalized understanding.

## 2018-08-01 NOTE — TELEPHONE ENCOUNTER
INR/PT done 7/29   INR 4.0 and PT 40.6  Will see pt today in the next 5 min-any new orders? ? Re check the labs while she is there?

## 2018-08-01 NOTE — TELEPHONE ENCOUNTER
John Gann called back,   with INR number. 3.2  PT was 39.0  Pt was instructed to take 5 mg of COMUDIAN tonight.  If any different pls let John Gann know  Pls call Call back at [phone]

## 2018-08-01 NOTE — TELEPHONE ENCOUNTER
Called Amalia. Advised that per JL pt needs to take 2.5 mg Coumadin tonight and recheck INR tomorrow. Jalen Johnson read back correctly and stated she could check INR tomorrow.   She also said she would call pt's daughter, [facility] per HIPAA) to explain instructi

## 2018-08-02 NOTE — TELEPHONE ENCOUNTER
Pts son Mingo Rubi picked up the RX. Got a verbal from the pt that it was ok for him to pick it up since he was not on HIPPA. Pt advised to add Marcella Antonio and pts granddaughter Greg Chappell to [place].

## 2018-08-02 NOTE — TELEPHONE ENCOUNTER
Called and spoke with Stevie Lara @[facility] to inform, per TB, ok for early refill of Hydrocodone-Acetaminophen 5-300 MG. Pharmacist verbalized understanding and agreed with POC.

## 2018-08-02 NOTE — TELEPHONE ENCOUNTER
Rossi Wright [phone]     Calling to obtain a verbal ok to fill medication for pt on Hydrocodone-Acetaminophen 5-300 MG Oral Tab rx was picked up on 7/14/18 for a 30 day supply which she is not due for a refill until 8/10/18 she then picked up another 7

## 2018-08-02 NOTE — TELEPHONE ENCOUNTER
Spoke with Meredith Mcgee [facility] stating as instructed pt took 2.5mg coumadin last night. Rechecked INR today @3.4  PT @41.9  RN will be back to visit pt on Monday (8/6/18)    Please advise on coumadin regimen Thank you.

## 2018-08-03 NOTE — TELEPHONE ENCOUNTER
Allyne Leyden called back stating she instructed pt to take Coumadin 2.5 mg last night since she did not hear back from anyone yesterday. Advised per TB, pt is to take 2.5 mg nightly until Monday 8/6 when her INR will be checked again.   Allyne Leyden verbalized [employer]

## 2018-08-03 NOTE — TELEPHONE ENCOUNTER
Have her take 2.5mg nightly until INR rechecked Monday.   She was not able to come in for HFU due to her situation (hard to follow up with me for months now)  I would like them to find home MD soon (like within 30 days), cannot manage coumadin levels and na

## 2018-08-06 ENCOUNTER — TELEPHONE (OUTPATIENT)
Dept: INTERNAL MEDICINE CLINIC | Facility: CLINIC | Age: 83
End: 2018-08-06

## 2018-08-06 DIAGNOSIS — R11.0 NAUSEA: ICD-10-CM

## 2018-08-06 DIAGNOSIS — K52.9 GASTROENTERITIS: ICD-10-CM

## 2018-08-06 RX ORDER — ONDANSETRON 4 MG/1
4 TABLET, FILM COATED ORAL EVERY 8 HOURS PRN
Qty: 30 TABLET | Refills: 1 | Status: SHIPPED | OUTPATIENT
Start: 2018-08-06 | End: 2018-09-04

## 2018-08-06 NOTE — TELEPHONE ENCOUNTER
LOV: 05/11/2018  Future Visit: No appointment scheduled   Last Labs: 07/29/2018 Prothrombin time   Last Rx: 05/11/2018 (30 tab with 1 refill)    Per protocol sent to provider

## 2018-08-06 NOTE — TELEPHONE ENCOUNTER
Spoke with Graciela at [facility] informed patient can take Warfarin 5mg Monday-Thursday and 2.5 mg Friday-Sunday and repeat INR in 1 week. Марина Galvin verbalized understanding and agreeable to POC.

## 2018-08-06 NOTE — TELEPHONE ENCOUNTER
Spoke with Shawn [facility] stating recheck INR today @2.2   Taking 2.5mg nightly    Please advise dose and when to recheck. Thank you.

## 2018-08-14 ENCOUNTER — MYAURORA ACCOUNT LINK (OUTPATIENT)
Dept: OTHER | Age: 83
End: 2018-08-14

## 2018-08-14 ENCOUNTER — TELEPHONE (OUTPATIENT)
Dept: INTERNAL MEDICINE CLINIC | Facility: CLINIC | Age: 83
End: 2018-08-14

## 2018-08-14 NOTE — TELEPHONE ENCOUNTER
Spoke with Luis Angel Gilmore Daughter ok per HIPAA-INR in good range 2.1, repeat in 2 weeks, same coumadin dosing. Luis Angel Gilmore verbalized understanding and agreeable to [facility]. Flowsheet updated.

## 2018-08-15 ENCOUNTER — TELEPHONE (OUTPATIENT)
Dept: INTERNAL MEDICINE CLINIC | Facility: CLINIC | Age: 83
End: 2018-08-15

## 2018-08-15 NOTE — TELEPHONE ENCOUNTER
Received fax from [facility] of plan needs physician signature     Placed in TB bin to review

## 2018-08-15 NOTE — TELEPHONE ENCOUNTER
order received from [facility] orders for add on discipline to be signed dated and faxed back. Forms placed in TB bin to be reviewed dated and signed .

## 2018-08-15 NOTE — TELEPHONE ENCOUNTER
Fax received from [facility] in regards to PT evaluation (time sensitive )  placed in TB bin to be reviewed signed and faxed back .

## 2018-08-21 ENCOUNTER — TELEPHONE (OUTPATIENT)
Dept: INTERNAL MEDICINE CLINIC | Facility: CLINIC | Age: 83
End: 2018-08-21

## 2018-08-21 DIAGNOSIS — J44.9 CHRONIC OBSTRUCTIVE PULMONARY DISEASE, UNSPECIFIED COPD TYPE (HCC): ICD-10-CM

## 2018-08-21 RX ORDER — ALBUTEROL SULFATE 90 UG/1
2 AEROSOL, METERED RESPIRATORY (INHALATION)
Qty: 8 G | Refills: 1 | Status: ON HOLD | OUTPATIENT
Start: 2018-08-21 | End: 2019-01-01

## 2018-08-21 NOTE — TELEPHONE ENCOUNTER
LFV:5/11/2018 wit TB  Future Appt: none on file, due to return now  Last Rx:5/11/18 but was discontinue while inpatient and advised to resume when discharged    Per protocol filled    Please call pt to schedule f/up

## 2018-08-21 NOTE — TELEPHONE ENCOUNTER
Called patient, Daughter, Rob Parker, answered - ok per OSMELI. She says Kelsey Rivero fractured her back and she isn't sure she can get her here. She says she also asked the pharmacy to hold off on this refill because they do not have the money at this time.   She said she

## 2018-08-23 ENCOUNTER — TELEPHONE (OUTPATIENT)
Dept: INTERNAL MEDICINE CLINIC | Facility: CLINIC | Age: 83
End: 2018-08-23

## 2018-08-23 NOTE — TELEPHONE ENCOUNTER
Order received from [facility] for physician order form placed in TB bin to be completed and faxed back

## 2018-08-27 ENCOUNTER — TELEPHONE (OUTPATIENT)
Dept: INTERNAL MEDICINE CLINIC | Facility: CLINIC | Age: 83
End: 2018-08-27

## 2018-08-27 NOTE — TELEPHONE ENCOUNTER
Mariella Kamara with [facility] called to let us know that the patient is due for a PT INR today, but she doesn't have a machine and will have to reschedule it to tomorrow. She will have the patient take her regular dose today.     Any questions, yo

## 2018-08-27 NOTE — TELEPHONE ENCOUNTER
Received fax from [facility] order for PT INR on 08/27/2018 needs physician signature     Placed in TB bin to review

## 2018-08-28 ENCOUNTER — TELEPHONE (OUTPATIENT)
Dept: INTERNAL MEDICINE CLINIC | Facility: CLINIC | Age: 83
End: 2018-08-28

## 2018-08-28 DIAGNOSIS — E11.40 NEUROPATHY DUE TO TYPE 2 DIABETES MELLITUS (HCC): ICD-10-CM

## 2018-08-28 DIAGNOSIS — F11.20 CHRONIC NARCOTIC DEPENDENCE (HCC): ICD-10-CM

## 2018-08-28 NOTE — TELEPHONE ENCOUNTER
Patient's daughter called requesting a refill on the Hydrocodone-Acetaminophen 5-300 MG Oral Tab for her Mother. Please call daughter(Allison) when ready to .

## 2018-08-28 NOTE — TELEPHONE ENCOUNTER
Spoke with Angella-daughter ok per HIPAA-informed TB received PT/INR results, INR close to 2 so OK for her age and situation, dame coumadin regimen and repeat INR in 1 month. Patient's daughter verbalized understanding and agreeable to POC.

## 2018-08-29 ENCOUNTER — TELEPHONE (OUTPATIENT)
Dept: INTERNAL MEDICINE CLINIC | Facility: CLINIC | Age: 83
End: 2018-08-29

## 2018-08-29 RX ORDER — HYDROCODONE BITARTRATE AND ACETAMINOPHEN 5; 300 MG/1; MG/1
1 TABLET ORAL EVERY 8 HOURS PRN
Qty: 90 TABLET | Refills: 0 | Status: SHIPPED | OUTPATIENT
Start: 2018-08-29 | End: 2018-10-01

## 2018-08-29 NOTE — TELEPHONE ENCOUNTER
Order received from [facility] for PT/INR on 8/28 placed in TB bin to be reviewed completed and faxed back .

## 2018-08-29 NOTE — TELEPHONE ENCOUNTER
LOV: 05/11/2018  Future Visit: No appointment scheduled   Last Labs: 21/81/8408 Basic Metabolic panel, CBC, Prothrombin time   Last Rx: 07/31/2018    Per protocol sent to provider

## 2018-08-29 NOTE — TELEPHONE ENCOUNTER
LAST SCRIPT, she will need to find a home MD now.  I already said this a while ago  She is not able to come in due to situation, cannot give narcotics without seeing her

## 2018-08-29 NOTE — TELEPHONE ENCOUNTER
rx received from TB .  Patients daughter notified script can be picked up and also that patient would need to find an in home MD to continue filling RX

## 2018-08-30 NOTE — TELEPHONE ENCOUNTER
Received fax from [facility], order to continue with current coumadin dose of 5 mg Mon, Tue, Wed, Thurs and coumadin 2.5mg on Fri, Sat, Sun. Repeat INR in 1 month    Placed in TB bin to review

## 2018-09-04 ENCOUNTER — TELEPHONE (OUTPATIENT)
Dept: INTERNAL MEDICINE CLINIC | Facility: CLINIC | Age: 83
End: 2018-09-04

## 2018-09-04 DIAGNOSIS — K52.9 GASTROENTERITIS: ICD-10-CM

## 2018-09-04 DIAGNOSIS — R11.0 NAUSEA: ICD-10-CM

## 2018-09-04 RX ORDER — ONDANSETRON 4 MG/1
4 TABLET, FILM COATED ORAL EVERY 8 HOURS PRN
Qty: 30 TABLET | Refills: 1 | Status: SHIPPED | OUTPATIENT
Start: 2018-09-04

## 2018-09-04 NOTE — TELEPHONE ENCOUNTER
LOV:5/11/18 TB  FOV:none on file   LAST RX:8/6/18 4 mg 1 tab by mouth every 8 hours 30 tabs 1 refill   LAST LABS:7/29/18  pt  PER PROTOCOL: to provider

## 2018-09-04 NOTE — TELEPHONE ENCOUNTER
Order received from [facility] for order type : physician order to continue pt at 2w4 1w1 . form placed in TB bin to be completed and faxed back .

## 2018-09-24 ENCOUNTER — TELEPHONE (OUTPATIENT)
Dept: INTERNAL MEDICINE CLINIC | Facility: CLINIC | Age: 83
End: 2018-09-24

## 2018-09-24 NOTE — TELEPHONE ENCOUNTER
INR too high, should hold coumadin tonight and tomorrow and rpt INR Wednesday. She is not able to do any HFU, TCM, regular f/u with me due to mobility issues. She needs to find home MD within 30 days.  I have repeatedly said this but not sure if anything

## 2018-09-24 NOTE — TELEPHONE ENCOUNTER
Today was patienst last visit for home health. She is being discharged today. Her INR was 5.3. She is taking 5mg coumadin m-thu and 2.5mg fri-sat-sun. She is wanting to know what your recommendation is at this point.

## 2018-09-24 NOTE — TELEPHONE ENCOUNTER
Spoke with Alexia Ureña, RN @[facility] to inform, per TB, INR too high- advised to hold coumadin tonight and tomorrow. Repeat INR Wednesday (9/26). Informed pt not able to do any HFU, TCM, regular f/u with PCP due to mobility issues.  Needs to find home MD [place]

## 2018-09-25 NOTE — TELEPHONE ENCOUNTER
Received fax from [facility], physician order, needs physician signature    Placed in TB bin to review

## 2018-09-26 NOTE — TELEPHONE ENCOUNTER
Lior Parrish from [facility] called with pts PTINR results. PT- 41.2 INR 3.3     Lior Parrish also stated that she was able to get authorization to see pt today but today will be her last day that she is able to go and see her in the home.  Pts family is in the proce

## 2018-09-26 NOTE — TELEPHONE ENCOUNTER
INR still too high, hold coumadin another night and reduce coumadin to 5mg Mon and Tuesday and 2.5 mg rest of the days  Repeat inr in 1 week  She needs home MD at this point, not just home health.  Can we contact a home MD agency and set up something for

## 2018-09-26 NOTE — TELEPHONE ENCOUNTER
Gilbert Brito from [facility] to inform, per TB, INR still too high. Advised to hold coumadin another night and reduce coumadin to 5mg Mon and Tuesday and 2.5 mg rest of the days.  Repeat inr in 1 week  Stressed the importance of home MD not just home hea

## 2018-09-26 NOTE — TELEPHONE ENCOUNTER
Fax received from TB from [facility] services signed, faxed back confirmation received and sent to scan

## 2018-10-01 DIAGNOSIS — E11.40 NEUROPATHY DUE TO TYPE 2 DIABETES MELLITUS (HCC): ICD-10-CM

## 2018-10-01 DIAGNOSIS — F11.20 CHRONIC NARCOTIC DEPENDENCE (HCC): ICD-10-CM

## 2018-10-01 RX ORDER — HYDROCODONE BITARTRATE AND ACETAMINOPHEN 5; 300 MG/1; MG/1
1 TABLET ORAL EVERY 8 HOURS PRN
Qty: 30 TABLET | Refills: 0 | Status: ON HOLD | OUTPATIENT
Start: 2018-10-01 | End: 2018-10-29

## 2018-10-01 NOTE — TELEPHONE ENCOUNTER
Re Hydrocodone-Acetaminophen 5-300 MG Oral Tab. Pt needs refill. She will be switching to a home care doctor (per TB suggestions)  and needs a refill until they can get her with the new doctor, ( has PW to fill out first) Pls call when ready for .

## 2018-10-01 NOTE — TELEPHONE ENCOUNTER
Patient called stating needing a refill on Hydrocodone until they they fine a home care doctor    Medication pended

## 2018-10-01 NOTE — TELEPHONE ENCOUNTER
Spoke with Rob Martines (patient daughter) and told her TB will no longer fill RX this is the last time we will be filling Hydrocodone, Rob Martines states she been looking she just has to do a lot of paperwork for home health, she states understanding and will be darrell

## 2018-10-05 ENCOUNTER — TELEPHONE (OUTPATIENT)
Dept: INTERNAL MEDICINE CLINIC | Facility: CLINIC | Age: 83
End: 2018-10-05

## 2018-10-05 NOTE — TELEPHONE ENCOUNTER
Form received from [facility] services with urgent time sensitive=ve physician order for PT eval .  Placed in TB bin to be completed and faxed back .

## 2018-10-05 NOTE — TELEPHONE ENCOUNTER
Forms received from [facility]  With urgent request for add on discipline for physical therapist to evaluate  and assess . forms placed in TB bin to be completed

## 2018-10-15 ENCOUNTER — TELEPHONE (OUTPATIENT)
Dept: INTERNAL MEDICINE CLINIC | Facility: CLINIC | Age: 83
End: 2018-10-15

## 2018-10-15 NOTE — TELEPHONE ENCOUNTER
Received medical record request from [address] [place] [address],[address] for records-sent to scan stat/scanning

## 2018-10-29 ENCOUNTER — HOSPITAL ENCOUNTER (INPATIENT)
Facility: HOSPITAL | Age: 83
LOS: 3 days | Discharge: HOME HEALTH | DRG: 190 | End: 2018-11-02
Attending: EMERGENCY MEDICINE | Admitting: HOSPITALIST
Payer: MEDICARE

## 2018-10-29 ENCOUNTER — APPOINTMENT (OUTPATIENT)
Dept: GENERAL RADIOLOGY | Facility: HOSPITAL | Age: 83
DRG: 190 | End: 2018-10-29
Attending: EMERGENCY MEDICINE
Payer: MEDICARE

## 2018-10-29 DIAGNOSIS — R11.0 NAUSEA: ICD-10-CM

## 2018-10-29 DIAGNOSIS — E11.40 NEUROPATHY DUE TO TYPE 2 DIABETES MELLITUS (HCC): ICD-10-CM

## 2018-10-29 DIAGNOSIS — F11.20 CHRONIC NARCOTIC DEPENDENCE (HCC): ICD-10-CM

## 2018-10-29 DIAGNOSIS — J44.1 COPD EXACERBATION (HCC): Primary | ICD-10-CM

## 2018-10-29 PROCEDURE — 99223 1ST HOSP IP/OBS HIGH 75: CPT | Performed by: HOSPITALIST

## 2018-10-29 PROCEDURE — 71045 X-RAY EXAM CHEST 1 VIEW: CPT | Performed by: EMERGENCY MEDICINE

## 2018-10-29 RX ORDER — IPRATROPIUM BROMIDE AND ALBUTEROL SULFATE 2.5; .5 MG/3ML; MG/3ML
3 SOLUTION RESPIRATORY (INHALATION)
Status: DISCONTINUED | OUTPATIENT
Start: 2018-10-29 | End: 2018-10-29

## 2018-10-29 RX ORDER — CETIRIZINE HYDROCHLORIDE 10 MG/1
10 TABLET ORAL DAILY
Status: DISCONTINUED | OUTPATIENT
Start: 2018-10-30 | End: 2018-10-30

## 2018-10-29 RX ORDER — DEXTROSE MONOHYDRATE 25 G/50ML
50 INJECTION, SOLUTION INTRAVENOUS
Status: DISCONTINUED | OUTPATIENT
Start: 2018-10-29 | End: 2018-11-02

## 2018-10-29 RX ORDER — ONDANSETRON 2 MG/ML
4 INJECTION INTRAMUSCULAR; INTRAVENOUS EVERY 6 HOURS PRN
Status: DISCONTINUED | OUTPATIENT
Start: 2018-10-29 | End: 2018-11-02

## 2018-10-29 RX ORDER — ACETAMINOPHEN 160 MG/5ML
650 SOLUTION ORAL EVERY 6 HOURS PRN
Status: DISCONTINUED | OUTPATIENT
Start: 2018-10-29 | End: 2018-11-02

## 2018-10-29 RX ORDER — ENOXAPARIN SODIUM 100 MG/ML
30 INJECTION SUBCUTANEOUS DAILY
Status: DISCONTINUED | OUTPATIENT
Start: 2018-10-30 | End: 2018-10-30

## 2018-10-29 RX ORDER — DULOXETIN HYDROCHLORIDE 30 MG/1
60 CAPSULE, DELAYED RELEASE ORAL DAILY
Status: DISCONTINUED | OUTPATIENT
Start: 2018-10-30 | End: 2018-11-02

## 2018-10-29 RX ORDER — SODIUM CHLORIDE 9 MG/ML
INJECTION, SOLUTION INTRAVENOUS CONTINUOUS
Status: ACTIVE | OUTPATIENT
Start: 2018-10-29 | End: 2018-10-30

## 2018-10-29 RX ORDER — METOCLOPRAMIDE HYDROCHLORIDE 5 MG/ML
5 INJECTION INTRAMUSCULAR; INTRAVENOUS EVERY 8 HOURS PRN
Status: DISCONTINUED | OUTPATIENT
Start: 2018-10-29 | End: 2018-11-02

## 2018-10-29 RX ORDER — METHYLPREDNISOLONE SODIUM SUCCINATE 125 MG/2ML
125 INJECTION, POWDER, LYOPHILIZED, FOR SOLUTION INTRAMUSCULAR; INTRAVENOUS ONCE
Status: COMPLETED | OUTPATIENT
Start: 2018-10-29 | End: 2018-10-29

## 2018-10-29 RX ORDER — ATENOLOL 25 MG/1
12.5 TABLET ORAL DAILY
Status: ON HOLD | COMMUNITY
End: 2019-01-01

## 2018-10-29 RX ORDER — ONDANSETRON 2 MG/ML
4 INJECTION INTRAMUSCULAR; INTRAVENOUS ONCE
Status: COMPLETED | OUTPATIENT
Start: 2018-10-29 | End: 2018-10-29

## 2018-10-29 RX ORDER — IPRATROPIUM BROMIDE AND ALBUTEROL SULFATE 2.5; .5 MG/3ML; MG/3ML
3 SOLUTION RESPIRATORY (INHALATION) EVERY 4 HOURS
Status: DISCONTINUED | OUTPATIENT
Start: 2018-10-29 | End: 2018-11-02

## 2018-10-29 RX ORDER — HYDROCODONE BITARTRATE AND ACETAMINOPHEN 5; 325 MG/1; MG/1
1 TABLET ORAL EVERY 8 HOURS PRN
Status: DISCONTINUED | OUTPATIENT
Start: 2018-10-29 | End: 2018-11-02

## 2018-10-29 RX ORDER — METHYLPREDNISOLONE SODIUM SUCCINATE 40 MG/ML
40 INJECTION, POWDER, LYOPHILIZED, FOR SOLUTION INTRAMUSCULAR; INTRAVENOUS EVERY 6 HOURS
Status: DISCONTINUED | OUTPATIENT
Start: 2018-10-30 | End: 2018-10-31

## 2018-10-29 NOTE — TELEPHONE ENCOUNTER
Pt daughter called stating the at home MD will not be comgin to see her until 11/5-wants to know if TB will giver her another refill of Hydrocodone-Acetaminophen 5-300 MG Oral to hold her over until then? ? Call to advise

## 2018-10-30 ENCOUNTER — TELEPHONE (OUTPATIENT)
Dept: INTERNAL MEDICINE CLINIC | Facility: CLINIC | Age: 83
End: 2018-10-30

## 2018-10-30 ENCOUNTER — APPOINTMENT (OUTPATIENT)
Dept: GENERAL RADIOLOGY | Facility: HOSPITAL | Age: 83
DRG: 190 | End: 2018-10-30
Attending: INTERNAL MEDICINE
Payer: MEDICARE

## 2018-10-30 PROCEDURE — 74018 RADEX ABDOMEN 1 VIEW: CPT | Performed by: INTERNAL MEDICINE

## 2018-10-30 PROCEDURE — 99232 SBSQ HOSP IP/OBS MODERATE 35: CPT | Performed by: INTERNAL MEDICINE

## 2018-10-30 RX ORDER — HYDROCODONE BITARTRATE AND ACETAMINOPHEN 5; 300 MG/1; MG/1
1 TABLET ORAL EVERY 8 HOURS PRN
Qty: 30 TABLET | Refills: 0 | Status: ON HOLD | OUTPATIENT
Start: 2018-10-30 | End: 2019-01-01

## 2018-10-30 RX ORDER — CETIRIZINE HYDROCHLORIDE 5 MG/1
5 TABLET ORAL DAILY
Status: DISCONTINUED | OUTPATIENT
Start: 2018-10-31 | End: 2018-11-02

## 2018-10-30 RX ORDER — AZITHROMYCIN 250 MG/1
250 TABLET, FILM COATED ORAL DAILY
Status: COMPLETED | OUTPATIENT
Start: 2018-10-30 | End: 2018-11-02

## 2018-10-30 RX ORDER — POLYVINYL ALCOHOL 14 MG/ML
1 SOLUTION/ DROPS OPHTHALMIC 4 TIMES DAILY PRN
Status: DISCONTINUED | OUTPATIENT
Start: 2018-10-30 | End: 2018-11-02

## 2018-10-30 RX ORDER — BUDESONIDE AND FORMOTEROL FUMARATE DIHYDRATE 160; 4.5 UG/1; UG/1
2 AEROSOL RESPIRATORY (INHALATION) 2 TIMES DAILY
Status: DISCONTINUED | OUTPATIENT
Start: 2018-10-30 | End: 2018-11-02

## 2018-10-30 RX ORDER — SODIUM CHLORIDE 9 MG/ML
INJECTION, SOLUTION INTRAVENOUS CONTINUOUS
Status: DISCONTINUED | OUTPATIENT
Start: 2018-10-30 | End: 2018-10-31

## 2018-10-30 RX ORDER — WARFARIN SODIUM 5 MG/1
5 TABLET ORAL
Status: COMPLETED | OUTPATIENT
Start: 2018-10-30 | End: 2018-10-30

## 2018-10-30 RX ORDER — WARFARIN SODIUM 5 MG/1
5 TABLET ORAL ONCE
Status: COMPLETED | OUTPATIENT
Start: 2018-10-30 | End: 2018-10-30

## 2018-10-30 RX ORDER — ALBUTEROL SULFATE 2.5 MG/3ML
2.5 SOLUTION RESPIRATORY (INHALATION) EVERY 2 HOUR PRN
Status: DISCONTINUED | OUTPATIENT
Start: 2018-10-30 | End: 2018-11-02

## 2018-10-30 NOTE — PROGRESS NOTES
[facility] Dosing Service  Warfarin (Coumadin) Subsequent Dosing    Allison Joseph is a 80year old female for whom pharmacy has been dosing warfarin (Coumadin) for Afib.  Goal INR is 2-3    Recent Labs   Lab  10/29/18   1204  10/30/18   0648   INR  2.3

## 2018-10-30 NOTE — PHYSICAL THERAPY NOTE
PHYSICAL THERAPY EVALUATION - INPATIENT     Room Number: 522/522-A  Evaluation Date: 10/30/2018  Type of Evaluation: Initial  Physician Order: PT Eval and Treat    Presenting Problem: COPD exacerbation  Reason for Therapy: Mobility Dysfunction and Aidan Cipro nodule of 1.6 cm with microcalcifications .  Biopsy of all three benign   • Osteoarthrosis, unspecified whether generalized or localized, unspecified site    • Pacemaker     Pacemaker placed in 2004 at [facility]   • PONV (postoperative nausea and vomiting) PAIN ASSESSMENT  Ratin          COGNITION  · Overall Cognitive Status:  WFL - within functional limits  · Orientation Level:  oriented to place, oriented to time, oriented to situation and oriented to person  · Following Commands:  follows one step CJ    FUNCTIONAL ABILITY STATUS  Gait Assessment   Gait Assistance: Dependent assistance  Distance (ft): 2  Assistive Device: None(HHA)  Pattern: Shuffle  Stoop/Curb Assistance: Not tested  Comment : Above FIM scores are defined per dept policy    Skilled completed include The AM-PAC '6-Clicks' Inpatient Basic Mobility Short Form was completed and this patient is demonstrating a 35.83% degree of impairment in mobility.  Research supports that patients with this level of impairment may benefit from DC to home

## 2018-10-30 NOTE — ED NOTES
Plan of care discussed. Family remains at the bedside. Patient continues to c/o nausea and SOB.  Respirations easy and regular, no emesis

## 2018-10-30 NOTE — H&P
[facility]  History and Physical     Boone Donaldson Patient Status:  Emergency    1928 MRN [id number]   Location [facility] Attending Gloria Zavala MD   Hosp Day # 0 PCP Tran Roberts MD     Chief Complaint: Pipe Book standing problem   • Nontoxic multinodular goiter     Dx in 6/2013 - thyroid U/S showed right upper nodule of 1.1 cm, right midpole nodule of 3.2 cm and left inferior nodule of 1.6 cm with microcalcifications .  Biopsy of all three benign   • Osteoarthrosis nodules   • Breast Cancer Self 55       Allergies:    Ace Inhibitors          UNKNOWN  Allergy                 RASH    Comment:Occurs sometimes  Darvon [Bexophene]      RASH  Iv Dye [Radiology C*        Comment:Unsure reaction  Msg [Monosodium Glu*    OTHER (two) times daily. Disp:  Rfl:        Review of Systems:   A comprehensive 14 point review of systems was completed. Pertinent positives and negatives noted in the HPI.     Physical Exam:    /82   Pulse 59   Temp 97.6 °F (36.4 °C) (Temporal)   Resp 3. A1c    Quality:  · DVT Prophylaxis: Coumadin    Plan of care discussed with patient, family and ER.      Gita Olson MD  10/29/2018

## 2018-10-30 NOTE — PLAN OF CARE
Patient/Family Goals    • Patient/Family Long Term Goal Progressing    • Patient/Family Short Term Goal Progressing        RESPIRATORY - ADULT    • Achieves optimal ventilation and oxygenation Progressing            Pt aox3, forgetful.  Maintaining O2 sats III ([facility] [address]     History of left breast cancer     Chronic atrial fibrillation (HCC)     Malignant neoplasm of left lung (HCC)     Anxiety     COPD exacerbation (HCC)     Chronic respiratory insufficiency     Type 2 diabetes mellitus with complication, withou

## 2018-10-30 NOTE — OCCUPATIONAL THERAPY NOTE
OCCUPATIONAL THERAPY QUICK EVALUATION - INPATIENT    Room Number: 522/522-A  Evaluation Date: 10/30/2018     Type of Evaluation: Initial  Presenting Problem: COPD    Physician Order: IP Consult to Occupational Therapy  Reason for Therapy:  ADL/IADL Dysfunc upper nodule of 1.1 cm, right midpole nodule of 3.2 cm and left inferior nodule of 1.6 cm with microcalcifications .  Biopsy of all three benign   • Osteoarthrosis, unspecified whether generalized or localized, unspecified site    • Pacemaker     Pacemaker home    OBJECTIVE     Fall Risk: Standard fall risk    WEIGHT BEARING RESTRICTION  Weight Bearing Restriction: None                PAIN ASSESSMENT  Ratin  Location: no pain a this time       COGNITION  WNL    RANGE OF MOTION AND STRENGTH ASSESSMENT  Up performed all ADL tasks, functional transfers, dynamic reaching and functional mobility safely, without loss of balance, and at supervision level or above. Patient reports no further questions or concerns about safe return to I/ADL tasks.  No further OT ser

## 2018-10-30 NOTE — CONSULTS
[facility]  Report of Consultation    Lenin Newton Patient Status:  Inpatient    1928 MRN [id number]   [facility] 5NW-A Attending Allison Zacarias MD   Hosp Day # 0 PCP Sawyer Sullivan MD     Reason for Consultation:  hyperthyroid hypertension    • Fitting and adjustment of cardiac pacemaker    • HIGH BLOOD PRESSURE    • Hypertension    • Lung cancer ([facility] [address]    • Lung nodule     CT scan of chest from 8/2013 - Right lung upper lobe mass measuring 1.2 cm   • Macular degeneration     Rec • Thyroid Disorder Other         Niece with benign thyroid nodules   • Breast Cancer Self 55      reports that she quit smoking about 20 years ago. She has a 104.00 pack-year smoking history.  she has never used smokeless tobacco. She reports that she ribera liquid 15 g, 15 g, Oral, Q15 Min PRN **OR** Glucose-Vitamin C (DEX-4) 4-6 GM-MG chewable tab 4 tablet, 4 tablet, Oral, Q15 Min PRN **OR** dextrose 50 % injection 50 mL, 50 mL, Intravenous, Q15 Min PRN **OR** glucose (DEX4) oral liquid 30 g, 30 g, Oral, Q15 10/29/2018    TP 6.9 10/29/2018    AST 38 10/29/2018    ALT 20 10/29/2018    PTT 44.5 10/29/2018    INR 2.39 10/30/2018    PTP 26.9 10/30/2018    T4F 1.0 10/30/2018    TSH 0.005 10/30/2018    ESRML 20 10/30/2018    CRP <0.29 10/30/2018    MG 2.2 10/30/2018 ([facility] [address]     Malignant neoplasm of lung ([facility] [address]     Hyponatremia     Anemia of chronic disease     Renal insufficiency     Subtherapeutic international normalized ratio (INR)     Closed fracture of distal end of right radius with routine healing, unspecified fr dinero-reyes type rash, and hepatotoxicity. Signs and symptoms of these have been discussed with patient and if present patient would discontinue medication and give the office a call. 1. CBC and CMP reviewed and noted to be normal  2.  I recommended

## 2018-10-30 NOTE — ED PROVIDER NOTES
Patient Seen in: [facility] Emergency Department    History   Patient presents with:  Dyspnea BETTY SOB (respiratory)  Nausea/Vomiting/Diarrhea (gastrointestinal)    Stated Complaint: BETTY, nausea     HPI    41-year-old female presents emergency depart unspecified whether generalized or localized, unspecified site    • Pacemaker     Pacemaker placed in 2004 at [facility]   • PONV (postoperative nausea and vomiting)    • Right adrenal mass ([facility] [address]     Dx in 2011: right adrenal mass measuring between 1 and 1.5 t Current:/82   Pulse 59   Temp 97.6 °F (36.4 °C) (Temporal)   Resp 22   Wt 59 kg   SpO2 100%   BMI 20.98 kg/m²         Physical Exam    Vital signs reviewed  General appearance: Patient is alert and in mild respiratory distress  HEENT: Pupils eq Narrative: The following orders were created for panel order CBC WITH DIFFERENTIAL WITH PLATELET.   Procedure                               Abnormality         Status                     ---------                               -----------         ------ 10:26 pm    Follow-up:  No follow-up provider specified.       Medications Prescribed:  Current Discharge Medication List        Present on Admission  Date Reviewed: 5/11/2018          ICD-10-CM Noted POA    COPD exacerbation ([facility] [address] J44.1 9/30/2016 Unknown

## 2018-10-30 NOTE — PROGRESS NOTES
NURSING ADMISSION NOTE      Patient admitted via Cart  Oriented to room. Safety precautions initiated. Bed in low position. Call light in reach. Pt a&ox3. Admitted from home with daughter for increasing SOB- dx COPD ex.  Admission navigator comple

## 2018-10-30 NOTE — PROGRESS NOTES
[facility] Pharmacy Note:  Renal Dose Adjustment for Cetirizine (ZYRTEC)    Alexandria Jason has been prescribed Cetirizine (Zyrtec) 10 mg orally daily. Estimated Creatinine Clearance: 22.8 mL/min (A) (based on SCr of 1.41 mg/dL (H)).     Her calculated creatin

## 2018-10-30 NOTE — PROGRESS NOTES
[facility]  Progress Note     Omar Peers Patient Status:  Inpatient    1928 MRN [id number]   [facility] 5NW-A Attending Steven Rice MD   Hosp Day # 0 PCP Aubrie Aguirre MD     Chief Complaint: SOB    S: Patient feels S Daily   • Budesonide-Formoterol Fumarate  2 puff Inhalation BID   • azithromycin  250 mg Oral Daily   • DULoxetine HCl  60 mg Oral Daily   • ipratropium-albuterol  3 mL Nebulization Q4H   • MethylPREDNISolone Sodium Succ  40 mg Intravenous Q6H   • cefTRIAX

## 2018-10-30 NOTE — PROGRESS NOTES
[facility] Dosing Service  Warfarin (Coumadin) Initial Dosing    Flavia Cedillo is a 80year old female for whom pharmacy has been consulted to dose warfarin (COUMADIN) for atrial fibrillation by Dr. Franki Connor. Based on this indication, goal INR is 2-3.

## 2018-10-30 NOTE — CM/SW NOTE
10/30/18 1500   CM/SW Referral Data   Referral Source Physician   Reason for Referral Discharge planning   Informant Patient; Children   Social History   Recreational Drug/Alcohol Use no   Major Changes Last 6 Months no   Domestic/Partner Violence no   S

## 2018-10-30 NOTE — CONSULTS
[facility]  Report of Consultation    [facility] Patient Status:  Observation    1928 MRN [id number]   [facility] 5NW-A Attending Daniel Barbosa MD   Hosp Day # 0 PCP Cherry Moeller MD     Reason for Consultation: Maday Nicole Problem Relation Age of Onset   • Thyroid Disorder Daughter         Thyroid cancer - thyroidectomy but no MILES   • Cancer Daughter         Thyroid cancer - tx with thyroidectomy but no MILES   • Thyroid Disorder Other         Niece with benign thyroid nodul 2.5mg on Sunday Disp:  Rfl:  7/22/2018 at 0900   Budesonide-Formoterol Fumarate (SYMBICORT) 160-4.5 MCG/ACT Inhalation Aerosol Inhale 2 puffs into the lungs 2 (two) times daily.  Disp:  Rfl:  More than a month at Unknown time     Review of Systems: The sreedhar GLU  142*  291*   BUN  27*  26*   CREATSERUM  1.35*  1.41*   GFRAA  40*  38*   GFRNAA  35*  33*   CA  8.8  8.6   NA  139  140   K  5.7*  4.3   CL  106  106   CO2  28.0  24.0     Recent Labs   Lab  10/30/18   0724   ABGPHT  7.39   PGEAIR4O  39   OAHYM3L

## 2018-10-30 NOTE — TELEPHONE ENCOUNTER
We received medical record request from [address] [place] [address],[address] Floor for records-see request-no DOS-sent to scan stat and scanning

## 2018-10-31 PROCEDURE — 99232 SBSQ HOSP IP/OBS MODERATE 35: CPT | Performed by: HOSPITALIST

## 2018-10-31 RX ORDER — METHIMAZOLE 5 MG/1
2.5 TABLET ORAL DAILY
Status: DISCONTINUED | OUTPATIENT
Start: 2018-10-31 | End: 2018-11-02

## 2018-10-31 RX ORDER — METHIMAZOLE 5 MG/1
2.5 TABLET ORAL DAILY
Qty: 15 TABLET | Refills: 0 | Status: ON HOLD | OUTPATIENT
Start: 2018-10-31 | End: 2019-01-01

## 2018-10-31 NOTE — PLAN OF CARE
Impaired Activities of Daily Living    • Achieve highest/safest level of independence in self care Progressing        Impaired Functional Mobility    • Achieve highest/safest level of mobility/gait Progressing        Patient/Family Goals    • Patient/Famil Diabetes mellitus type 2 in nonobese (HCC)     At risk for falling     CKD (chronic kidney disease), stage III (HCC)     History of left breast cancer     Chronic atrial fibrillation (HCC)     Malignant neoplasm of left lung (HCC)     Anxiety     COPD exac

## 2018-10-31 NOTE — PROGRESS NOTES
[facility]  Progress Note    Krista Jimenez Patient Status:  Inpatient    1928 MRN [id number]   [facility] 5NW-A Attending Yeimi Washington MD   Hosp Day # 1 PCP Carroll Gorman MD     Subjective:  Krista Jimenez is a(n) 80 year o 10.6*  9.2*   HCT  38.2  33.8*  29.4*   MCV  91.6  92.3  92.2   MCH  29.3  29.0  28.8   MCHC  31.9  31.4  31.3   RDW  14.4  14.2  14.6   NEPRELIM  4.17  3.80   --    WBC  7.5  4.3  7.7   PLT  220.0  170.0  151.0     Recent Labs   Lab  10/29/18   5059  10/

## 2018-10-31 NOTE — PROGRESS NOTES
[facility]  Progress Note    [facility] Patient Status:  Inpatient    1928 MRN [id number]   [facility] 5NW-A Attending Ava Win MD   Hosp Day # 1 PCP Cherry Moeller MD         Subjective:  No new complaints    Objective Value Ref Range    Glucose 204 (H) 70 - 99 mg/dL    Sodium 142 136 - 144 mmol/L    Potassium 4.0 3.6 - 5.1 mmol/L    Chloride 111 101 - 111 mmol/L    CO2 24.0 22.0 - 32.0 mmol/L    BUN 21 (H) 8 - 20 mg/dL    Creatinine 1.09 (H) 0.55 - 1.02 mg/dL    Calcium of left lung ([address] [address]     Anxiety     COPD exacerbation (HCC)     Chronic respiratory insufficiency     Type 2 diabetes mellitus with complication, without long-term current use of insulin (HCC)     Malignant neoplasm of female breast (HCC)     Malignant neop recommended option would be low dose methimazole - in patients with mild disease 50-75% will have medical remission within 12-18 months of initiation of therapy.  We discussed risks of the medication which include agranulocytosis, a dinero-reyes type sav

## 2018-10-31 NOTE — PROGRESS NOTES
[facility]  Progress Note     Caro Parr Patient Status:  Inpatient    1928 MRN [id number]   [facility] 5NW-A Attending Mann Gorman MD   Hosp Day # 1 PCP Tea Villa MD     Chief Complaint: SOB    S: Patient SOB imp Epic.    Medications:   • predniSONE  60 mg Oral Daily with breakfast   • cetirizine  5 mg Oral Daily   • Budesonide-Formoterol Fumarate  2 puff Inhalation BID   • azithromycin  250 mg Oral Daily   • insulin detemir  8 Units Subcutaneous [email or web address]   •

## 2018-10-31 NOTE — PROGRESS NOTES
[facility] Dosing Service  Warfarin (Coumadin) Subsequent Dosing    Lovely Bates is a 80year old female for whom pharmacy has been dosing warfarin (Coumadin) for A fib by Dr. Joceline Lopez. Goal INR is 2-3.     Recent Labs   Lab  10/29/18   8467  10/30/18

## 2018-11-01 PROCEDURE — 99232 SBSQ HOSP IP/OBS MODERATE 35: CPT | Performed by: HOSPITALIST

## 2018-11-01 NOTE — PROGRESS NOTES
Pt is a 81 y/o female admitted with acute resp failure due to COPD exacerbation. alert oriented but forgetful. on po steroids. back to baseline 02.pt is on 2L02 via nc.02 sats 90-93%. pts INR is 5.77. MD notified. pt denies SOB,pain,fever,N/V.up as tolerated. jeremias

## 2018-11-01 NOTE — PHYSICAL THERAPY NOTE
PHYSICAL THERAPY TREATMENT NOTE - INPATIENT    Room Number: 522/522-A     Session: 1   Number of Visits to Meet Established Goals: 3    Presenting Problem: COPD exacerbation  Reason for Therapy: Mobility Dysfunction and Discharge Planning     History rela U/S showed right upper nodule of 1.1 cm, right midpole nodule of 3.2 cm and left inferior nodule of 1.6 cm with microcalcifications .  Biopsy of all three benign   • Osteoarthrosis, unspecified whether generalized or localized, unspecified site    • Pacemak 93  Ambulation oxygen flow (liters per minute): 2        AM-PAC '6-Clicks' INPATIENT SHORT FORM - BASIC MOBILITY  How much difficulty does the patient currently have. ..  -   Turning over in bed (including adjusting bedclothes, sheets and blankets)?: None in order to advance her to her PLOF. DISCHARGE RECOMMENDATIONS  PT Discharge Recommendations: Home with home health PT     PLAN  PT Treatment Plan: Bed mobility; Body mechanics; Endurance; Energy conservation;Patient education; Family education;Range of mo

## 2018-11-01 NOTE — CM/SW NOTE
Spoke with Dr Terrance Melissa who anticipates discharge today and stated pt will need PT/INR tomorrow and Sunday. Contacted [place] [phone] ([phone]) and spoke with Tim Pina who stated pt is not current with them as their services completed on 9/26.   They will i

## 2018-11-01 NOTE — PROGRESS NOTES
[facility]  Progress Note     Lendell Moment Patient Status:  Inpatient    1928 MRN [id number]   [facility] 5NW-A Attending Christiano Aguilar MD   Hosp Day # 2 PCP Rhiannon Ny MD     Chief Complaint: SOB    S: Patient SOB imp 26.9*  40.7*  53.5*   INR  2.39*  4.07*  5.77*       Recent Labs   Lab  10/29/18   2133   TROP  <0.046            Imaging: Imaging data reviewed in Epic.     Medications:   • predniSONE  60 mg Oral Daily with breakfast   • methimazole  2.5 mg Oral Daily   •

## 2018-11-01 NOTE — PLAN OF CARE
Problem: Patient/Family Goals  Goal: Patient/Family Short Term Goal  Patient's Short Term Goal: 10/30 Admission: to breathe easier  10/30AM: stop having pain  10/30 PM:Sleep  10/31AM: sleep  11/1-resolve copd exacerbation  Interventions:   11/1-02 therapy,

## 2018-11-01 NOTE — CM/SW NOTE
Unable to deliver bpci information d/t patient nausea/vomiting. Will return to deliver bpci at another time.

## 2018-11-01 NOTE — PROGRESS NOTES
Pharmacy Dosing Service: Warfarin (Coumadin)  Krista Jimenez is a 80year old female with a history of afib (on outpatient coumadin) for whom pharmacy has been dosing warfarin (Coumadin) per consult from Dr. Vinnie Bill on 10/30/18.  Goal INR is 2-3    Recent

## 2018-11-01 NOTE — PROGRESS NOTES
[facility]  Progress Note    Arcadio Gomez Patient Status:  Inpatient    1928 MRN [id number]   [facility] 5NW-A Attending Jazmyn Miranda MD   Hosp Day # 2 PCP Hermelindo Coyne MD     Subjective:  Arcadio Gomez is a(n) 80 year o 92.3  92.2  92.4   MCH  29.3  29.0  28.8  29.7   MCHC  31.9  31.4  31.3  32.1   RDW  14.4  14.2  14.6  15.1   NEPRELIM  4.17  3.80   --   10.71*   WBC  7.5  4.3  7.7  12.8   PLT  220.0  170.0  151.0  164.0     Recent Labs   Lab  10/30/18   0648  10/31/18

## 2018-11-01 NOTE — PLAN OF CARE
Impaired Activities of Daily Living    • Achieve highest/safest level of independence in self care Not Progressing        Impaired Functional Mobility    • Achieve highest/safest level of mobility/gait Not Progressing        Patient/Family Goals    • Deepae

## 2018-11-02 ENCOUNTER — APPOINTMENT (OUTPATIENT)
Dept: GENERAL RADIOLOGY | Facility: HOSPITAL | Age: 83
DRG: 190 | End: 2018-11-02
Attending: HOSPITALIST
Payer: MEDICARE

## 2018-11-02 VITALS
OXYGEN SATURATION: 96 % | HEART RATE: 85 BPM | WEIGHT: 121.25 LBS | TEMPERATURE: 98 F | BODY MASS INDEX: 20 KG/M2 | SYSTOLIC BLOOD PRESSURE: 136 MMHG | DIASTOLIC BLOOD PRESSURE: 67 MMHG | RESPIRATION RATE: 18 BRPM

## 2018-11-02 PROCEDURE — 99239 HOSP IP/OBS DSCHRG MGMT >30: CPT | Performed by: HOSPITALIST

## 2018-11-02 PROCEDURE — 73562 X-RAY EXAM OF KNEE 3: CPT | Performed by: HOSPITALIST

## 2018-11-02 PROCEDURE — 73523 X-RAY EXAM HIPS BI 5/> VIEWS: CPT | Performed by: HOSPITALIST

## 2018-11-02 RX ORDER — PREDNISONE 10 MG/1
TABLET ORAL
Qty: 37 TABLET | Refills: 0 | Status: ON HOLD | OUTPATIENT
Start: 2018-11-02 | End: 2019-01-01

## 2018-11-02 RX ORDER — IPRATROPIUM BROMIDE AND ALBUTEROL SULFATE 2.5; .5 MG/3ML; MG/3ML
3 SOLUTION RESPIRATORY (INHALATION)
Qty: 240 ML | Refills: 1 | Status: ON HOLD | OUTPATIENT
Start: 2018-11-02 | End: 2019-01-01

## 2018-11-02 RX ORDER — HYDROCODONE BITARTRATE AND ACETAMINOPHEN 5; 300 MG/1; MG/1
1 TABLET ORAL EVERY 8 HOURS PRN
Qty: 30 TABLET | Refills: 0 | Status: ON HOLD | OUTPATIENT
Start: 2018-11-02 | End: 2019-01-01 | Stop reason: CLARIF

## 2018-11-02 NOTE — PROGRESS NOTES
[facility]  Progress Note    Lidya Morfin Patient Status:  Inpatient    1928 MRN [id number]   [facility] 5NW-A Attending Tedd Lesch, MD   [address] Day # 3 PCP Mason Forde MD     Subjective:  Lidya Morfin is a(n) 80 year o classified     Unspecified venous (peripheral) insufficiency     Macular degeneration (senile) of retina, unspecified     Benign neoplasm of adrenal gland     Pure hypercholesterolemia     Diverticulosis of colon (without mention of hemorrhage)     Type 2 long-term current use of insulin (HCC)     Atrial fibrillation, unspecified type ([facility]     Community acquired bacterial pneumonia     Chronic narcotic dependence (HCC)     Nausea vomiting and diarrhea     Dehydration     Anemia     Azotemia     Hyperglycem

## 2018-11-02 NOTE — PHYSICAL THERAPY NOTE
PHYSICAL THERAPY TREATMENT NOTE - INPATIENT    Room Number: 522/522-A     Session: 2   Number of Visits to Meet Established Goals: 3    Presenting Problem: COPD exacerbation    History related to current admission:  Pt admitted on 10/29/18 secondary to [facility] nodule of 1.1 cm, right midpole nodule of 3.2 cm and left inferior nodule of 1.6 cm with microcalcifications .  Biopsy of all three benign   • Osteoarthrosis, unspecified whether generalized or localized, unspecified site    • Pacemaker     Pacemaker placed - BASIC MOBILITY  How much difficulty does the patient currently have. ..  -   Turning over in bed (including adjusting bedclothes, sheets and blankets)?: None   -   Sitting down on and standing up from a chair with arms (e.g., wheelchair, bedside commode, Recommendations: Home with home health PT     PLAN  PT Treatment Plan: Bed mobility; Body mechanics; Endurance; Energy conservation;Patient education; Family education;Range of motion;Strengthening;Stair training;Transfer training;Balance training  Rehab Susan

## 2018-11-02 NOTE — CONSULTS
[facility] Dosing Service  Warfarin (Coumadin) Subsequent Dosing    Tashia Munoz is a 80year old female for whom pharmacy has been dosing warfarin (Coumadin).  Goal INR is 2-3    Recent Labs   Lab  10/29/18   2133  10/30/18   0648  10/31/18   0639  1

## 2018-11-02 NOTE — PROGRESS NOTES
[facility]  Progress Note     Marisel García Patient Status:  Inpatient    1928 MRN [id number]   [facility] 5NW-A Attending Stevo Mendes MD   Hosp Day # 3 PCP Kaden España MD     Chief Complaint: SOB    S: Patient SOB bet --    --    --    BILT  0.4   --    --    --    --    TP  6.9   --    --    --    --     < > = values in this interval not displayed. Estimated Creatinine Clearance: 26.8 mL/min (A) (based on SCr of 1.21 mg/dL (H)).     Recent Labs   Lab  10/31/18   0 FULL- d.w and daughter   · [place]ell: none  · Central line: none    Estimated date of discharge: today  Discharge is dependent on: can DC home today if XR's negative  At this point Ms. Roger Lundy is expected to be discharge to: home w/ [facility] discussed

## 2018-11-02 NOTE — CM/SW NOTE
Care Management/BPCI:    Met with patient and daughter at bedside to explain the BPCI/Medicare program. Patient and daughter agreed with phone f/u for 3 months from [address] [place] [address] after discharge from [facility]. Patient was enrolled under .  BPCI/M

## 2018-11-03 NOTE — DISCHARGE SUMMARY
[facility] [place] HOSPITALIST  DISCHARGE SUMMARY     Marisel García Patient Status:  Inpatient    1928 MRN [id number]   [facility] 5NW-A Attending No att. providers found   Hosp Day # 3 PCP Kaden España MD     Date of Admission: 10/29/2018  Da not use her nebulizer all the time and did not have nebulized medication for treatment at home. She increased the oxygen - unclear to how many liters and presented to the ER. No chest pain. Admits to nausea. No vomiting. Last BM today. (+) PO intake.  Chron medications      Instructions Prescription details   Albuterol Sulfate  (90 Base) MCG/ACT Aers  Commonly known as:  PROAIR HFA      Inhale 2 puffs into the lungs every 4 to 6 hours as needed.    Quantity:  8 g  Refills:  1     atenolol 25 MG Tabs  Co nurse    Bring a paper prescription for each of these medications  · Hydrocodone-Acetaminophen 5-300 MG Tabs  · Hydrocodone-Acetaminophen 5-300 MG Tabs  · predniSONE 10 MG Tabs         [place]    Follow-up appointment:   Alea Garcia MD  48

## 2018-11-05 ENCOUNTER — PATIENT OUTREACH (OUTPATIENT)
Dept: CASE MANAGEMENT | Age: 83
End: 2018-11-05

## 2018-11-05 ENCOUNTER — TELEPHONE (OUTPATIENT)
Dept: INTERNAL MEDICINE CLINIC | Facility: CLINIC | Age: 83
End: 2018-11-05

## 2018-11-05 DIAGNOSIS — J44.1 COPD EXACERBATION (HCC): ICD-10-CM

## 2018-11-05 DIAGNOSIS — Z02.9 ENCOUNTERS FOR ADMINISTRATIVE PURPOSE: ICD-10-CM

## 2018-11-05 PROCEDURE — 1111F DSCHRG MED/CURRENT MED MERGE: CPT

## 2018-11-05 NOTE — PROGRESS NOTES
Initial Post Discharge Follow Up   Discharge Date: 11/2/18  Contact Date: 11/5/2018    Consent Verification:  Assessment Completed With: Guerrero Valencia, daughter  HIPAA Verified?   Yes    Discharge Dx:   acute on chronic hypoxic resp failure, copd exacerbation, l nystatin 187572 UNIT/ML Mouth/Throat Suspension Take 5 mL (500,000 Units total) by mouth 4 (four) times daily. Disp: 140 mL Rfl: 1   methimazole 5 MG Oral Tab Take 0.5 tablets (2.5 mg total) by mouth daily.  Disp: 15 tablet Rfl: 0   Hydrocodone-Acetaminop pt daughter    Has HH been set up? Yes   If Yes: With Whom: Elmer Carrion (coordinated w/ PCP)    When: 11/5/18   DME ordered at D/C? No  Services ordered at D/C?   Yes   What services:   Rehab, CHF, Stroke, PT, OT, Spee Reviewed AVS summary with daughter. Call to PCP regarding HFU appt. Provided support and encouragement regarding pt healthcare.     [x]  Discharge Summary, Discharge medications reviewed/discussed/and reconciled with patient, and orders reviewed and discu

## 2018-11-05 NOTE — TELEPHONE ENCOUNTER
Calling to report pts INR. INR- 1.5    Needs a call back to inform alethea if pt should start her coumadin.  Pt was seen in the hospital 10/29 and D/C 11/2 and D/C orders advised to hold coumadin and to retest today     Please advise

## 2018-11-05 NOTE — TELEPHONE ENCOUNTER
Restart coumadin as inr too low, she has home MD starting tomorrow I think so further INR checks and monitoring per home MD.

## 2018-11-05 NOTE — TELEPHONE ENCOUNTER
Called and spoke with [facility] Bernardino Bautista to inform, per TB, restart coumadin as inr too low. Home MD starting tomorrow, so further INR checks and monitoring per home MD. Chandler Desouza verbalized understanding and agreed with POC.

## 2018-11-06 NOTE — CM/SW NOTE
11/05/18 2100   Discharge disposition   Expected discharge disposition Home-Health   Name of Facillity/Home Care/Hospice ([address] [facility])   Home services after discharge Skilled home care     Patient discharged 11/02/2018 as previously planned.

## 2019-01-01 ENCOUNTER — TELEPHONE (OUTPATIENT)
Dept: INTERNAL MEDICINE CLINIC | Facility: CLINIC | Age: 84
End: 2019-01-01

## 2019-01-01 ENCOUNTER — APPOINTMENT (OUTPATIENT)
Dept: GENERAL RADIOLOGY | Facility: HOSPITAL | Age: 84
End: 2019-01-01
Attending: PHYSICIAN ASSISTANT
Payer: MEDICARE

## 2019-01-01 ENCOUNTER — HOSPITAL ENCOUNTER (INPATIENT)
Facility: HOSPITAL | Age: 84
LOS: 3 days | Discharge: HOME HEALTH | DRG: 190 | End: 2019-01-01
Attending: EMERGENCY MEDICINE | Admitting: HOSPITALIST
Payer: MEDICARE

## 2019-01-01 ENCOUNTER — APPOINTMENT (OUTPATIENT)
Dept: GENERAL RADIOLOGY | Facility: HOSPITAL | Age: 84
DRG: 190 | End: 2019-01-01
Attending: HOSPITALIST
Payer: MEDICARE

## 2019-01-01 ENCOUNTER — APPOINTMENT (OUTPATIENT)
Dept: GENERAL RADIOLOGY | Facility: HOSPITAL | Age: 84
DRG: 536 | End: 2019-01-01
Attending: STUDENT IN AN ORGANIZED HEALTH CARE EDUCATION/TRAINING PROGRAM
Payer: MEDICARE

## 2019-01-01 ENCOUNTER — HOSPITAL ENCOUNTER (INPATIENT)
Facility: HOSPITAL | Age: 84
LOS: 3 days | Discharge: NURSING, REHAB OR LONG-TERM CARE | DRG: 536 | End: 2019-01-01
Attending: STUDENT IN AN ORGANIZED HEALTH CARE EDUCATION/TRAINING PROGRAM | Admitting: HOSPITALIST
Payer: MEDICARE

## 2019-01-01 ENCOUNTER — ANCILLARY PROCEDURE (OUTPATIENT)
Dept: CARDIOLOGY | Age: 84
End: 2019-01-01
Attending: INTERNAL MEDICINE

## 2019-01-01 ENCOUNTER — ANCILLARY ORDERS (OUTPATIENT)
Dept: CARDIOLOGY | Age: 84
End: 2019-01-01

## 2019-01-01 ENCOUNTER — HOSPITAL ENCOUNTER (EMERGENCY)
Facility: HOSPITAL | Age: 84
End: 2019-01-01
Attending: STUDENT IN AN ORGANIZED HEALTH CARE EDUCATION/TRAINING PROGRAM
Payer: MEDICARE

## 2019-01-01 ENCOUNTER — APPOINTMENT (OUTPATIENT)
Dept: GENERAL RADIOLOGY | Facility: HOSPITAL | Age: 84
DRG: 190 | End: 2019-01-01
Attending: EMERGENCY MEDICINE
Payer: MEDICARE

## 2019-01-01 ENCOUNTER — HOSPITAL ENCOUNTER (EMERGENCY)
Facility: HOSPITAL | Age: 84
Discharge: HOME | End: 2019-01-01
Attending: EMERGENCY MEDICINE
Payer: MEDICARE

## 2019-01-01 ENCOUNTER — EXTERNAL FACILITY (OUTPATIENT)
Dept: FAMILY MEDICINE CLINIC | Facility: CLINIC | Age: 84
End: 2019-01-01

## 2019-01-01 ENCOUNTER — APPOINTMENT (OUTPATIENT)
Dept: GENERAL RADIOLOGY | Facility: HOSPITAL | Age: 84
End: 2019-01-01
Attending: EMERGENCY MEDICINE
Payer: MEDICARE

## 2019-01-01 ENCOUNTER — HOSPITAL ENCOUNTER (OUTPATIENT)
Dept: PHYSICAL THERAPY | Facility: HOSPITAL | Age: 84
Setting detail: THERAPIES SERIES
Discharge: HOME | End: 2019-01-01
Payer: MEDICARE

## 2019-01-01 ENCOUNTER — TELEPHONE (OUTPATIENT)
Dept: CARDIOLOGY | Age: 84
End: 2019-01-01

## 2019-01-01 ENCOUNTER — APPOINTMENT (OUTPATIENT)
Dept: GENERAL RADIOLOGY | Facility: HOSPITAL | Age: 84
DRG: 189 | End: 2019-01-01
Payer: MEDICARE

## 2019-01-01 ENCOUNTER — LAB REQUISITION (OUTPATIENT)
Dept: LAB | Facility: HOSPITAL | Age: 84
End: 2019-01-01
Payer: MEDICARE

## 2019-01-01 ENCOUNTER — APPOINTMENT (OUTPATIENT)
Dept: CV DIAGNOSTICS | Facility: HOSPITAL | Age: 84
DRG: 190 | End: 2019-01-01
Attending: INTERNAL MEDICINE
Payer: MEDICARE

## 2019-01-01 ENCOUNTER — APPOINTMENT (OUTPATIENT)
Dept: CT IMAGING | Facility: HOSPITAL | Age: 84
End: 2019-01-01
Payer: MEDICARE

## 2019-01-01 ENCOUNTER — APPOINTMENT (OUTPATIENT)
Dept: CT IMAGING | Facility: HOSPITAL | Age: 84
DRG: 536 | End: 2019-01-01
Attending: STUDENT IN AN ORGANIZED HEALTH CARE EDUCATION/TRAINING PROGRAM
Payer: MEDICARE

## 2019-01-01 ENCOUNTER — HOSPITAL ENCOUNTER (INPATIENT)
Facility: HOSPITAL | Age: 84
LOS: 3 days | Discharge: HOME | DRG: 189 | End: 2019-01-01
Admitting: HOSPITALIST
Payer: MEDICARE

## 2019-01-01 VITALS
RESPIRATION RATE: 18 BRPM | HEIGHT: 65 IN | BODY MASS INDEX: 20.83 KG/M2 | HEART RATE: 66 BPM | DIASTOLIC BLOOD PRESSURE: 68 MMHG | OXYGEN SATURATION: 98 % | SYSTOLIC BLOOD PRESSURE: 131 MMHG | TEMPERATURE: 98 F | WEIGHT: 125 LBS

## 2019-01-01 VITALS
DIASTOLIC BLOOD PRESSURE: 62 MMHG | BODY MASS INDEX: 21 KG/M2 | WEIGHT: 130 LBS | TEMPERATURE: 98 F | OXYGEN SATURATION: 99 % | RESPIRATION RATE: 18 BRPM | SYSTOLIC BLOOD PRESSURE: 107 MMHG | HEART RATE: 58 BPM

## 2019-01-01 VITALS
DIASTOLIC BLOOD PRESSURE: 43 MMHG | BODY MASS INDEX: 20 KG/M2 | TEMPERATURE: 98 F | RESPIRATION RATE: 17 BRPM | HEART RATE: 66 BPM | OXYGEN SATURATION: 98 % | SYSTOLIC BLOOD PRESSURE: 118 MMHG | WEIGHT: 123.69 LBS

## 2019-01-01 VITALS
HEART RATE: 52 BPM | OXYGEN SATURATION: 94 % | HEIGHT: 65 IN | SYSTOLIC BLOOD PRESSURE: 100 MMHG | DIASTOLIC BLOOD PRESSURE: 52 MMHG

## 2019-01-01 VITALS
TEMPERATURE: 97 F | HEART RATE: 60 BPM | OXYGEN SATURATION: 96 % | HEIGHT: 63 IN | RESPIRATION RATE: 17 BRPM | BODY MASS INDEX: 22.27 KG/M2 | DIASTOLIC BLOOD PRESSURE: 67 MMHG | WEIGHT: 125.69 LBS | SYSTOLIC BLOOD PRESSURE: 140 MMHG

## 2019-01-01 VITALS
WEIGHT: 125.19 LBS | SYSTOLIC BLOOD PRESSURE: 126 MMHG | DIASTOLIC BLOOD PRESSURE: 64 MMHG | HEART RATE: 73 BPM | BODY MASS INDEX: 20 KG/M2 | OXYGEN SATURATION: 96 % | RESPIRATION RATE: 20 BRPM | TEMPERATURE: 99 F

## 2019-01-01 DIAGNOSIS — F29 PSYCHOSIS, UNSPECIFIED PSYCHOSIS TYPE (HCC): ICD-10-CM

## 2019-01-01 DIAGNOSIS — E11.40 TYPE 2 DIABETES MELLITUS WITH DIABETIC NEUROPATHY (HCC): ICD-10-CM

## 2019-01-01 DIAGNOSIS — I12.9 HYPERTENSIVE CHRONIC KIDNEY DISEASE WITH STAGE 1 THROUGH STAGE 4 CHRONIC KIDNEY DISEASE, OR UNSPECIFIED CHRONIC KIDNEY DISEASE: ICD-10-CM

## 2019-01-01 DIAGNOSIS — M54.6 ACUTE MIDLINE THORACIC BACK PAIN: ICD-10-CM

## 2019-01-01 DIAGNOSIS — I48.91 ATRIAL FIBRILLATION, UNSPECIFIED TYPE (HCC): ICD-10-CM

## 2019-01-01 DIAGNOSIS — E05.90 HYPERTHYROIDISM: ICD-10-CM

## 2019-01-01 DIAGNOSIS — R53.1 WEAKNESS GENERALIZED: Primary | ICD-10-CM
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
## 2019-01-01 DIAGNOSIS — N18.9 CHRONIC KIDNEY DISEASE: ICD-10-CM

## 2019-01-01 DIAGNOSIS — Z95.0 CARDIAC PACEMAKER: ICD-10-CM

## 2019-01-01 DIAGNOSIS — S09.90XA INJURY OF HEAD, INITIAL ENCOUNTER: ICD-10-CM

## 2019-01-01 DIAGNOSIS — T07.XXXA MULTIPLE CONTUSIONS: ICD-10-CM

## 2019-01-01 DIAGNOSIS — S72.001A CLOSED FRACTURE OF RIGHT HIP, INITIAL ENCOUNTER (HCC): Primary | ICD-10-CM

## 2019-01-01 DIAGNOSIS — M25.552 LEFT HIP PAIN: ICD-10-CM

## 2019-01-01 DIAGNOSIS — S32.030A COMPRESSION FRACTURE OF L3 VERTEBRA, INITIAL ENCOUNTER (HCC): ICD-10-CM

## 2019-01-01 DIAGNOSIS — J44.1 COPD EXACERBATION (HCC): Primary | ICD-10-CM

## 2019-01-01 DIAGNOSIS — S72.001A CLOSED FRACTURE OF RIGHT HIP, INITIAL ENCOUNTER (HCC): ICD-10-CM

## 2019-01-01 DIAGNOSIS — S22.000A COMPRESSION FRACTURE OF THORACIC VERTEBRA, INITIAL ENCOUNTER, UNSPECIFIED THORACIC VERTEBRAL LEVEL: ICD-10-CM

## 2019-01-01 DIAGNOSIS — E11.22 TYPE 2 DIABETES MELLITUS WITH DIABETIC CHRONIC KIDNEY DISEASE (HCC): ICD-10-CM

## 2019-01-01 DIAGNOSIS — I48.91 ATRIAL FIBRILLATION (HCC): ICD-10-CM

## 2019-01-01 DIAGNOSIS — J44.9 CHRONIC OBSTRUCTIVE PULMONARY DISEASE, UNSPECIFIED COPD TYPE (HCC): ICD-10-CM

## 2019-01-01 DIAGNOSIS — M54.50 LUMBAR BACK PAIN: ICD-10-CM

## 2019-01-01 DIAGNOSIS — I10 ESSENTIAL HYPERTENSION: ICD-10-CM

## 2019-01-01 DIAGNOSIS — I46.9 CARDIOPULMONARY ARREST (HCC): Primary | ICD-10-CM

## 2019-01-01 DIAGNOSIS — E06.3 AUTOIMMUNE THYROIDITIS: ICD-10-CM

## 2019-01-01 DIAGNOSIS — J44.1 CHRONIC OBSTRUCTIVE PULMONARY DISEASE WITH ACUTE EXACERBATION (HCC): ICD-10-CM

## 2019-01-01 LAB
ADENOVIRUS PCR:: NEGATIVE
ADENOVIRUS PCR:: NEGATIVE
ALBUMIN SERPL-MCNC: 2.9 G/DL (ref 3.4–5)
ALBUMIN SERPL-MCNC: 3.1 G/DL (ref 3.1–4.5)
ALBUMIN SERPL-MCNC: 3.2 G/DL (ref 3.4–5)
ALBUMIN SERPL-MCNC: 3.4 G/DL (ref 3.1–4.5)
ALBUMIN/GLOB SERPL: 1 {RATIO} (ref 1–2)
ALBUMIN/GLOB SERPL: 1.1 {RATIO} (ref 1–2)
ALBUMIN/GLOB SERPL: 1.1 {RATIO} (ref 1–2)
ALBUMIN/GLOB SERPL: 1.3 {RATIO} (ref 1–2)
ALP LIVER SERPL-CCNC: 69 U/L (ref 55–142)
ALP LIVER SERPL-CCNC: 69 U/L (ref 55–142)
ALP LIVER SERPL-CCNC: 80 U/L (ref 55–142)
ALP LIVER SERPL-CCNC: 83 U/L (ref 55–142)
ALT SERPL-CCNC: 10 U/L (ref 14–54)
ALT SERPL-CCNC: 11 U/L (ref 13–56)
ALT SERPL-CCNC: 12 U/L (ref 14–54)
ALT SERPL-CCNC: 13 U/L (ref 13–56)
ANION GAP SERPL CALC-SCNC: 4 MMOL/L (ref 0–18)
ANION GAP SERPL CALC-SCNC: 5 MMOL/L (ref 0–18)
ANION GAP SERPL CALC-SCNC: 6 MMOL/L (ref 0–18)
ANION GAP SERPL CALC-SCNC: 7 MMOL/L (ref 0–18)
ANION GAP SERPL CALC-SCNC: 9 MMOL/L (ref 0–18)
ANION GAP SERPL CALC-SCNC: 9 MMOL/L (ref 0–18)
APTT PPP: 37.1 SECONDS (ref 26.1–34.6)
AST SERPL-CCNC: 13 U/L (ref 15–37)
AST SERPL-CCNC: 13 U/L (ref 15–37)
AST SERPL-CCNC: 13 U/L (ref 15–41)
AST SERPL-CCNC: 19 U/L (ref 15–41)
ATRIAL RATE: 61 BPM
ATRIAL RATE: 92 BPM
B PERT DNA SPEC QL NAA+PROBE: NEGATIVE
B PERT DNA SPEC QL NAA+PROBE: NEGATIVE
BASOPHILS # BLD AUTO: 0.01 X10(3) UL (ref 0–0.1)
BASOPHILS # BLD AUTO: 0.01 X10(3) UL (ref 0–0.2)
BASOPHILS # BLD AUTO: 0.02 X10(3) UL (ref 0–0.1)
BASOPHILS # BLD AUTO: 0.03 X10(3) UL (ref 0–0.2)
BASOPHILS # BLD AUTO: 0.03 X10(3) UL (ref 0–0.2)
BASOPHILS # BLD AUTO: 0.04 X10(3) UL (ref 0–0.1)
BASOPHILS NFR BLD AUTO: 0.1 %
BASOPHILS NFR BLD AUTO: 0.1 %
BASOPHILS NFR BLD AUTO: 0.3 %
BASOPHILS NFR BLD AUTO: 0.4 %
BILIRUB SERPL-MCNC: 0.3 MG/DL (ref 0.1–2)
BILIRUB SERPL-MCNC: 0.5 MG/DL (ref 0.1–2)
BILIRUB SERPL-MCNC: 0.5 MG/DL (ref 0.1–2)
BILIRUB SERPL-MCNC: 0.6 MG/DL (ref 0.1–2)
BNP SERPL-MCNC: 267 PG/ML (ref 2–99)
BUN BLD-MCNC: 24 MG/DL (ref 7–18)
BUN BLD-MCNC: 26 MG/DL (ref 8–20)
BUN BLD-MCNC: 29 MG/DL (ref 8–20)
BUN BLD-MCNC: 31 MG/DL (ref 7–18)
BUN BLD-MCNC: 36 MG/DL (ref 8–20)
BUN BLD-MCNC: 37 MG/DL (ref 8–20)
BUN BLD-MCNC: 37 MG/DL (ref 8–20)
BUN/CREAT SERPL: 17.8 (ref 10–20)
BUN/CREAT SERPL: 22 (ref 10–20)
BUN/CREAT SERPL: 23.7 (ref 10–20)
BUN/CREAT SERPL: 24.2 (ref 10–20)
BUN/CREAT SERPL: 25 (ref 10–20)
BUN/CREAT SERPL: 26.9 (ref 10–20)
C PNEUM DNA SPEC QL NAA+PROBE: NEGATIVE
C PNEUM DNA SPEC QL NAA+PROBE: NEGATIVE
CALCIUM BLD-MCNC: 8.3 MG/DL (ref 8.5–10.1)
CALCIUM BLD-MCNC: 8.5 MG/DL (ref 8.3–10.3)
CALCIUM BLD-MCNC: 8.5 MG/DL (ref 8.5–10.1)
CALCIUM BLD-MCNC: 8.8 MG/DL (ref 8.3–10.3)
CALCIUM BLD-MCNC: 9.4 MG/DL (ref 8.3–10.3)
CHLORIDE SERPL-SCNC: 101 MMOL/L (ref 101–111)
CHLORIDE SERPL-SCNC: 103 MMOL/L (ref 101–111)
CHLORIDE SERPL-SCNC: 103 MMOL/L (ref 101–111)
CHLORIDE SERPL-SCNC: 104 MMOL/L (ref 101–111)
CHLORIDE SERPL-SCNC: 105 MMOL/L (ref 101–111)
CHLORIDE SERPL-SCNC: 110 MMOL/L (ref 98–112)
CHLORIDE SERPL-SCNC: 99 MMOL/L (ref 98–107)
CO2 SERPL-SCNC: 27 MMOL/L (ref 22–32)
CO2 SERPL-SCNC: 27 MMOL/L (ref 22–32)
CO2 SERPL-SCNC: 29 MMOL/L (ref 22–32)
CO2 SERPL-SCNC: 30 MMOL/L (ref 22–32)
CO2 SERPL-SCNC: 31 MMOL/L (ref 21–32)
CO2 SERPL-SCNC: 32 MMOL/L (ref 21–32)
CO2 SERPL-SCNC: 32 MMOL/L (ref 22–32)
CORONAVIRUS 229E PCR:: NEGATIVE
CORONAVIRUS 229E PCR:: NEGATIVE
CORONAVIRUS HKU1 PCR:: NEGATIVE
CORONAVIRUS HKU1 PCR:: NEGATIVE
CORONAVIRUS NL63 PCR:: NEGATIVE
CORONAVIRUS NL63 PCR:: NEGATIVE
CORONAVIRUS OC43 PCR:: NEGATIVE
CORONAVIRUS OC43 PCR:: NEGATIVE
CREAT BLD-MCNC: 1.08 MG/DL (ref 0.55–1.02)
CREAT BLD-MCNC: 1.18 MG/DL (ref 0.55–1.02)
CREAT BLD-MCNC: 1.28 MG/DL (ref 0.55–1.02)
CREAT BLD-MCNC: 1.34 MG/DL (ref 0.55–1.02)
CREAT BLD-MCNC: 1.35 MG/DL (ref 0.55–1.02)
CREAT BLD-MCNC: 1.48 MG/DL (ref 0.55–1.02)
CREAT BLD-MCNC: 1.56 MG/DL (ref 0.55–1.02)
CRP SERPL-MCNC: 0.59 MG/DL (ref ?–1)
D-DIMER: 1.15 UG/ML FEU (ref 0–0.49)
DEPRECATED HBV CORE AB SER IA-ACNC: 21.9 NG/ML (ref 18–340)
DEPRECATED RDW RBC AUTO: 47.4 FL (ref 35.1–46.3)
DEPRECATED RDW RBC AUTO: 51.2 FL (ref 35.1–46.3)
DEPRECATED RDW RBC AUTO: 53.4 FL (ref 35.1–46.3)
EOSINOPHIL # BLD AUTO: 0 X10(3) UL (ref 0–0.3)
EOSINOPHIL # BLD AUTO: 0.02 X10(3) UL (ref 0–0.3)
EOSINOPHIL # BLD AUTO: 0.2 X10(3) UL (ref 0–0.7)
EOSINOPHIL # BLD AUTO: 0.46 X10(3) UL (ref 0–0.7)
EOSINOPHIL # BLD AUTO: 0.59 X10(3) UL (ref 0–0.7)
EOSINOPHIL # BLD AUTO: 0.77 X10(3) UL (ref 0–0.3)
EOSINOPHIL NFR BLD AUTO: 0 %
EOSINOPHIL NFR BLD AUTO: 0.3 %
EOSINOPHIL NFR BLD AUTO: 2.5 %
EOSINOPHIL NFR BLD AUTO: 6 %
EOSINOPHIL NFR BLD AUTO: 7 %
EOSINOPHIL NFR BLD AUTO: 7.8 %
ERYTHROCYTE [DISTWIDTH] IN BLOOD BY AUTOMATED COUNT: 14 % (ref 11.5–16)
ERYTHROCYTE [DISTWIDTH] IN BLOOD BY AUTOMATED COUNT: 14.1 % (ref 11.5–16)
ERYTHROCYTE [DISTWIDTH] IN BLOOD BY AUTOMATED COUNT: 14.1 % (ref 11.5–16)
ERYTHROCYTE [DISTWIDTH] IN BLOOD BY AUTOMATED COUNT: 14.3 % (ref 11.5–16)
ERYTHROCYTE [DISTWIDTH] IN BLOOD BY AUTOMATED COUNT: 14.7 % (ref 11–15)
ERYTHROCYTE [DISTWIDTH] IN BLOOD BY AUTOMATED COUNT: 15.3 % (ref 11–15)
ERYTHROCYTE [DISTWIDTH] IN BLOOD BY AUTOMATED COUNT: 15.9 % (ref 11–15)
EST. AVERAGE GLUCOSE BLD GHB EST-MCNC: 120 MG/DL (ref 68–126)
EST. AVERAGE GLUCOSE BLD GHB EST-MCNC: 120 MG/DL (ref 68–126)
EST. AVERAGE GLUCOSE BLD GHB EST-MCNC: 143 MG/DL (ref 68–126)
EXPIRATORY PRESSURE: 6 CM H2O
FIO2: 40 %
FLUAV RNA SPEC QL NAA+PROBE: NEGATIVE
FLUAV RNA SPEC QL NAA+PROBE: NEGATIVE
FLUBV RNA SPEC QL NAA+PROBE: NEGATIVE
FLUBV RNA SPEC QL NAA+PROBE: NEGATIVE
GLOBULIN PLAS-MCNC: 2.3 G/DL (ref 2.8–4.4)
GLOBULIN PLAS-MCNC: 2.8 G/DL (ref 2.8–4.4)
GLOBULIN PLAS-MCNC: 3 G/DL (ref 2.8–4.4)
GLOBULIN PLAS-MCNC: 3.2 G/DL (ref 2.8–4.4)
GLUCOSE BLD-MCNC: 100 MG/DL (ref 65–99)
GLUCOSE BLD-MCNC: 101 MG/DL (ref 65–99)
GLUCOSE BLD-MCNC: 114 MG/DL (ref 70–99)
GLUCOSE BLD-MCNC: 123 MG/DL (ref 70–99)
GLUCOSE BLD-MCNC: 125 MG/DL (ref 65–99)
GLUCOSE BLD-MCNC: 131 MG/DL (ref 65–99)
GLUCOSE BLD-MCNC: 131 MG/DL (ref 65–99)
GLUCOSE BLD-MCNC: 141 MG/DL (ref 65–99)
GLUCOSE BLD-MCNC: 149 MG/DL (ref 65–99)
GLUCOSE BLD-MCNC: 153 MG/DL (ref 65–99)
GLUCOSE BLD-MCNC: 157 MG/DL (ref 70–99)
GLUCOSE BLD-MCNC: 163 MG/DL (ref 65–99)
GLUCOSE BLD-MCNC: 164 MG/DL (ref 65–99)
GLUCOSE BLD-MCNC: 170 MG/DL (ref 70–99)
GLUCOSE BLD-MCNC: 177 MG/DL (ref 65–99)
GLUCOSE BLD-MCNC: 178 MG/DL (ref 65–99)
GLUCOSE BLD-MCNC: 180 MG/DL (ref 65–99)
GLUCOSE BLD-MCNC: 182 MG/DL (ref 65–99)
GLUCOSE BLD-MCNC: 183 MG/DL (ref 65–99)
GLUCOSE BLD-MCNC: 186 MG/DL (ref 65–99)
GLUCOSE BLD-MCNC: 188 MG/DL (ref 65–99)
GLUCOSE BLD-MCNC: 209 MG/DL (ref 70–99)
GLUCOSE BLD-MCNC: 214 MG/DL (ref 65–99)
GLUCOSE BLD-MCNC: 218 MG/DL (ref 65–99)
GLUCOSE BLD-MCNC: 220 MG/DL (ref 65–99)
GLUCOSE BLD-MCNC: 226 MG/DL (ref 65–99)
GLUCOSE BLD-MCNC: 234 MG/DL (ref 65–99)
GLUCOSE BLD-MCNC: 237 MG/DL (ref 65–99)
GLUCOSE BLD-MCNC: 240 MG/DL (ref 65–99)
GLUCOSE BLD-MCNC: 242 MG/DL (ref 70–99)
GLUCOSE BLD-MCNC: 284 MG/DL (ref 65–99)
GLUCOSE BLD-MCNC: 295 MG/DL (ref 65–99)
GLUCOSE BLD-MCNC: 90 MG/DL (ref 70–99)
HAV IGM SER QL: 2.4 MG/DL (ref 1.8–2.5)
HBA1C MFR BLD HPLC: 5.8 % (ref ?–5.7)
HBA1C MFR BLD HPLC: 5.8 % (ref ?–5.7)
HBA1C MFR BLD HPLC: 6.6 % (ref ?–5.7)
HCT VFR BLD AUTO: 33.2 % (ref 34–50)
HCT VFR BLD AUTO: 34.1 % (ref 34–50)
HCT VFR BLD AUTO: 34.1 % (ref 34–50)
HCT VFR BLD AUTO: 34.7 % (ref 35–48)
HCT VFR BLD AUTO: 36.4 % (ref 34–50)
HCT VFR BLD AUTO: 36.8 % (ref 35–48)
HCT VFR BLD AUTO: 39 % (ref 35–48)
HGB BLD-MCNC: 10.3 G/DL (ref 12–16)
HGB BLD-MCNC: 10.3 G/DL (ref 12–16)
HGB BLD-MCNC: 10.7 G/DL (ref 12–16)
HGB BLD-MCNC: 10.9 G/DL (ref 12–16)
HGB BLD-MCNC: 11.4 G/DL (ref 12–16)
HGB BLD-MCNC: 11.5 G/DL (ref 12–16)
HGB BLD-MCNC: 12.2 G/DL (ref 12–16)
IMM GRANULOCYTES # BLD AUTO: 0.02 X10(3) UL (ref 0–1)
IMM GRANULOCYTES # BLD AUTO: 0.03 X10(3) UL (ref 0–1)
IMM GRANULOCYTES # BLD AUTO: 0.04 X10(3) UL (ref 0–1)
IMM GRANULOCYTES NFR BLD: 0.3 %
IMM GRANULOCYTES NFR BLD: 0.4 %
IMM GRANULOCYTES NFR BLD: 0.5 %
IMMATURE GRANULOCYTE COUNT: 0.02 X10(3) UL (ref 0–1)
IMMATURE GRANULOCYTE COUNT: 0.02 X10(3) UL (ref 0–1)
IMMATURE GRANULOCYTE COUNT: 0.06 X10(3) UL (ref 0–1)
IMMATURE GRANULOCYTE RATIO %: 0.3 %
IMMATURE GRANULOCYTE RATIO %: 0.3 %
IMMATURE GRANULOCYTE RATIO %: 0.6 %
INR BLD: 1.01 (ref 0.9–1.1)
INR BLD: 1.01 (ref 0.9–1.1)
INR BLD: 1.03 (ref 0.9–1.1)
INR BLD: 1.13 (ref 0.9–1.1)
INR BLD: 1.15 (ref 0.9–1.1)
INR BLD: 1.31 (ref 0.9–1.1)
INR BLD: 1.57 (ref 0.9–1.1)
INR BLD: 4.59 (ref 0.9–1.1)
INSP PRESSURE: 12 CM H2O
IRON SATURATION: 7 % (ref 15–50)
IRON: 24 UG/DL (ref 28–170)
LACTIC ACID: 1.2 MMOL/L (ref 0.5–2)
LACTIC ACID: 1.7 MMOL/L (ref 0.5–2)
LYMPHOCYTES # BLD AUTO: 0.55 X10(3) UL (ref 0.9–4)
LYMPHOCYTES # BLD AUTO: 1.18 X10(3) UL (ref 0.9–4)
LYMPHOCYTES # BLD AUTO: 1.32 X10(3) UL (ref 0.9–4)
LYMPHOCYTES # BLD AUTO: 1.9 X10(3) UL (ref 1–4)
LYMPHOCYTES # BLD AUTO: 1.95 X10(3) UL (ref 1–4)
LYMPHOCYTES # BLD AUTO: 2.05 X10(3) UL (ref 1–4)
LYMPHOCYTES NFR BLD AUTO: 13.4 %
LYMPHOCYTES NFR BLD AUTO: 18 %
LYMPHOCYTES NFR BLD AUTO: 23 %
LYMPHOCYTES NFR BLD AUTO: 24.9 %
LYMPHOCYTES NFR BLD AUTO: 25.5 %
LYMPHOCYTES NFR BLD AUTO: 8.1 %
M PROTEIN MFR SERPL ELPH: 5.2 G/DL (ref 6.4–8.2)
M PROTEIN MFR SERPL ELPH: 6 G/DL (ref 6.4–8.2)
M PROTEIN MFR SERPL ELPH: 6.1 G/DL (ref 6.4–8.2)
M PROTEIN MFR SERPL ELPH: 6.6 G/DL (ref 6.4–8.2)
MCH RBC QN AUTO: 27.5 PG (ref 26–34)
MCH RBC QN AUTO: 28.4 PG (ref 27–33.2)
MCH RBC QN AUTO: 28.5 PG (ref 26–34)
MCH RBC QN AUTO: 28.5 PG (ref 27–33.2)
MCH RBC QN AUTO: 28.8 PG (ref 26–34)
MCH RBC QN AUTO: 29.3 PG (ref 27–33.2)
MCH RBC QN AUTO: 29.4 PG (ref 27–33.2)
MCHC RBC AUTO-ENTMCNC: 30.2 G/DL (ref 31–37)
MCHC RBC AUTO-ENTMCNC: 30.8 G/DL (ref 31–37)
MCHC RBC AUTO-ENTMCNC: 31 G/DL (ref 31–37)
MCHC RBC AUTO-ENTMCNC: 31.3 G/DL (ref 31–37)
MCHC RBC AUTO-ENTMCNC: 32 G/DL (ref 31–37)
MCV RBC AUTO: 88 FL (ref 80–100)
MCV RBC AUTO: 90.5 FL (ref 81–100)
MCV RBC AUTO: 91.1 FL (ref 80–100)
MCV RBC AUTO: 91.7 FL (ref 81–100)
MCV RBC AUTO: 91.9 FL (ref 81–100)
MCV RBC AUTO: 93.3 FL (ref 80–100)
MCV RBC AUTO: 96.9 FL (ref 81–100)
METAPNEUMOVIRUS PCR:: NEGATIVE
METAPNEUMOVIRUS PCR:: NEGATIVE
MONOCYTES # BLD AUTO: 0.12 X10(3) UL (ref 0.1–1)
MONOCYTES # BLD AUTO: 0.52 X10(3) UL (ref 0.1–1)
MONOCYTES # BLD AUTO: 0.57 X10(3) UL (ref 0.1–1)
MONOCYTES # BLD AUTO: 0.68 X10(3) UL (ref 0.1–1)
MONOCYTES # BLD AUTO: 0.8 X10(3) UL (ref 0.1–1)
MONOCYTES # BLD AUTO: 0.85 X10(3) UL (ref 0.1–1)
MONOCYTES NFR BLD AUTO: 1.8 %
MONOCYTES NFR BLD AUTO: 10 %
MONOCYTES NFR BLD AUTO: 11.1 %
MONOCYTES NFR BLD AUTO: 5.3 %
MONOCYTES NFR BLD AUTO: 8 %
MONOCYTES NFR BLD AUTO: 8.7 %
MYCOPLASMA PNEUMONIA PCR:: NEGATIVE
MYCOPLASMA PNEUMONIA PCR:: NEGATIVE
NEUTROPHIL ABS PRELIM: 4.75 X10 (3) UL (ref 1.3–6.7)
NEUTROPHIL ABS PRELIM: 6.08 X10 (3) UL (ref 1.3–6.7)
NEUTROPHIL ABS PRELIM: 7.13 X10 (3) UL (ref 1.3–6.7)
NEUTROPHILS # BLD AUTO: 4.38 X10 (3) UL (ref 1.5–7.7)
NEUTROPHILS # BLD AUTO: 4.38 X10(3) UL (ref 1.5–7.7)
NEUTROPHILS # BLD AUTO: 4.75 X10(3) UL (ref 1.3–6.7)
NEUTROPHILS # BLD AUTO: 4.94 X10 (3) UL (ref 1.5–7.7)
NEUTROPHILS # BLD AUTO: 4.94 X10(3) UL (ref 1.5–7.7)
NEUTROPHILS # BLD AUTO: 5.19 X10 (3) UL (ref 1.5–7.7)
NEUTROPHILS # BLD AUTO: 5.19 X10(3) UL (ref 1.5–7.7)
NEUTROPHILS # BLD AUTO: 6.08 X10(3) UL (ref 1.3–6.7)
NEUTROPHILS # BLD AUTO: 7.13 X10(3) UL (ref 1.3–6.7)
NEUTROPHILS NFR BLD AUTO: 57.3 %
NEUTROPHILS NFR BLD AUTO: 61.2 %
NEUTROPHILS NFR BLD AUTO: 61.4 %
NEUTROPHILS NFR BLD AUTO: 72.4 %
NEUTROPHILS NFR BLD AUTO: 72.5 %
NEUTROPHILS NFR BLD AUTO: 89.7 %
OSMOLALITY SERPL CALC.SUM OF ELEC: 295 MOSM/KG (ref 275–295)
OSMOLALITY SERPL CALC.SUM OF ELEC: 295 MOSM/KG (ref 275–295)
OSMOLALITY SERPL CALC.SUM OF ELEC: 299 MOSM/KG (ref 275–295)
OSMOLALITY SERPL CALC.SUM OF ELEC: 300 MOSM/KG (ref 275–295)
OSMOLALITY SERPL CALC.SUM OF ELEC: 304 MOSM/KG (ref 275–295)
OSMOLALITY SERPL CALC.SUM OF ELEC: 308 MOSM/KG (ref 275–295)
P AXIS: 88 DEGREES
P AXIS: 96 DEGREES
P-R INTERVAL: 158 MS
P-R INTERVAL: 186 MS
PARAINFLUENZA 1 PCR:: NEGATIVE
PARAINFLUENZA 1 PCR:: NEGATIVE
PARAINFLUENZA 2 PCR:: NEGATIVE
PARAINFLUENZA 2 PCR:: NEGATIVE
PARAINFLUENZA 3 PCR:: NEGATIVE
PARAINFLUENZA 3 PCR:: NEGATIVE
PARAINFLUENZA 4 PCR:: NEGATIVE
PARAINFLUENZA 4 PCR:: NEGATIVE
PLATELET # BLD AUTO: 139 10(3)UL (ref 150–450)
PLATELET # BLD AUTO: 155 10(3)UL (ref 150–450)
PLATELET # BLD AUTO: 167 10(3)UL (ref 150–450)
PLATELET # BLD AUTO: 182 10(3)UL (ref 150–450)
PLATELET # BLD AUTO: 206 10(3)UL (ref 150–450)
PLATELET # BLD AUTO: 210 10(3)UL (ref 150–450)
PLATELET # BLD AUTO: 210 10(3)UL (ref 150–450)
POTASSIUM SERPL-SCNC: 3.8 MMOL/L (ref 3.5–5.1)
POTASSIUM SERPL-SCNC: 3.9 MMOL/L (ref 3.6–5.1)
POTASSIUM SERPL-SCNC: 4.1 MMOL/L (ref 3.6–5.1)
POTASSIUM SERPL-SCNC: 4.3 MMOL/L (ref 3.6–5.1)
POTASSIUM SERPL-SCNC: 4.4 MMOL/L (ref 3.5–5.1)
POTASSIUM SERPL-SCNC: 4.7 MMOL/L (ref 3.6–5.1)
POTASSIUM SERPL-SCNC: 4.8 MMOL/L (ref 3.6–5.1)
PROCALCITONIN SERPL-MCNC: <0.11 NG/ML
PSA SERPL DL<=0.01 NG/ML-MCNC: 13.7 SECONDS (ref 12.4–14.7)
PSA SERPL DL<=0.01 NG/ML-MCNC: 13.7 SECONDS (ref 12.4–14.7)
PSA SERPL DL<=0.01 NG/ML-MCNC: 13.9 SECONDS (ref 12.4–14.7)
PSA SERPL DL<=0.01 NG/ML-MCNC: 15 SECONDS (ref 12.4–14.7)
PSA SERPL DL<=0.01 NG/ML-MCNC: 15.2 SECONDS (ref 12.4–14.7)
PSA SERPL DL<=0.01 NG/ML-MCNC: 16.8 SECONDS (ref 12.4–14.7)
PSA SERPL DL<=0.01 NG/ML-MCNC: 19.3 SECONDS (ref 12.4–14.7)
PSA SERPL DL<=0.01 NG/ML-MCNC: 44.7 SECONDS (ref 12.4–14.7)
Q-T INTERVAL: 420 MS
Q-T INTERVAL: 472 MS
QRS DURATION: 148 MS
QRS DURATION: 176 MS
QTC CALCULATION (BEZET): 475 MS
QTC CALCULATION (BEZET): 519 MS
R AXIS: -65 DEGREES
R AXIS: -66 DEGREES
RBC # BLD AUTO: 3.52 X10(6)UL (ref 3.8–5.1)
RBC # BLD AUTO: 3.62 X10(6)UL (ref 3.8–5.1)
RBC # BLD AUTO: 3.71 X10(6)UL (ref 3.8–5.1)
RBC # BLD AUTO: 3.72 X10(6)UL (ref 3.8–5.3)
RBC # BLD AUTO: 4.02 X10(6)UL (ref 3.8–5.1)
RBC # BLD AUTO: 4.18 X10(6)UL (ref 3.8–5.3)
RBC # BLD AUTO: 4.28 X10(6)UL (ref 3.8–5.3)
RED CELL DISTRIBUTION WIDTH-SD: 46.8 FL (ref 35.1–46.3)
RED CELL DISTRIBUTION WIDTH-SD: 47.5 FL (ref 35.1–46.3)
RED CELL DISTRIBUTION WIDTH-SD: 48.3 FL (ref 35.1–46.3)
RED CELL DISTRIBUTION WIDTH-SD: 50.1 FL (ref 35.1–46.3)
RHINOVIRUS/ENTERO PCR:: NEGATIVE
RHINOVIRUS/ENTERO PCR:: NEGATIVE
RSV RNA SPEC QL NAA+PROBE: NEGATIVE
RSV RNA SPEC QL NAA+PROBE: NEGATIVE
SED RATE-ML: 14 MM/HR (ref 0–25)
SODIUM SERPL-SCNC: 135 MMOL/L (ref 136–144)
SODIUM SERPL-SCNC: 138 MMOL/L (ref 136–144)
SODIUM SERPL-SCNC: 139 MMOL/L (ref 136–144)
SODIUM SERPL-SCNC: 139 MMOL/L (ref 136–144)
SODIUM SERPL-SCNC: 140 MMOL/L (ref 136–144)
SODIUM SERPL-SCNC: 140 MMOL/L (ref 136–145)
SODIUM SERPL-SCNC: 146 MMOL/L (ref 136–145)
T AXIS: 102 DEGREES
T AXIS: 110 DEGREES
T3FREE SERPL-MCNC: 1.9 PG/ML (ref 2.4–4.2)
T3FREE SERPL-MCNC: 1.98 PG/ML (ref 2.3–4.2)
T4 FREE SERPL-MCNC: 0.7 NG/DL (ref 0.8–1.7)
T4 FREE SERPL-MCNC: 1 NG/DL (ref 0.9–1.8)
TOTAL IRON BINDING CAPACITY: 334 UG/DL (ref 240–450)
TRANSFERRIN SERPL-MCNC: 224 MG/DL (ref 200–360)
TROPONIN I SERPL-MCNC: <0.046 NG/ML (ref ?–0.05)
TSI SER-ACNC: 0.01 MIU/ML (ref 0.35–5.5)
TSI SER-ACNC: 0.02 MIU/ML (ref 0.36–3.74)
TSI SER-ACNC: 0.39 MIU/ML (ref 0.36–3.74)
VENOUS BASE EXCESS: 2
VENOUS BLOOD GAS HCO3: 27.8 MEQ/L (ref 23–27)
VENOUS O2 SAT CALC: 54 % (ref 72–78)
VENOUS O2 SATURATION: 52 % (ref 72–78)
VENOUS PCO2: 49 MM HG (ref 38–50)
VENOUS PH: 7.37 (ref 7.33–7.43)
VENOUS PO2: 29 MM HG (ref 30–50)
VENTRICULAR RATE: 61 BPM
VENTRICULAR RATE: 92 BPM
WBC # BLD AUTO: 6.6 X10(3) UL (ref 4–13)
WBC # BLD AUTO: 6.8 X10(3) UL (ref 4–13)
WBC # BLD AUTO: 7.4 X10(3) UL (ref 4–13)
WBC # BLD AUTO: 7.6 X10(3) UL (ref 4–11)
WBC # BLD AUTO: 8 X10(3) UL (ref 4–11)
WBC # BLD AUTO: 8.5 X10(3) UL (ref 4–11)
WBC # BLD AUTO: 9.8 X10(3) UL (ref 4–13)

## 2019-01-01 PROCEDURE — X1114 CARDIAC DEVICE HOME CHECK - REMOTE UNSCHEDULED: HCPCS | Performed by: INTERNAL MEDICINE

## 2019-01-01 PROCEDURE — 99233 SBSQ HOSP IP/OBS HIGH 50: CPT | Performed by: STUDENT IN AN ORGANIZED HEALTH CARE EDUCATION/TRAINING PROGRAM

## 2019-01-01 PROCEDURE — 72100 X-RAY EXAM L-S SPINE 2/3 VWS: CPT | Performed by: PHYSICIAN ASSISTANT

## 2019-01-01 PROCEDURE — 99285 EMERGENCY DEPT VISIT HI MDM: CPT

## 2019-01-01 PROCEDURE — 97530 THERAPEUTIC ACTIVITIES: CPT

## 2019-01-01 PROCEDURE — 80053 COMPREHEN METABOLIC PANEL: CPT | Performed by: FAMILY MEDICINE

## 2019-01-01 PROCEDURE — 76377 3D RENDER W/INTRP POSTPROCES: CPT | Performed by: STUDENT IN AN ORGANIZED HEALTH CARE EDUCATION/TRAINING PROGRAM

## 2019-01-01 PROCEDURE — 93306 TTE W/DOPPLER COMPLETE: CPT | Performed by: INTERNAL MEDICINE

## 2019-01-01 PROCEDURE — 36415 COLL VENOUS BLD VENIPUNCTURE: CPT

## 2019-01-01 PROCEDURE — 73700 CT LOWER EXTREMITY W/O DYE: CPT | Performed by: STUDENT IN AN ORGANIZED HEALTH CARE EDUCATION/TRAINING PROGRAM

## 2019-01-01 PROCEDURE — 83690 ASSAY OF LIPASE: CPT | Performed by: EMERGENCY MEDICINE

## 2019-01-01 PROCEDURE — 96374 THER/PROPH/DIAG INJ IV PUSH: CPT | Performed by: EMERGENCY MEDICINE

## 2019-01-01 PROCEDURE — 80053 COMPREHEN METABOLIC PANEL: CPT | Performed by: EMERGENCY MEDICINE

## 2019-01-01 PROCEDURE — 73610 X-RAY EXAM OF ANKLE: CPT | Performed by: STUDENT IN AN ORGANIZED HEALTH CARE EDUCATION/TRAINING PROGRAM

## 2019-01-01 PROCEDURE — 73560 X-RAY EXAM OF KNEE 1 OR 2: CPT | Performed by: STUDENT IN AN ORGANIZED HEALTH CARE EDUCATION/TRAINING PROGRAM

## 2019-01-01 PROCEDURE — 85610 PROTHROMBIN TIME: CPT | Performed by: EMERGENCY MEDICINE

## 2019-01-01 PROCEDURE — 93294 REM INTERROG EVL PM/LDLS PM: CPT | Performed by: INTERNAL MEDICINE

## 2019-01-01 PROCEDURE — 84443 ASSAY THYROID STIM HORMONE: CPT | Performed by: FAMILY MEDICINE

## 2019-01-01 PROCEDURE — 31500 INSERT EMERGENCY AIRWAY: CPT

## 2019-01-01 PROCEDURE — 71045 X-RAY EXAM CHEST 1 VIEW: CPT

## 2019-01-01 PROCEDURE — 99223 1ST HOSP IP/OBS HIGH 75: CPT | Performed by: HOSPITALIST

## 2019-01-01 PROCEDURE — 85025 COMPLETE CBC W/AUTO DIFF WBC: CPT

## 2019-01-01 PROCEDURE — 71045 X-RAY EXAM CHEST 1 VIEW: CPT | Performed by: EMERGENCY MEDICINE

## 2019-01-01 PROCEDURE — 99232 SBSQ HOSP IP/OBS MODERATE 35: CPT | Performed by: HOSPITALIST

## 2019-01-01 PROCEDURE — 73502 X-RAY EXAM HIP UNI 2-3 VIEWS: CPT | Performed by: PHYSICIAN ASSISTANT

## 2019-01-01 PROCEDURE — 82962 GLUCOSE BLOOD TEST: CPT

## 2019-01-01 PROCEDURE — 80053 COMPREHEN METABOLIC PANEL: CPT

## 2019-01-01 PROCEDURE — 99285 EMERGENCY DEPT VISIT HI MDM: CPT | Performed by: EMERGENCY MEDICINE

## 2019-01-01 PROCEDURE — 73502 X-RAY EXAM HIP UNI 2-3 VIEWS: CPT | Performed by: HOSPITALIST

## 2019-01-01 PROCEDURE — 83036 HEMOGLOBIN GLYCOSYLATED A1C: CPT | Performed by: FAMILY MEDICINE

## 2019-01-01 PROCEDURE — 99239 HOSP IP/OBS DSCHRG MGMT >30: CPT | Performed by: HOSPITALIST

## 2019-01-01 PROCEDURE — 80048 BASIC METABOLIC PNL TOTAL CA: CPT | Performed by: FAMILY MEDICINE

## 2019-01-01 PROCEDURE — 99239 HOSP IP/OBS DSCHRG MGMT >30: CPT | Performed by: STUDENT IN AN ORGANIZED HEALTH CARE EDUCATION/TRAINING PROGRAM

## 2019-01-01 PROCEDURE — 93005 ELECTROCARDIOGRAM TRACING: CPT

## 2019-01-01 PROCEDURE — 73502 X-RAY EXAM HIP UNI 2-3 VIEWS: CPT | Performed by: STUDENT IN AN ORGANIZED HEALTH CARE EDUCATION/TRAINING PROGRAM

## 2019-01-01 PROCEDURE — 84443 ASSAY THYROID STIM HORMONE: CPT

## 2019-01-01 PROCEDURE — 94640 AIRWAY INHALATION TREATMENT: CPT

## 2019-01-01 PROCEDURE — 85025 COMPLETE CBC W/AUTO DIFF WBC: CPT | Performed by: FAMILY MEDICINE

## 2019-01-01 PROCEDURE — 99306 1ST NF CARE HIGH MDM 50: CPT | Performed by: FAMILY MEDICINE

## 2019-01-01 PROCEDURE — 96361 HYDRATE IV INFUSION ADD-ON: CPT | Performed by: EMERGENCY MEDICINE

## 2019-01-01 PROCEDURE — 97161 PT EVAL LOW COMPLEX 20 MIN: CPT

## 2019-01-01 PROCEDURE — 92950 HEART/LUNG RESUSCITATION CPR: CPT

## 2019-01-01 PROCEDURE — 71101 X-RAY EXAM UNILAT RIBS/CHEST: CPT | Performed by: STUDENT IN AN ORGANIZED HEALTH CARE EDUCATION/TRAINING PROGRAM

## 2019-01-01 PROCEDURE — 83036 HEMOGLOBIN GLYCOSYLATED A1C: CPT

## 2019-01-01 PROCEDURE — 93010 ELECTROCARDIOGRAM REPORT: CPT

## 2019-01-01 PROCEDURE — 85025 COMPLETE CBC W/AUTO DIFF WBC: CPT | Performed by: EMERGENCY MEDICINE

## 2019-01-01 PROCEDURE — 70450 CT HEAD/BRAIN W/O DYE: CPT | Performed by: EMERGENCY MEDICINE

## 2019-01-01 PROCEDURE — 85730 THROMBOPLASTIN TIME PARTIAL: CPT | Performed by: EMERGENCY MEDICINE

## 2019-01-01 PROCEDURE — 71046 X-RAY EXAM CHEST 2 VIEWS: CPT | Performed by: EMERGENCY MEDICINE

## 2019-01-01 PROCEDURE — 84484 ASSAY OF TROPONIN QUANT: CPT | Performed by: EMERGENCY MEDICINE

## 2019-01-01 PROCEDURE — 97116 GAIT TRAINING THERAPY: CPT

## 2019-01-01 PROCEDURE — 84481 FREE ASSAY (FT-3): CPT

## 2019-01-01 PROCEDURE — 5A09357 ASSISTANCE WITH RESPIRATORY VENTILATION, LESS THAN 24 CONSECUTIVE HOURS, CONTINUOUS POSITIVE AIRWAY PRESSURE: ICD-10-PCS | Performed by: HOSPITALIST

## 2019-01-01 PROCEDURE — 72072 X-RAY EXAM THORAC SPINE 3VWS: CPT | Performed by: PHYSICIAN ASSISTANT

## 2019-01-01 PROCEDURE — 93010 ELECTROCARDIOGRAM REPORT: CPT | Performed by: EMERGENCY MEDICINE

## 2019-01-01 PROCEDURE — 84439 ASSAY OF FREE THYROXINE: CPT

## 2019-01-01 RX ORDER — WARFARIN SODIUM 5 MG/1
TABLET ORAL
COMMUNITY
Start: 2016-04-29

## 2019-01-01 RX ORDER — ATENOLOL 25 MG/1
12.5 TABLET ORAL
Status: DISCONTINUED | OUTPATIENT
Start: 2019-01-01 | End: 2019-01-01

## 2019-01-01 RX ORDER — ACETAMINOPHEN 325 MG/1
650 TABLET ORAL EVERY 6 HOURS PRN
Status: DISCONTINUED | OUTPATIENT
Start: 2019-01-01 | End: 2019-01-01

## 2019-01-01 RX ORDER — ONDANSETRON 2 MG/ML
4 INJECTION INTRAMUSCULAR; INTRAVENOUS ONCE
Status: COMPLETED | OUTPATIENT
Start: 2019-01-01 | End: 2019-01-01

## 2019-01-01 RX ORDER — PREDNISONE 10 MG/1
10 TABLET ORAL DAILY
Status: ON HOLD | COMMUNITY
Start: 2019-01-01 | End: 2019-01-01

## 2019-01-01 RX ORDER — IPRATROPIUM BROMIDE AND ALBUTEROL SULFATE 2.5; .5 MG/3ML; MG/3ML
3 SOLUTION RESPIRATORY (INHALATION)
Status: DISCONTINUED | OUTPATIENT
Start: 2019-01-01 | End: 2019-01-01

## 2019-01-01 RX ORDER — DEXTROSE MONOHYDRATE 25 G/50ML
50 INJECTION, SOLUTION INTRAVENOUS
Status: DISCONTINUED | OUTPATIENT
Start: 2019-01-01 | End: 2019-01-01

## 2019-01-01 RX ORDER — HYDROCODONE BITARTRATE AND ACETAMINOPHEN 5; 325 MG/1; MG/1
1 TABLET ORAL EVERY 8 HOURS PRN
Status: DISCONTINUED | OUTPATIENT
Start: 2019-01-01 | End: 2019-01-01

## 2019-01-01 RX ORDER — POLYETHYLENE GLYCOL 3350 17 G/17G
17 POWDER, FOR SOLUTION ORAL DAILY PRN
Status: DISCONTINUED | OUTPATIENT
Start: 2019-01-01 | End: 2019-01-01

## 2019-01-01 RX ORDER — LORAZEPAM 2 MG/ML
0.25 INJECTION INTRAMUSCULAR ONCE
Status: COMPLETED | OUTPATIENT
Start: 2019-01-01 | End: 2019-01-01

## 2019-01-01 RX ORDER — ONDANSETRON 2 MG/ML
4 INJECTION INTRAMUSCULAR; INTRAVENOUS EVERY 4 HOURS PRN
Status: DISCONTINUED | OUTPATIENT
Start: 2019-01-01 | End: 2019-01-01

## 2019-01-01 RX ORDER — IPRATROPIUM BROMIDE AND ALBUTEROL SULFATE 2.5; .5 MG/3ML; MG/3ML
SOLUTION RESPIRATORY (INHALATION)
Status: DISCONTINUED
Start: 2019-01-01 | End: 2019-01-01

## 2019-01-01 RX ORDER — WARFARIN SODIUM 2.5 MG/1
1.25 TABLET ORAL DAILY
Status: ON HOLD | COMMUNITY
End: 2019-01-01

## 2019-01-01 RX ORDER — WARFARIN SODIUM 2.5 MG/1
2.5 TABLET ORAL NIGHTLY
Status: DISCONTINUED | OUTPATIENT
Start: 2019-01-01 | End: 2019-01-01

## 2019-01-01 RX ORDER — IPRATROPIUM BROMIDE AND ALBUTEROL SULFATE 2.5; .5 MG/3ML; MG/3ML
3 SOLUTION RESPIRATORY (INHALATION) EVERY 6 HOURS PRN
Status: DISCONTINUED | OUTPATIENT
Start: 2019-01-01 | End: 2019-01-01

## 2019-01-01 RX ORDER — MORPHINE SULFATE 4 MG/ML
4 INJECTION, SOLUTION INTRAMUSCULAR; INTRAVENOUS EVERY 2 HOUR PRN
Status: DISCONTINUED | OUTPATIENT
Start: 2019-01-01 | End: 2019-01-01

## 2019-01-01 RX ORDER — METOCLOPRAMIDE HYDROCHLORIDE 5 MG/ML
5 INJECTION INTRAMUSCULAR; INTRAVENOUS EVERY 8 HOURS PRN
Status: DISCONTINUED | OUTPATIENT
Start: 2019-01-01 | End: 2019-01-01

## 2019-01-01 RX ORDER — WARFARIN SODIUM 2.5 MG/1
2.5 TABLET ORAL SEE ADMIN INSTRUCTIONS
Status: ON HOLD | COMMUNITY
End: 2019-01-01

## 2019-01-01 RX ORDER — BUDESONIDE 0.5 MG/2ML
0.5 INHALANT ORAL 2 TIMES DAILY
Qty: 60 AMPULE | Refills: 11 | Status: SHIPPED | OUTPATIENT
Start: 2019-01-01 | End: 2019-01-01

## 2019-01-01 RX ORDER — ONDANSETRON 2 MG/ML
4 INJECTION INTRAMUSCULAR; INTRAVENOUS EVERY 6 HOURS PRN
Status: DISCONTINUED | OUTPATIENT
Start: 2019-01-01 | End: 2019-01-01

## 2019-01-01 RX ORDER — METHIMAZOLE 5 MG/1
2.5 TABLET ORAL DAILY
Status: DISCONTINUED | OUTPATIENT
Start: 2019-01-01 | End: 2019-01-01

## 2019-01-01 RX ORDER — ATENOLOL AND CHLORTHALIDONE TABLET 50; 25 MG/1; MG/1
TABLET ORAL
COMMUNITY
Start: 2017-08-09

## 2019-01-01 RX ORDER — IPRATROPIUM BROMIDE AND ALBUTEROL SULFATE 2.5; .5 MG/3ML; MG/3ML
3 SOLUTION RESPIRATORY (INHALATION) ONCE AS NEEDED
Status: ACTIVE | OUTPATIENT
Start: 2019-01-01 | End: 2019-01-01

## 2019-01-01 RX ORDER — METHYLPREDNISOLONE SODIUM SUCCINATE 125 MG/2ML
80 INJECTION, POWDER, LYOPHILIZED, FOR SOLUTION INTRAMUSCULAR; INTRAVENOUS EVERY 8 HOURS
Status: DISCONTINUED | OUTPATIENT
Start: 2019-01-01 | End: 2019-01-01

## 2019-01-01 RX ORDER — HYDROCODONE BITARTRATE AND ACETAMINOPHEN 5; 325 MG/1; MG/1
1 TABLET ORAL EVERY 8 HOURS PRN
Status: ON HOLD | COMMUNITY
End: 2019-01-01

## 2019-01-01 RX ORDER — WARFARIN SODIUM 2.5 MG/1
2.5 TABLET ORAL
Status: DISCONTINUED | OUTPATIENT
Start: 2019-01-01 | End: 2019-01-01

## 2019-01-01 RX ORDER — WARFARIN SODIUM 2.5 MG/1
1.25 TABLET ORAL
Status: DISCONTINUED | OUTPATIENT
Start: 2019-01-01 | End: 2019-01-01

## 2019-01-01 RX ORDER — DULOXETIN HYDROCHLORIDE 30 MG/1
CAPSULE, DELAYED RELEASE ORAL
Qty: 90 CAPSULE | Refills: 2 | OUTPATIENT
Start: 2019-01-01

## 2019-01-01 RX ORDER — ENOXAPARIN SODIUM 100 MG/ML
30 INJECTION SUBCUTANEOUS EVERY EVENING
Status: DISCONTINUED | OUTPATIENT
Start: 2019-01-01 | End: 2019-01-01

## 2019-01-01 RX ORDER — BUDESONIDE 0.5 MG/2ML
0.5 INHALANT ORAL
Status: DISCONTINUED | OUTPATIENT
Start: 2019-01-01 | End: 2019-01-01

## 2019-01-01 RX ORDER — ARFORMOTEROL TARTRATE 15 UG/2ML
15 SOLUTION RESPIRATORY (INHALATION) 2 TIMES DAILY
Status: DISCONTINUED | OUTPATIENT
Start: 2019-01-01 | End: 2019-01-01

## 2019-01-01 RX ORDER — HEPARIN SODIUM 5000 [USP'U]/ML
5000 INJECTION, SOLUTION INTRAVENOUS; SUBCUTANEOUS EVERY 12 HOURS SCHEDULED
Status: DISCONTINUED | OUTPATIENT
Start: 2019-01-01 | End: 2019-01-01

## 2019-01-01 RX ORDER — IPRATROPIUM BROMIDE AND ALBUTEROL SULFATE 2.5; .5 MG/3ML; MG/3ML
3 SOLUTION RESPIRATORY (INHALATION) EVERY 4 HOURS PRN
Status: DISCONTINUED | OUTPATIENT
Start: 2019-01-01 | End: 2019-01-01

## 2019-01-01 RX ORDER — BUDESONIDE AND FORMOTEROL FUMARATE DIHYDRATE 160; 4.5 UG/1; UG/1
AEROSOL RESPIRATORY (INHALATION)
COMMUNITY
Start: 2011-06-15

## 2019-01-01 RX ORDER — BISACODYL 10 MG
10 SUPPOSITORY, RECTAL RECTAL
Status: DISCONTINUED | OUTPATIENT
Start: 2019-01-01 | End: 2019-01-01

## 2019-01-01 RX ORDER — WARFARIN SODIUM 2.5 MG/1
2.5 TABLET ORAL NIGHTLY
Status: DISCONTINUED | OUTPATIENT
Start: 2019-01-01 | End: 2019-01-01 | Stop reason: ALTCHOICE

## 2019-01-01 RX ORDER — HYDROCODONE BITARTRATE AND ACETAMINOPHEN 5; 325 MG/1; MG/1
1 TABLET ORAL EVERY 8 HOURS PRN
Qty: 12 TABLET | Refills: 0 | Status: SHIPPED | OUTPATIENT
Start: 2019-01-01

## 2019-01-01 RX ORDER — PREDNISONE 10 MG/1
10 TABLET ORAL DAILY
Status: ON HOLD | COMMUNITY
End: 2019-01-01

## 2019-01-01 RX ORDER — ATENOLOL 25 MG/1
12.5 TABLET ORAL ONCE
Status: COMPLETED | OUTPATIENT
Start: 2019-01-01 | End: 2019-01-01

## 2019-01-01 RX ORDER — IPRATROPIUM BROMIDE AND ALBUTEROL SULFATE 2.5; .5 MG/3ML; MG/3ML
3 SOLUTION RESPIRATORY (INHALATION) ONCE
Status: COMPLETED | OUTPATIENT
Start: 2019-01-01 | End: 2019-01-01

## 2019-01-01 RX ORDER — ATENOLOL 25 MG/1
25 TABLET ORAL
Status: DISCONTINUED | OUTPATIENT
Start: 2019-01-01 | End: 2019-01-01

## 2019-01-01 RX ORDER — FUROSEMIDE 20 MG/1
20 TABLET ORAL ONCE
Status: COMPLETED | OUTPATIENT
Start: 2019-01-01 | End: 2019-01-01

## 2019-01-01 RX ORDER — PREDNISONE 10 MG/1
30 TABLET ORAL DAILY
Status: ON HOLD | COMMUNITY
Start: 2018-01-01 | End: 2019-01-01

## 2019-01-01 RX ORDER — PREDNISONE 10 MG/1
20 TABLET ORAL DAILY
Status: ON HOLD | COMMUNITY
End: 2019-01-01

## 2019-01-01 RX ORDER — POTASSIUM CHLORIDE 20 MEQ/1
40 TABLET, EXTENDED RELEASE ORAL ONCE
Status: COMPLETED | OUTPATIENT
Start: 2019-01-01 | End: 2019-01-01

## 2019-01-01 RX ORDER — ARFORMOTEROL TARTRATE 15 UG/2ML
15 SOLUTION RESPIRATORY (INHALATION)
Status: DISCONTINUED | OUTPATIENT
Start: 2019-01-01 | End: 2019-01-01 | Stop reason: CLARIF

## 2019-01-01 RX ORDER — ALBUTEROL SULFATE 2.5 MG/3ML
10 SOLUTION RESPIRATORY (INHALATION) CONTINUOUS
Status: DISCONTINUED | OUTPATIENT
Start: 2019-01-01 | End: 2019-01-01

## 2019-01-01 RX ORDER — PREDNISONE 10 MG/1
TABLET ORAL
Qty: 65 TABLET | Refills: 0 | Status: ON HOLD | OUTPATIENT
Start: 2019-01-01 | End: 2019-01-01

## 2019-01-01 RX ORDER — METHYLPREDNISOLONE SODIUM SUCCINATE 40 MG/ML
40 INJECTION, POWDER, LYOPHILIZED, FOR SOLUTION INTRAMUSCULAR; INTRAVENOUS EVERY 12 HOURS
Status: COMPLETED | OUTPATIENT
Start: 2019-01-01 | End: 2019-01-01

## 2019-01-01 RX ORDER — METHYLPREDNISOLONE SODIUM SUCCINATE 40 MG/ML
40 INJECTION, POWDER, LYOPHILIZED, FOR SOLUTION INTRAMUSCULAR; INTRAVENOUS EVERY 8 HOURS
Status: DISCONTINUED | OUTPATIENT
Start: 2019-01-01 | End: 2019-01-01

## 2019-01-01 RX ORDER — WARFARIN SODIUM 5 MG/1
5 TABLET ORAL
Status: COMPLETED | OUTPATIENT
Start: 2019-01-01 | End: 2019-01-01

## 2019-01-01 RX ORDER — ATENOLOL AND CHLORTHALIDONE 50; 25 MG/1; MG/1
0.5 TABLET ORAL DAILY
Status: DISCONTINUED | OUTPATIENT
Start: 2019-01-01 | End: 2019-01-01 | Stop reason: RX

## 2019-01-01 RX ORDER — ARFORMOTEROL TARTRATE 15 UG/2ML
15 SOLUTION RESPIRATORY (INHALATION)
Status: DISCONTINUED | OUTPATIENT
Start: 2019-01-01 | End: 2019-01-01

## 2019-01-01 RX ORDER — MORPHINE SULFATE 4 MG/ML
2 INJECTION, SOLUTION INTRAMUSCULAR; INTRAVENOUS EVERY 2 HOUR PRN
Status: DISCONTINUED | OUTPATIENT
Start: 2019-01-01 | End: 2019-01-01

## 2019-01-01 RX ORDER — ATENOLOL AND CHLORTHALIDONE 50; 25 MG/1; MG/1
0.5 TABLET ORAL DAILY
Status: ON HOLD | COMMUNITY
Start: 2018-11-05 | End: 2019-01-01

## 2019-01-01 RX ORDER — ARFORMOTEROL TARTRATE 15 UG/2ML
15 SOLUTION RESPIRATORY (INHALATION) 2 TIMES DAILY
Qty: 60 VIAL | Refills: 11 | Status: ON HOLD | OUTPATIENT
Start: 2019-01-01 | End: 2019-01-01

## 2019-01-01 RX ORDER — SODIUM CHLORIDE 9 MG/ML
INJECTION, SOLUTION INTRAVENOUS CONTINUOUS
Status: ACTIVE | OUTPATIENT
Start: 2019-01-01 | End: 2019-01-01

## 2019-01-01 RX ORDER — SORBITOL SOLUTION 70 %
30 SOLUTION, ORAL MISCELLANEOUS ONCE
Status: COMPLETED | OUTPATIENT
Start: 2019-01-01 | End: 2019-01-01

## 2019-01-01 RX ORDER — CHLORTHALIDONE 50 MG/1
12.5 TABLET ORAL
Status: DISCONTINUED | OUTPATIENT
Start: 2019-01-01 | End: 2019-01-01

## 2019-01-01 RX ORDER — SODIUM CHLORIDE 9 MG/ML
INJECTION, SOLUTION INTRAVENOUS CONTINUOUS
Status: DISCONTINUED | OUTPATIENT
Start: 2019-01-01 | End: 2019-01-01

## 2019-01-01 RX ORDER — BUDESONIDE 0.5 MG/2ML
0.5 INHALANT ORAL
Status: DISCONTINUED | OUTPATIENT
Start: 2019-01-01 | End: 2019-01-01 | Stop reason: CLARIF

## 2019-01-01 RX ORDER — DULOXETIN HYDROCHLORIDE 30 MG/1
60 CAPSULE, DELAYED RELEASE ORAL DAILY
Status: DISCONTINUED | OUTPATIENT
Start: 2019-01-01 | End: 2019-01-01

## 2019-01-01 RX ORDER — HYDROCODONE BITARTRATE AND ACETAMINOPHEN 5; 325 MG/1; MG/1
1 TABLET ORAL EVERY 4 HOURS PRN
Status: DISCONTINUED | OUTPATIENT
Start: 2019-01-01 | End: 2019-01-01

## 2019-01-01 RX ORDER — ALPRAZOLAM 0.25 MG/1
0.25 TABLET ORAL ONCE
Status: COMPLETED | OUTPATIENT
Start: 2019-01-01 | End: 2019-01-01

## 2019-01-01 RX ORDER — METHYLPREDNISOLONE SODIUM SUCCINATE 125 MG/2ML
125 INJECTION, POWDER, LYOPHILIZED, FOR SOLUTION INTRAMUSCULAR; INTRAVENOUS ONCE AS NEEDED
Status: COMPLETED | OUTPATIENT
Start: 2019-01-01 | End: 2019-01-01

## 2019-01-01 RX ORDER — HYDROCHLOROTHIAZIDE 12.5 MG/1
12.5 CAPSULE, GELATIN COATED ORAL
Status: DISCONTINUED | OUTPATIENT
Start: 2019-01-01 | End: 2019-01-01

## 2019-01-01 RX ORDER — MORPHINE SULFATE 4 MG/ML
1 INJECTION, SOLUTION INTRAMUSCULAR; INTRAVENOUS EVERY 2 HOUR PRN
Status: DISCONTINUED | OUTPATIENT
Start: 2019-01-01 | End: 2019-01-01

## 2019-01-01 RX ORDER — HEPARIN SODIUM 5000 [USP'U]/ML
5000 INJECTION, SOLUTION INTRAVENOUS; SUBCUTANEOUS EVERY 8 HOURS SCHEDULED
Status: DISCONTINUED | OUTPATIENT
Start: 2019-01-01 | End: 2019-01-01 | Stop reason: ALTCHOICE

## 2019-01-01 RX ORDER — METHYLPREDNISOLONE SODIUM SUCCINATE 125 MG/2ML
125 INJECTION, POWDER, LYOPHILIZED, FOR SOLUTION INTRAMUSCULAR; INTRAVENOUS ONCE
Status: COMPLETED | OUTPATIENT
Start: 2019-01-01 | End: 2019-01-01

## 2019-01-01 RX ORDER — DOCUSATE SODIUM 100 MG/1
100 CAPSULE, LIQUID FILLED ORAL 2 TIMES DAILY
Status: DISCONTINUED | OUTPATIENT
Start: 2019-01-01 | End: 2019-01-01

## 2019-01-01 RX ORDER — IPRATROPIUM BROMIDE AND ALBUTEROL SULFATE 2.5; .5 MG/3ML; MG/3ML
3 SOLUTION RESPIRATORY (INHALATION)
Qty: 240 ML | Refills: 1 | Status: SHIPPED | OUTPATIENT
Start: 2019-01-01

## 2019-01-01 RX ORDER — PREDNISONE 20 MG/1
TABLET ORAL
Qty: 21 TABLET | Refills: 0 | Status: SHIPPED | OUTPATIENT
Start: 2019-01-01 | End: 2019-01-01 | Stop reason: CLARIF

## 2019-01-01 RX ORDER — HYDROCODONE BITARTRATE AND ACETAMINOPHEN 5; 325 MG/1; MG/1
1 TABLET ORAL ONCE
Status: COMPLETED | OUTPATIENT
Start: 2019-01-01 | End: 2019-01-01

## 2019-01-01 RX ORDER — ATENOLOL AND CHLORTHALIDONE 50; 25 MG/1; MG/1
0.5 TABLET ORAL DAILY
Qty: 90 TABLET | Refills: 0 | OUTPATIENT
Start: 2019-01-01

## 2019-01-01 RX ORDER — ENOXAPARIN SODIUM 100 MG/ML
40 INJECTION SUBCUTANEOUS DAILY
Status: CANCELLED | OUTPATIENT
Start: 2019-01-01

## 2019-01-01 RX ORDER — ATENOLOL AND CHLORTHALIDONE 50; 25 MG/1; MG/1
0.5 TABLET ORAL DAILY
Status: DISCONTINUED | OUTPATIENT
Start: 2019-01-01 | End: 2019-01-01 | Stop reason: SDUPTHER

## 2019-01-02 NOTE — RESPIRATORY THERAPY NOTE
Pt. Arrived around 0200 am to floor, pt off bipap and appears very comfortable , denies respiratory distress, Neb tx ordered q6prn, will change to q4 scheduled.     [address] with wheeze, Will contiue to monitor, ABGS this am    rn

## 2019-01-02 NOTE — CM/SW NOTE
01/02/19 1300   CM/SW Referral Data   Referral Source Nurse   Reason for Referral Protocol order set   Specify order set COPD   Informant Patient   Patient Info   Patient's Mental Status Alert;Oriented   Patient's [address]   Number of Le[place]

## 2019-01-02 NOTE — PROGRESS NOTES
Pharmacy Note: Renal dose adjustment for Metoclopramide (Reglan)  Joshua Sifuentes has been prescribed Metoclopramide (Reglan) 10 mg every 8 hours as needed. Estimated Creatinine Clearance: 22.4 mL/min (A) (based on SCr of 1.48 mg/dL (H)).     Her calcul

## 2019-01-02 NOTE — ED PROVIDER NOTES
Patient Seen in: [facility] Emergency Department    History   Patient presents with:  Dyspnea BETTY SOB (respiratory)    Stated Complaint: BETTY    HPI    Pleasant 25-year-old with COPD here with shortness of breath. Her daughters at bedside.   She is u . Biopsy of all three benign   • Osteoarthrosis, unspecified whether generalized or localized, unspecified site    • Pacemaker     Pacemaker placed in 2004 at [facility]   • PONV (postoperative nausea and vomiting)    • Right adrenal mass ([facility] [address]     Dx in 2011: %   O2 Device 01/01/19 2052 Bi-PAP       Current:/63   Pulse 62   Temp 97.4 °F (36.3 °C)   Resp 16   Wt 55 kg   SpO2 100%   BMI 19.57 kg/m²         Physical Exam    GENERAL APPEARANCE:     Well developed, well nourished, alert, pleasant elderly femal Abnormality         Status                     ---------                               -----------         ------                     CBC W/ DIFFERENTIAL[[id number]]          Abnormal            Final result                 Plea

## 2019-01-02 NOTE — PROGRESS NOTES
[facility]  Progress Note     Mattie Stein Patient Status:  Inpatient    1928 MRN [id number]   [facility] 2NE-A Attending Mic Agee MD   Hosp Day # 0 PCP Aggie Muñiz     Chief Complaint: dyspnea    S: Patient feels chronic hypoxic respiratory failure  1. Reps status back to baseline  2. Cont. o2  2. Acute COPD exacerbation  1. Wean IV steroids  2. Cont. Nebs  3. A. Fib s/p pacer  1. Cont. BB  4. Coagulopathy  1. Hold coumadin  2. Monitor INR  5. HTN  6. DM  7.  Hypert

## 2019-01-02 NOTE — H&P
[facility]  History and Physical     Marizol Elio Patient Status:  Emergency    1928 MRN [id number]   Location [address] [facility] Attending Tamara Samayoa MD   Hosp Day # 0 PCP Amanda Perez     Chief Complaint: SO left inferior nodule of 1.6 cm with microcalcifications .  Biopsy of all three benign   • Osteoarthrosis, unspecified whether generalized or localized, unspecified site    • Pacemaker     Pacemaker placed in 2004 at [facility]   • PONV (postoperative nausea and sometimes  Darvon [Bexophene]      RASH  Iv Dye [Radiology C*        Comment:Unsure reaction  Msg [Monosodium Glu*    OTHER (SEE COMMENTS)    Comment:Watering eyes and \"acts out\"  Naproxen                DIARRHEA  Percocet [Oxycodone*    RASH  Ultracet [ puffs into the lungs 2 (two) times daily. Disp:  Rfl:        Review of Systems:   A comprehensive 14 point review of systems was completed. Pertinent positives and negatives noted in the HPI. Physical Exam:    /80   Pulse 61   Temp 97.4 °F (36. protocol with correction factor insulin. Will hold metformin. 4. Hypertension-we will continue atenolol. 5. Hyperthyroidism-we will continue on methimazole.   6. Atrial fibrillation-we will continue on atenolol    Quality:  · DVT Prophylaxis: SCD's  · CO

## 2019-01-02 NOTE — CONSULTS
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 Disp: 8 g Rfl: 1 Unknown at Unknown time   Loratadine (CLARITIN REDITABS OR) Take 1 tablet by mouth daily as needed.  Disp:  Rfl:  Past Month at Unknown t Review:  Recent Labs   Lab  01/01/19 2123  01/02/19   0532   RBC  3.71*  3.62*   HGB  10.9*  10.3*   HCT  34.1  33.2*   MCV  91.9  91.7   MCH  29.4  28.5   MCHC  32.0  31.0   RDW  14.3  14.1   NEPRELIM  4.75  6.08   WBC  6.6  6.8   PLT  167.0  182.0

## 2019-01-03 NOTE — CM/SW NOTE
SW received order that Pt needs nebulized medications -   Brovana 15 mcg/2ml, nebulized BID   Budesonide 0.5mg/2ml, nebulized BID      Discussed with unit RN. RN confirmed that Pt has a home nebulizer already, MD wanting to know cost of medications.      Ca

## 2019-01-03 NOTE — PROGRESS NOTES
[facility]  Progress Note    Elisabeth Alvarez Patient Status:  Inpatient    1928 MRN [id number]   [facility] 2NE-A Attending Hugh Saldana, DO   Hosp Day # 1 PCP Dionne [place]     Subjective:  Elisabeth Alvarez is a(n) 80year old No results for input(s): MG in the last 168 hours.     Lab Results   Component Value Date    PHOS 3.2 09/13/2017        Recent Labs   Lab  01/02/19   0532  01/03/19   0527   INR  4.59*  1.01       No results for input(s): ABGPHT, MASXGI1I, ZPUPF3G, AB

## 2019-01-03 NOTE — PROGRESS NOTES
[facility]  Progress Note     Chloe Brito Patient Status:  Inpatient    1928 MRN [id number]   [facility] 2NE-A Attending Patrecia Cooks, MD   Hosp Day # 1 PCP Kristen Lezama     Chief Complaint: dyspnea    S: Patient seen 3 mL Nebulization Q6H WA   • atenolol  25 mg Oral Daily Beta Blocker   • Arformoterol Tartrate  15 mcg Nebulization 2 times daily    And   • budesonide  0.5 mg Nebulization 2 times daily       ASSESSMENT / PLAN:     1.  Acute on chronic hypoxic respiratory

## 2019-01-04 NOTE — PLAN OF CARE
Patient o2 4l/nc o2. Weaned to 2l/nc and 02 sats 96%. Patient uses 2-3 liters at home. Will ambulate patient and assess O2 requirements. Daughter at bedside and updated on POC    At  [phone], patient up to walk in the halls. C/o dizziness and \"feels weak. \"

## 2019-01-04 NOTE — PROGRESS NOTES
[facility]  Progress Note    Keshawn Mckinnon Patient Status:  Inpatient    1928 MRN [id number]   [facility] 2NE-A Attending Winnie Newman, [address] [facility] Day # 2 PCP Shelly [place]     Subjective:  Keshawn Mckinnon is a(n) 80year old CREATSERUM  1.48*  1.34*   GFRAA  36*  40*   GFRNAA  31*  35*   CA  8.8  8.8   NA  139  139   K  4.7  4.3   CL  103  103   CO2  32.0  27.0     No results for input(s): ABGPHT, HPRKZW7L, ZKREE8Y, ABGHCO3, ABGBE, TEMP, STANLEY, SITE, DEV, THGB in the last 16

## 2019-01-04 NOTE — PROGRESS NOTES
[facility]  Progress Note     Alexandria Jason Patient Status:  Inpatient    1928 MRN [id number]   [facility] 2NE-A Attending Agnes Bermeo MD   Hosp Day # 2 PCP Kathia Hendrickson     Chief Complaint: dyspnea    S: Patient seen methimazole  2.5 mg Oral Daily   • MethylPREDNISolone Sodium Succ  40 mg Intravenous Q12H   • ipratropium-albuterol  3 mL Nebulization Q6H WA   • atenolol  25 mg Oral Daily Beta Blocker   • Arformoterol Tartrate  15 mcg Nebulization 2 times daily    And

## 2019-01-04 NOTE — CM/SW NOTE
Will send nebulizer scripts to [facility] to run under patient's part b coverage. Per rn, physician has given samples. Patient and daughter updated, very concerned with cost. Will have [facility] call them w/ whitman.     Lizzy Pandey RN,   Phone [phone]

## 2019-01-05 NOTE — RESPIRATORY THERAPY NOTE
Spoke with Pt regarding education on Arnuity inhaler for home use per MD order. Pt refusing to use medication stating that she \"gets thrush from all of the powders. \" Went through proper technique of DPI use, and explained thorough oral care to Pt and Pt'

## 2019-01-05 NOTE — CM/SW NOTE
Lizzette Kincaid at [address] [phone] about getting price for nebulizer and medications listed in order. Faxed order to  [phone] at [facility]; however, Lopez Torres said they need a script.   Called RN Shine Biggs to gurpreet HALL to put in a script for the

## 2019-01-05 NOTE — PROGRESS NOTES
Attempted to fax prescriptions to [facility] @ [phone], still in progress. Called in prescription for Arformeterol, Budesonide, and Duonebs to [facility] at [phone].  Per pharmacist, Budesonide will cost $182.82, Arformeterol $633.13,

## 2019-01-05 NOTE — PROGRESS NOTES
[facility]  Progress Note    Cortney James Patient Status:  Inpatient    1928 MRN [id number]   [facility] 2NE-A Attending Russel Guidry, DO   Hosp Day # 3 PCP Rachel [place]     Subjective:  Cortney James is a(n) 80year old 182.0  139.0*     Recent Labs   Lab  01/01/19 2123 01/02/19   0532  01/05/19   0551   GLU  157*  242*  114*   BUN  37*  36*  29*   CREATSERUM  1.48*  1.34*  1.08*   GFRAA  36*  40*  52*   GFRNAA  31*  35*  45*   CA  8.8  8.8  8.5   NA  139  139  138   K

## 2019-01-05 NOTE — PROGRESS NOTES
Explained discharge instructions including medications and follow ups to the patient and her daughter, verbalize understanding, IV removed, tele monitor discontinued, will be transported via wheelchair.

## 2019-01-05 NOTE — DISCHARGE SUMMARY
[facility] HOSPITALIST  DISCHARGE SUMMARY     Lidya Morfin Patient Status:  Inpatient    1928 MRN [id number]   [facility] 2NE-A Attending Casey Cosme, [address] [facility] Day # 3 PCP Dulce Arellano     Date of Admission: 2019  Date of Disc 2 (two) times daily.    Quantity:  60 vial  Refills:  11        CHANGE how you take these medications      Instructions Prescription details   predniSONE 20 MG Tabs  Commonly known as:  Favian Muhammad  What changed:    · medication strength  · how much to take tablet  Refills:  1     Warfarin Sodium 2.5 MG Tabs  Commonly known as:  COUMADIN      Take 2.5 mg by mouth See Admin Instructions.  Take 2.5 mg on Monday, Wednesday, Thursday, Friday, Saturday, Sunday   Refills:  0     Warfarin Sodium 2.5 MG Tabs  Commonly

## 2019-01-05 NOTE — PLAN OF CARE
CARDIOVASCULAR - ADULT    • Maintains optimal cardiac output and hemodynamic stability Progressing    • Absence of cardiac arrhythmias or at baseline Progressing          MUSCULOSKELETAL - ADULT    • Return mobility to safest level of function Progressing

## 2019-01-22 NOTE — PROGRESS NOTES
NURSING ADMISSION NOTE      Patient admitted via Cart  Oriented to room. Safety precautions initiated. Bed in low position. Call light in reach. daughter at bedside. Dr Eve Burger on floor will place orders.

## 2019-01-22 NOTE — H&P
[facility]  History and Physical     Elisabeth Alvarez Patient Status:  Observation    1928 MRN [id number]   [facility] 5NW-A Attending Khai Ayala MD   Hosp Day # 0 PCP Cherelle Bentley     Chief Complaint: SOB    History o U/S showed right upper nodule of 1.1 cm, right midpole nodule of 3.2 cm and left inferior nodule of 1.6 cm with microcalcifications .  Biopsy of all three benign   • Osteoarthrosis, unspecified whether generalized or localized, unspecified site    • Pacemak Inhibitors          UNKNOWN  Allergy                 RASH    Comment:Occurs sometimes  Darvon [Bexophene]      RASH  Iv Dye [Radiology C*        Comment:Unsure reaction  Msg [Monosodium Glu*    OTHER (SEE COMMENTS)    Comment:Watering eyes and \"acts out\" and negatives noted in the HPI. Physical Exam:    /72   Pulse 102   Temp 96.9 °F (36.1 °C) (Temporal)   Resp 20   SpO2 91%   General: No acute distress. Alert and oriented x 3. HEENT: Normocephalic atraumatic. Moist mucous membranes. EOM-I.  PERRL MD  1/22/2019

## 2019-01-22 NOTE — ED PROVIDER NOTES
Patient Seen in: [facility] Emergency Department    History   Patient presents with:  Dyspnea BETTY SOB (respiratory)    Stated Complaint: Shortness of Breath    HPI    Geoffrey Barone is a pleasant 54-year-old female presenting to the emergency department right adrenal mass measuring between 1 and 1.5 that seems to be an adenoma and  is stable on CT scans   • Shortness of breath    • Subclinical hyperthyroidism     Dx in 6/2013 - due to multinodular goiter   • Type II or unspecified type diabetes mellitus w shows regular rhythm abdomen soft nontender extremities no copy cyanosis or edema no rash       ED Course     Labs Reviewed   COMP METABOLIC PANEL (14) - Abnormal; Notable for the following components:       Result Value    Glucose 170 (*)     BUN 26 (*) and breathing treatment here to emerge department she still has some wheezing noted occasionally.   She will be admitted for further evaluation  Admission disposition: 1/22/2019 12:38 PM                 Disposition and Plan     Clinical Impression:  COPD ex

## 2019-01-22 NOTE — PROGRESS NOTES
[facility] Pharmacy Note:  Renal Dose Adjustment for Enoxaparin (LOVENOX)    Lidya Morfin has been prescribed Enoxaparin (LOVENOX) 40 mg subcutaneously every 24 hours. Estimated Creatinine Clearance: 24.8 mL/min (A) (based on SCr of 1.18 mg/dL (H)).     Her

## 2019-01-22 NOTE — ED NOTES
Nurse to Nurse Report given to Yudi Calzada RN, [id number]. Per Gene Mendosa she had to call-in the standby nurse who should arrive at 2pm. There is no nurse for the patient until then.   Will arrange transport at 2pm.

## 2019-01-22 NOTE — ED INITIAL ASSESSMENT (HPI)
Pt presented to ED via EMS c/o SOB. Pt O2 RA 93%. Pt appears to be anxious calling out for help \"God Help Me\". Pt does have audible wheezing.

## 2019-01-22 NOTE — CONSULTS
[facility]  Report of Consultation    Marizol Ballard Patient Status:  Observation    1928 MRN [id number]   [facility] [place] 5NW-A Attending Indira Cardozo MD   Hosp Day # 0 PCP Amanda Perez     Reason for Consultation:  aecopd has severe pain in her hip last 2 days               History:  Past Medical History:   Diagnosis Date   • Arrhythmia     Atrial Fib   • Atrial fibrillation (HCC)    • Breast cancer (HCC)     Left breast cancer tx with mastectomy - no chemo or radiation   • cataract extractions   • COLONOSCOPY     • ESOPHAGOGASTRODUODENOSCOPY (EGD) N/A 9/12/2017    Performed by Nay Sharma MD at [facility] ENDOSCOPY   • YOAV NEEDLE LOCALIZATION W/ SPECIMEN 1 SITE RIGHT     • MASTECTOMY LEFT      Left mastectomy at age 54   • N mg, 2.5 mg, Oral, See Admin Instructions  •  ipratropium-albuterol (DUONEB) nebulizer solution 3 mL, 3 mL, Nebulization, Q6H PRN  •  MethylPREDNISolone Sodium Succ (Solu-MEDROL) injection 80 mg, 80 mg, Intravenous, Q8H  •  ondansetron HCl (ZOFRAN) injectio lesions.    Neurological: Alert, interactive, no focal deficit    Vital signs in last 24 hours:  Patient Vitals for the past 24 hrs:   BP Temp Temp src Pulse Resp SpO2   01/22/19 1539 133/72 98.7 °F (37.1 °C) Oral 107 18 94 %   01/22/19 1315 127/72 — — 102 hypertension  10. Subclinical hypothyroidism-diagnosed in June 2013 with chronic lymphocytic thyroiditis  11. Type 2 diabetes mellitus  12. By my estimation, some degree of dementia  13. Hip pain        Plan     1.  Steroids , start with IV x24 h , then puja

## 2019-01-22 NOTE — PROGRESS NOTES
[facility] Pharmacy Note:  Renal Dose Adjustment for Metoclopramide (REGLAN)    Omar Srinivasan has been prescribed Metoclopramide (REGLAN) 10 mg every 8 hours as needed for nausea/vomiting.     Estimated Creatinine Clearance: 24.8 mL/min (A) (based on SCr of 1.1

## 2019-01-23 NOTE — CM/SW NOTE
Attempted to meet with pt, however pt off floor for testing. Met with pt's dtr, Carmine Granado who provided history. Pt lives with her dtr in a one level [facility]. Pt's dtr is home 24 hrs and assists pt as needed. Pt is current with [facility] [place] for RN only.

## 2019-01-23 NOTE — PROGRESS NOTES
[facility]  Progress Note    Alexandria Jason Patient Status:  Inpatient    1928 MRN [id number]   [facility] 5NW-A Attending Tiny Cantu MD   Hosp Day # 1 PCP Margaret [place]     Subjective:  Alexandria Jason is a(n) 80 year ol CREATSERUM  1.18*  1.56*   GFRAA  47*  33*   GFRNAA  41*  29*   CA  8.8  9.4   NA  140  135*   K  4.1  4.8   CL  105  101   CO2  29.0  27.0     No results for input(s): ABGPHT, ILUOFK9C, SDNJN9H, ABGHCO3, ABGBE, TEMP, STANLEY, SITE, DEV, THGB in the last 1 employed but seems historically reluctant to bring the patient to the office. We will work to contact home care services the patient does avail herself of. We will also work to provide her with samples.     Eric Dick SR.  1/23/2019  10:25 AM

## 2019-01-23 NOTE — PROGRESS NOTES
[facility]  Progress Note     Lovely Bates Patient Status:  Inpatient    1928 MRN [id number]   [facility] 5NW-A Attending Katie Reyes MD   Hosp Day # 1 PCP Jaimee Ventura     Chief Complaint: SOB    S: Patient feeling a mg Oral Daily   • Warfarin Sodium  2.5 mg Oral Nightly   • Insulin Aspart Pen  1-10 Units Subcutaneous TID AC and HS   • enoxaparin  30 mg Subcutaneous QPM   • atenolol  25 mg Oral Daily Beta Blocker    And   • chlorthalidone  12.5 mg Oral Daily Beta Block

## 2019-01-23 NOTE — CONSULTS
INFECTIOUS DISEASE [facility] [place] Patient Status:  Inpatient    1928 MRN [id number]   [facility] 5NW-A Attending Juni Parks MD   Hosp Day # 1 PCP [facility] unspecified site    • Pacemaker     Pacemaker placed in 2004 at [facility]   • PONV (postoperative nausea and vomiting)    • Right adrenal mass ([facility] [address]     Dx in 2011: right adrenal mass measuring between 1 and 1.5 that seems to be an adenoma and  is stable on C DIARRHEA  Percocet [Oxycodone*    RASH  Ultracet [Tramadol-*    RASH    Medications:    Current Facility-Administered Medications:   •  predniSONE (DELTASONE) tab 60 mg, 60 mg, Oral, Daily with breakfast  •  Warfarin Sodium (COUMADIN) tab 5 mg, 5 mg, Oral, PRN    No current facility-administered medications on file prior to encounter. Current Outpatient Medications on File Prior to Encounter:  predniSONE 10 MG Oral Tab Take 10 mg by mouth daily.  Disp:  Rfl:    HYDROcodone-acetaminophen 5-325 MG Oral Tab Ta lymphadenopathy. supple, no masses  Respiratory: Clear to auscultation bilaterally. No wheezes. No rhonchi. Cardiovascular: S1, S2.  Regular rate and rhythm. No murmurs. Abdomen: Soft, nontender, nondistended. Positive bowel sounds.   Musculoskeletal: F Problem list reviewed:  Patient Active Problem List:     Chronic airway obstruction, not elsewhere classified     Unspecified venous (peripheral) insufficiency     Macular degeneration (senile) of retina, unspecified     Benign neoplasm of adrenal gl Chronic obstructive pulmonary disease (HCC)     Type 2 diabetes mellitus without complication, without long-term current use of insulin (HCC)     Atrial fibrillation, unspecified type ([facility]     Community acquired bacterial pneumonia     Chronic narcotic

## 2019-01-23 NOTE — PLAN OF CARE
Patient/Family Goals    • Patient/Family Long Term Goal Progressing    • Patient/Family Short Term Goal Progressing          Pt A/OX4. Anxious at times. . On 2L chronically maintaining O2 sats. Tele. Pacemaker. Voids. Up standby w/ cane. IV solumedrol.

## 2019-01-24 NOTE — PROGRESS NOTES
[facility]                INFECTIOUS DISEASE PROGRESS NOTE    [facility] Patient Status:  Inpatient    1928 MRN [id number]   [facility] 5NW-A Attending Annemarie Bustillos MD   Hosp Day # 2 PCP [facility] Abnormal     Comment: Smear and PCR Identification performed by Masha Yap         Strep species detected by PCR Abnormal       Hospital Encounter on 01/22/19   1.  BLOOD CULTURE     Status: None (Preliminary result)    Collection Time: 01/22/19  3:26 PM   Resu Type 2 diabetes mellitus with complication, without long-term current use of insulin (HCC)     Malignant neoplasm of female breast ([address]     Malignant neoplasm of lung (HCC)     Hyponatremia     Anemia of chronic disease     Renal insufficiency     Subther

## 2019-01-24 NOTE — PLAN OF CARE
Patient/Family Goals    • Patient/Family Long Term Goal Progressing    • Patient/Family Short Term Goal Progressing        RESPIRATORY - ADULT    • Achieves optimal ventilation and oxygenation Progressing          Pt A/OX4.  On 2L O2 chronically maintaining

## 2019-01-24 NOTE — PROGRESS NOTES
[facility]  Progress Note     Adanbob Moreno Patient Status:  Inpatient    1928 MRN [id number]   [facility] 5NW-A Attending Aaron Bolden MD   Hosp Day # 2 PCP Juany Leon     Chief Complaint: SOB    S: Patient appears c methimazole  2.5 mg Oral Daily   • Insulin Aspart Pen  1-10 Units Subcutaneous TID AC and HS   • enoxaparin  30 mg Subcutaneous QPM   • atenolol  25 mg Oral Daily Beta Blocker    And   • chlorthalidone  12.5 mg Oral Daily Beta Blocker   • ipratropium-albut

## 2019-01-24 NOTE — PROGRESS NOTES
[facility]  Progress Note    Elisabeth Alvarez Patient Status:  Inpatient    1928 MRN [id number]   [facility] 5NW-A Attending Khai Ayala MD   [facility] Day # 2 PCP Cherelle Bentley     Subjective:  Elisabeth Alvarez is a(n) 80 year ol PLT  155.0     Recent Labs   Lab  01/22/19   1122  01/23/19   0649   GLU  170*  209*   BUN  26*  37*   CREATSERUM  1.18*  1.56*   GFRAA  47*  33*   GFRNAA  41*  29*   CA  8.8  9.4   NA  140  135*   K  4.1  4.8   CL  105  101   CO2  29.0  27.0     No resu

## 2019-01-25 NOTE — PROGRESS NOTES
Multidisciplinary Discharge Rounds held 1/24/2019. Treatment team members present today include , , Charge Nurse,  Nurse, RT, PT and Pharmacy caring for [employer].      Other care providers present:    Patient Active Probl (INR)     Closed fracture of distal end of right radius with routine healing, unspecified fracture morphology, subsequent encounter     Colitis     Nausea     Coagulopathy (HCC)     Failure to thrive (0-17)     Chronic obstructive pulmonary disease ([facility]

## 2019-01-25 NOTE — PROGRESS NOTES
[facility]                INFECTIOUS DISEASE PROGRESS NOTE    [facility] [place] Patient Status:  Inpatient    1928 MRN [id number]   [facility] 5NW-A Attending Irma Fonseca MD   Hosp Day # 3 PCP [facility] Comment: Smear and PCR Identification performed by [facility]         Strep species detected by PCR Abnormal       Hospital Encounter on 01/22/19   1.  BLOOD CULTURE     Status: None (Preliminary result)    Collection Time: 01/23/19  4:09 PM   Result Value diabetes mellitus with complication, without long-term current use of insulin (HCC)     Malignant neoplasm of female breast ([address]     Malignant neoplasm of lung (HCC)     Hyponatremia     Anemia of chronic disease     Renal insufficiency     Subtherapeutic

## 2019-01-25 NOTE — PROGRESS NOTES
[facility]  Progress Note     Marlene Torres Patient Status:  Inpatient    1928 MRN [id number]   [facility] 5NW-A Attending Javi Mendez MD   Hosp Day # 3 PCP Marguerite Turcios     Chief Complaint: SOB    S: Patient stable DULoxetine HCl  60 mg Oral Daily   • methimazole  2.5 mg Oral Daily   • Insulin Aspart Pen  1-10 Units Subcutaneous TID AC and HS   • enoxaparin  30 mg Subcutaneous QPM   • atenolol  25 mg Oral Daily Beta Blocker    And   • chlorthalidone  12.5 mg Oral Christine Fees

## 2019-01-25 NOTE — PROGRESS NOTES
NURSING DISCHARGE NOTE    Discharged Home via Wheelchair. Accompanied by Family member and Support staff  Belongings Taken by patient/family. Pt and daughter received discharge instructions and education. PIV removed.  Follow up appointments and pre

## 2019-01-25 NOTE — CM/SW NOTE
Patient and daughter requesting information for reducing medication costs. Daughter will research individual med company financial assistance programs. Good Rx card given to daughter. Daughter requesting HME tank from [facility].  Ok to dispense per

## 2019-01-25 NOTE — PLAN OF CARE
Patient/Family Goals    • Patient/Family Long Term Goal Progressing    • Patient/Family Short Term Goal Progressing        RESPIRATORY - ADULT    • Achieves optimal ventilation and oxygenation Progressing          Pt is alert and oriented x 3-4.  Forgetful

## 2019-01-25 NOTE — PROGRESS NOTES
Multidisciplinary Discharge Rounds held 1/25/2019. Treatment team members present today include , , Charge Nurse,  Nurse, RT, PT and Pharmacy caring for [employer].      Other care providers present:    Patient Active Probl (INR)     Closed fracture of distal end of right radius with routine healing, unspecified fracture morphology, subsequent encounter     Colitis     Nausea     Coagulopathy (HCC)     Failure to thrive (0-17)     Chronic obstructive pulmonary disease ([facility] [address]

## 2019-01-25 NOTE — PROGRESS NOTES
[facility]  Progress Note    Tashia Munoz Patient Status:  Inpatient    1928 MRN [id number]   [facility] 5NW-A Attending Shayna Woodall MD   [facility] Day # 3 PCP [place]     Subjective:  Tashia Munoz is a(n) 80 year ol 0649   GLU  170*  209*   BUN  26*  37*   CREATSERUM  1.18*  1.56*   GFRAA  47*  33*   GFRNAA  41*  29*   CA  8.8  9.4   NA  140  135*   K  4.1  4.8   CL  105  101   CO2  29.0  27.0     No results for input(s): ABGPHT, UXXWMF4Y, YIUEN3E, ABGHCO3, ABGBE, TEM

## 2019-01-25 NOTE — PLAN OF CARE
Patient/Family Goals    • Patient/Family Long Term Goal Progressing    • Patient/Family Short Term Goal Progressing        RESPIRATORY - ADULT    • Achieves optimal ventilation and oxygenation Progressing            AOx3-4, forgetful at times.   Here for CO

## 2019-01-26 NOTE — DISCHARGE SUMMARY
[facility]OSPITAL[facility]  DISCHARGE SUMMARY     Omar Mattson Patient Status:  Inpatient    1928 MRN [id number]   [facility] 5NW-A Attending No att. providers found   Hosp Day # 3 PCP Juan Bustillo     Date of Admission: 2019  Date Risk of readmission after discharge from the hospital.    Procedures during hospitalization:   • none    Incidental or significant findings and recommendations (brief descriptions):  • none    Lab/Test results pending at Discharge:   · none    Consultants: 0     Ondansetron HCl 4 mg tablet  Commonly known as:  ZOFRAN      Take 1 tablet (4 mg total) by mouth every 8 (eight) hours as needed for Nausea.    Quantity:  30 tablet  Refills:  1     Warfarin Sodium 2.5 MG Tabs  Commonly known as:  COUMADIN      Take 2

## 2019-01-27 NOTE — CM/SW NOTE
Patient discharged on 1/25/19 as previously planned.        01/27/19 1500   Discharge disposition   Expected discharge disposition Home-Health   Name of [address] [place] [address] services after discharge DME;Skilled home care   HME pro

## 2019-03-28 NOTE — TELEPHONE ENCOUNTER
LOV:5/11/18 TB  FOV:none on file   LAST RX:1/30/18 60 mg take 1 cap daily 90 caps 3 refills   LAST LABS:3/7/19 a1c,cbc,thyroid,cmp  PER PROTOCOL: to provider

## 2019-04-25 PROBLEM — R53.1 WEAKNESS GENERALIZED: Status: ACTIVE | Noted: 2019-01-01

## 2019-04-25 NOTE — ED INITIAL ASSESSMENT (HPI)
Pt here via [facility] for evaluation of fatigue  Per EMS, a well check was performed and pt was found to be lethargic and c/o nausea. (pt lives with her daughter and Daughter admitted last night for Diabetic issues)  Pt states since last night she's b

## 2019-04-26 NOTE — CM/SW NOTE
Re-evaluted plan of care after discussing with Dr. Doni Vasquez who believes admission is not medically necessary and feels like a better plan of care can be brought in place to avoid admission.  Spoke with Edward Yang again and I discussed if keeping a closer eye o

## 2019-04-26 NOTE — ED NOTES
Pt will be discharged,  Harry Hood discussed to family arrangement to take of pt @ home, pt will be taken care of family members & home health.  Pt remained alert & coherent per norm, able to ambulate to washroom with assist

## 2019-04-26 NOTE — CM/SW NOTE
Spoke to Dk who is patient's son. He is unable to stay with mom given that he's self employed and needs to work very often and goes from place to place. Granddaughter (Jarome Kinds) unable to stay at grandmas as well since she works full time.  Pt unable to go

## 2019-04-26 NOTE — ED PROVIDER NOTES
Patient Seen in: [facility] Emergency Department    History   Patient presents with:  Nausea/Vomiting/Diarrhea (gastrointestinal)  Fatigue (constitutional, neurologic)    Stated Complaint: n/v/d weakness    HPI    Patient presents to the emergency d • Pacemaker     Pacemaker placed in 2004 at [facility]   • PONV (postoperative nausea and vomiting)    • Right adrenal mass ([facility] [address]     Dx in 2011: right adrenal mass measuring between 1 and 1.5 that seems to be an adenoma and  is stable on CT scans   • Shortn kg/m²         Physical Exam    -year-old lying in emergency department bed.   She is mildly confused but no acute distress her HEENT exam reveals dry mildly dry oral mucosa her neck is supple her lungs are clear to auscultation her heart has a regular rate paced rhythm, intraventricular conduction delay T wave inversions and depressions V1 V2 V3 V4 V5, these are actually seen on prior EKGs from 2018 as well.               XR CHEST AP PORTABLE  (CPT=71045)   Final Result    PROCEDURE:  XR CHEST AP PORTABLE  (C recuperate.    will be reaching out to the primary care physician tomorrow offered further assistance        Disposition and Plan     Clinical Impression:  Weakness generalized  (primary encounter diagnosis)    Disposition:  Discharge  4/25/2019
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
## 2019-10-04 PROBLEM — S72.001A CLOSED FRACTURE OF RIGHT HIP, INITIAL ENCOUNTER (HCC): Status: ACTIVE | Noted: 2019-01-01

## 2019-10-04 PROBLEM — T07.XXXA MULTIPLE CONTUSIONS: Status: ACTIVE | Noted: 2019-01-01

## 2019-10-04 NOTE — H&P
[facility]  History and Physical     Seward Goodell Patient Status:  Emergency    1928 MRN [id number]   Location [address] [facility] Attending Veronica Acuna MD   Hosp Day # 0 PCP Katy Vinson     Chief Complaint: Jose Juan Suhail generalized or localized, unspecified site    • Pacemaker     Pacemaker placed in 2004 at [facility]   • PONV (postoperative nausea and vomiting)    • Right adrenal mass ([facility] [address]     Dx in 2011: right adrenal mass measuring between 1 and 1.5 that seems to be an a COMMENTS)    Comment:Watering eyes and \"acts out\"  Naproxen                DIARRHEA  Percocet [Oxycodone*    RASH  Ultracet [Tramadol-*    RASH    Medications:    No current facility-administered medications on file prior to encounter.    Current Outpatie /73   Pulse 72   Temp 97.9 °F (36.6 °C) (Temporal)   Resp 19   Ht 165.1 cm (5' 5\")   Wt 125 lb (56.7 kg)   SpO2 100%   BMI 20.80 kg/m²   General: No acute distress. Alert and oriented x 3. HEENT: Normocephalic atraumatic. Moist mucous membranes.

## 2019-10-04 NOTE — PLAN OF CARE
PT. AND PT.S DTR. INSTRUCTED FOR PT. TO BE ON THE BEDREST AND NPO. IV MORPHINE GIVEN FOR PAIN CONTROL. PT. POSITIONED FOR COMFORT AND IV INFUSION STARTED. WILL CALL DR. AKINS FOR ORTHO CONSULT.

## 2019-10-04 NOTE — CM/SW NOTE
Met with pt and her dtr, Estrella Ritchie to discuss DC planning. Pt lives with her dtr and normally ambulates with a cane at home. She is current with [facility] for [place] RN. She has no previous history of NH rehab.   Pt is admitted s/p fall with hip and pelvic f

## 2019-10-04 NOTE — PROGRESS NOTES
NURSING ADMISSION NOTE      Patient admitted via BED  Oriented to room. Safety precautions initiated. Bed in low position. Call light in reach. PT. RECEIVED FROM ER ACCOMPANIED BY DAUGHTER. VSS. ADMITTING CARE RENDERED.  PT. AND PT.S DTR UPDATED ON P

## 2019-10-04 NOTE — PLAN OF CARE
STRAIGHT CATH DONE UNDER STERILE TECHNIQUE JOHNY WELL DARK CASSANDRA URINE UA SENT WITH REFLEX AS ORDER IN ER. ATE DINNER. SEE MAR. PT AGREE TO TAKE NORCO, PT STATES\" [address] Fry WILL NOT WORK VICODIN WILL\" PT INFORMED SAME MEDS .

## 2019-10-04 NOTE — ED NOTES
Pt states pain is \"much better\" at this time, pt states she is only having pain when she moves her right leg. Pt denies need for any additional pain medications at this time.

## 2019-10-04 NOTE — ED PROVIDER NOTES
Patient Seen in: [facility] Emergency Department      History   Patient presents with:  Fall (musculoskeletal, neurologic)    Stated Complaint: fall on coumadin    HPI    Patient is a 19-year-old female who presents the emergency department reportin nodule of 1.6 cm with microcalcifications .  Biopsy of all three benign   • Osteoarthrosis, unspecified whether generalized or localized, unspecified site    • Pacemaker     Pacemaker placed in 2004 at [facility]   • PONV (postoperative nausea and vomiting) (36.6 °C)   Temp src Temporal   SpO2 97 %   O2 Device Nasal cannula       Current:/73   Pulse 72   Temp 97.9 °F (36.6 °C) (Temporal)   Resp 19   Ht 165.1 cm (5' 5\")   Wt 56.7 kg   SpO2 100%   BMI 20.80 kg/m²         Physical Exam    Constitutional: PT 18.8 (*)     INR 1.49 (*)     All other components within normal limits   CBC W/ DIFFERENTIAL - Abnormal; Notable for the following components:    WBC 11.6 (*)     RDW 15.5 (*)     RDW-SD 49.2 (*)     Eosinophil Absolute 1.08 (*)     All other component Disposition and Plan     Clinical Impression:  Fall, initial encounter  (primary encounter diagnosis)  Multiple contusions  Closed fracture of right hip, initial encounter [facility])    Disposition:  Admit  10/4/2019  3:22 am    Follow-up:  Lady Toscano

## 2019-10-04 NOTE — PLAN OF CARE
0730 RECD VERY RESTLESS, C/O PAIN RT HIP. Douglas ,FORGETFUL. DR LUIS GUTIERRES CALLED WILL BE HERE BETWEEN 11 AM AND 1400. PT AND DAUGHTER AWARE OF PLAN . KEEP NPO FOR NOW. CALL LIGHT W/IN REACH. BED ALARM ENGAGED.  DAUGHTER  DAVID INSTRUCTED TO CALL FOR ALL NEEDS

## 2019-10-04 NOTE — PROGRESS NOTES
Pharmacy Note: Renal dose adjustment for Metoclopramide (Reglan)  Dario Jeffery has been prescribed Metoclopramide (Reglan) 10 mg every 8 hours as needed. Estimated Creatinine Clearance: 20.8 mL/min (A) (based on SCr of 1.58 mg/dL (H)).     Her calcul

## 2019-10-04 NOTE — PHYSICAL THERAPY NOTE
PT eval order received, chart reviewed. Pt currently on bedrest and awaiting ortho consult this am. Will check back as schedule allows to complete PT eval as appropriate. RN aware and in agreement.

## 2019-10-04 NOTE — PLAN OF CARE
NOT VOIDED YET, EXTERNAL IN PLACE AND DEPENDS DRY ALSO. BLADDER SCAN > 550 CC. ER ORDER ONCE FOR STRAIGHT CATH AND UA NEEDED.

## 2019-10-04 NOTE — CONSULTS
ORTHO CONSULT NOTE    Patient Identification:        Name: Dario Jeffery  Age: 80year old  Sex: female  :  1928  MRN: [id number]                                              Requesting Physician: Dr. Maddison Walsh      HPI:        Dario Jeffery essential HTN     Diabetes mellitus type 2 in nonobese (HCC)     At risk for falling     CKD (chronic kidney disease), stage III (HCC)     History of left breast cancer     Chronic atrial fibrillation     Malignant neoplasm of left lung (HCC)     Anxiety fibrillation ([facility] [address]    • Breast cancer ([facility] [address]     Left breast cancer tx with mastectomy - no chemo or radiation   • Cancer (HCC)     BREAST AND LUNG   • Chronic lymphocytic thyroiditis     Dx in 6/2013 - TPO Ab positive   • Compression fracture of thoracic v Maribeth Lindsay MD at [facility] ENDOSCOPY   • YOAV LOCALIZATION WIRE 1 SITE RIGHT (ZGL=66640)     • MASTECTOMY LEFT      Left mastectomy at age 54   •       x 3   • OTHER SURGICAL HISTORY      Right wrist fracture repair in    • OTHER SURGICAL HISTORY      Right midd tablets daily for 3 days then 1 tablets daily for 3 days then stop. Disp: 65 tablet Rfl: 0 More than a month at Unknown time   Warfarin Sodium 2.5 MG Oral Tab Take 1.25 mg by mouth daily.  Take 5mg on Tuesday  Disp:  Rfl:  10/1/2019       Current Meds: (SEE COMMENTS)    Comment:Watering eyes and \"acts out\"  Naproxen                DIARRHEA  Percocet [Oxycodone*    RASH  Ultracet [Tramadol-*    RASH    Social History:   Social History    Tobacco Use      Smoking status: Former Smoker        Packs/day: 2 of DVT or compartment syndrome    Exam of the contralateral hip is normal:  No atrophy, erythema, ecchymosis, or gross deformity noted  No tenderness to palpation  Full active range of motion  5/5 strength in hip flexion, abduction, and adduction  Stinchfi surgical intervention  Ok for blood thinner per primary  Pain per primary  Rest of care per primary  Follow up with Dr. Johana Ram after 2-3 weeks after discharge to ensure continued healing      Electronically Signed By: Justin Curry MD, 10/4/2019, 12:32 P

## 2019-10-04 NOTE — ED INITIAL ASSESSMENT (HPI)
Pt presents to ed c/o right hip and bilateral knee pain following a mechanical fall at home this evening. Pt denies loc, denies head or neck injury or pain. States she takes coumadin. Pt is a&ox4, moves all extremities, resps easy.

## 2019-10-04 NOTE — PLAN OF CARE
DR AKINS CANCELLED SX AND TALK TO FAMILY MEMBERS AND PT. CARDIOLOGY APN NOTIFIED.  WILL PAGED DR Andrews Flank

## 2019-10-05 NOTE — PHYSICAL THERAPY NOTE
PHYSICAL THERAPY EVALUATION - INPATIENT     Room Number: 380/380-A  Evaluation Date: 10/5/2019  Type of Evaluation: Initial  Physician Order: PT Eval and Treat    Presenting Problem: R greater trochanter fracture, nondisplaced.    Reason for Therapy: with mastectomy - no chemo or radiation   • Cancer (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 PT session. Pt lying in bed and agreed to PT. BP taken and initially 65/50 in supine. RN notified and present in room. Second BP reading at 118/65. Pt with reports of R hip pain. Pt required constant education regarding current situation.  Pt requires consta limitations in independent bed mobility, transfers, and gait. The patient is below baseline and would benefit from skilled inpatient PT to address the above deficits to assist patient in returning to prior to level of function.   DISCHARGE RECOMMENDATIONS

## 2019-10-05 NOTE — PROGRESS NOTES
Progress Note    Patient: [address] [facility] record #: [id number]    Keshawn Mckinnon is a 80year old  female with right nondisplaced greater trochanter fracture. The patient's main complaint today is pain at the fracture site.  Denies numbness times per day   atenolol (TENORMIN) tab 25 mg 25 mg Oral Daily Beta Blocker   And      hydrochlorothiazide (MICROZIDE) cap 12.5 mg 12.5 mg Oral Daily Beta Blocker   glucose (DEX4) oral liquid 15 g 15 g Oral Q15 Min PRN   Or      Glucose-Vitamin C (DEX-4) c mobility - will defer to primary  Rest of care per primary    Follow up with Dr. Chace Meadows after 2-3 weeks after discharge to ensure continued healing    Followed by [facility] Physician group for medical conditions to include Principal Problem:    Fall, initial en

## 2019-10-05 NOTE — PLAN OF CARE
Plan of care explained and updated with patient and daughter input. Progressing per plan of care. Patient remains forgetful, with reminders and prompting helpful. Oriented to person, place, time and situation with reminders.  On oxygen 2 liters per nasal ca

## 2019-10-05 NOTE — PROGRESS NOTES
[facility]  Progress Note     [facility] Patient Status:  Inpatient    1928 MRN [id number]   [facility] 3SW-A Attending Lola Wyatt MD   Hosp Day # 1 PCP Chanel Murdock     Chief Complaint: Ralph Maher: Patient  Doing w • [START ON 10/6/2019] Warfarin Sodium  2.5 mg Oral Once per day on Sun Mon Wed Thu Fri Sat   • Arformoterol Tartrate  15 mcg Nebulization BID   • DULoxetine HCl  60 mg Oral Daily   • ipratropium-albuterol  3 mL Nebulization QID   • methimazole  2.5 mg O

## 2019-10-05 NOTE — PLAN OF CARE
Alert and oriented x 3, forgetful and needs cues most of the time. Seen by PT this morning, cooperative and was able to do right leg WBAT. Verbalized has pain with mobility. PT recommending ERIK.

## 2019-10-06 NOTE — PLAN OF CARE
Daughter at bedside all the time. Patient alert, forgetful and able to follow simple instructions with cues. Discharge plan to ERIK, daughter to decide a place. Patient up to chair, Norco given prior to PT.   Fall precaution measures initiated with chair

## 2019-10-06 NOTE — PHYSICAL THERAPY NOTE
PHYSICAL THERAPY TREATMENT NOTE - INPATIENT    Room Number: 380/380-A     Session: 1   Number of Visits to Meet Established Goals: 5    Presenting Problem: R greater trochanter fracture, nondisplaced.     History related to current admission:  Pt admitted 2011: right adrenal mass measuring between 1 and 1.5 that seems to be an adenoma and  is stable on CT scans   • Shortness of breath    • Subclinical hyperthyroidism     Dx in 6/2013 - due to multinodular goiter   • Type II or unspecified type diabetes dima difficulty does the patient currently have. ..  -   Turning over in bed (including adjusting bedclothes, sheets and blankets)?: A Little   -   Sitting down on and standing up from a chair with arms (e.g., wheelchair, bedside commode, etc.): A Little   -   M within reach;RN aware of session/findings; All patient questions and concerns addressed;SCDs in place; Family present; Alarm set    ASSESSMENT   Patient is a 80year old female admitted on 10/4/2019 for s/p mechanical fall and right greater trochanter fractur

## 2019-10-06 NOTE — PROGRESS NOTES
[facility]  Progress Note     [employer] Patient Status:  Inpatient    1928 MRN [id number]   [facility] 3SW-A Attending Lola Wyatt MD   Hosp Day # 2 PCP Chanel Murdock     Chief Complaint: Ralph Maher: Patient  Is sitt in the last 168 hours. Imaging: Imaging data reviewed in Epic.     Medications:   • [START ON 10/7/2019] Warfarin Sodium  2.5 mg Oral Once per day on Sun Mon Wed Thu Fri Sat   • Warfarin Sodium  5 mg Oral Once   • furosemide  20 mg Oral Once   • Arf

## 2019-10-06 NOTE — PLAN OF CARE
Plan of care explained and updated with patient input. Progressing per plan of care. Patient is alert and oriented to person, place, time and situation. Confused and forgetful at times, but reminders and reorientation techniques are helpful.  Daughter at be

## 2019-10-06 NOTE — CM/SW NOTE
Noted PT recs for ERIK on 10/5. Sent referrals to [facility] [place], [facility], [address] [facility] as noted in initial SW assessment. REquesting if facilities will allow pts daughter to stay with her. DON requested.    / to re

## 2019-10-06 NOTE — PROGRESS NOTES
Progress Note    Patient: [address] [facility] record #: [id number]    Lidya Morfin is a 80year old  female with right nondisplaced greater trochanter fracture.   The patient's main complaint today is pain at the fracture site but improved toda mg 5 mg Intravenous Q8H PRN   Heparin Sodium (Porcine) 5000 UNIT/ML injection 5,000 Units 5,000 Units Subcutaneous 2 times per day   atenolol (TENORMIN) tab 25 mg 25 mg Oral Daily Beta Blocker   And      hydrochlorothiazide (MICROZIDE) cap 12.5 mg 12.5 mg INR 1.61 10/06/2019         Assessment: 80year old  female right hip greater trochanteric fracture - nondisplaced    Plan:  Plan to treat conservatively  mobilize with PT, WBAT on extremity with walker - no active abduction  pain controlled with meds

## 2019-10-07 NOTE — PROGRESS NOTES
Progress Note    Patient: [address] [facility] record #: [id number]    Joshua Sifuentes is a 80year old  female with right nondisplaced greater trochanter fracture.   The patient's main complaint today is pain at the fracture site but improved toda Q6H PRN   Metoclopramide HCl (REGLAN) injection 5 mg 5 mg Intravenous Q8H PRN   Heparin Sodium (Porcine) 5000 UNIT/ML injection 5,000 Units 5,000 Units Subcutaneous 2 times per day   atenolol (TENORMIN) tab 25 mg 25 mg Oral Daily Beta Blocker   And      hy with walker - no active abduction  pain controlled with meds  Consider dvt ppx given limited mobility - will defer to primary  Rest of care per primary  [facility] for discharge from ortho perspective once cleared from PT    Follow up with Dr. Camilo Giles after 2-3 we

## 2019-10-07 NOTE — PLAN OF CARE
Instructed patient and daughter that patient needs to take a suppository, as patient's last bowel movement was on 10/3.  Patient is forgetful, sometimes confused, usually cooperative and usually reminders, explanations, and reorientation techniques work, bu

## 2019-10-07 NOTE — PLAN OF CARE
Plan of care explained and updated with patient and daughter input. Progressing per plan of care. Patient is forgetful and confused, with reminders and reorientation techniques helpful for reorientation.   On oxygen 2 liters per nasal cannula with continuou

## 2019-10-07 NOTE — PHYSICAL THERAPY NOTE
IP PT attempted x 2. Pt occupied c RT for breathing tx. Maintenance in room for repairs after. Will re-attempt as schedule permits.

## 2019-10-07 NOTE — PROGRESS NOTES
[facility]  Progress Note     Mattie Stein Patient Status:  Inpatient    1928 MRN [id number]   [facility] 3SW-A Attending Chivo Costa MD   Hosp Day # 3 PCP Aggie Muñiz     Chief Complaint: Ailyn Maxwell: Patient  Denies 10/05/19  0455 10/06/19  0506 10/07/19  0436   PTP 18.8* 20.0* 23.0*   INR 1.49* 1.61* 1.91*       No results for input(s): TROP, CK in the last 168 hours. Imaging: Imaging data reviewed in Epic.     Medications:   • sorbitol  30 mL Oral Once   • Wa

## 2019-10-07 NOTE — PHYSICAL THERAPY NOTE
PHYSICAL THERAPY TREATMENT NOTE - INPATIENT    Room Number: 380/380-A     Session: 2   Number of Visits to Meet Established Goals: 5    Presenting Problem: R greater trochanter fracture, nondisplaced.     History related to current admission:  Pt admitted Right adrenal mass ([facility] [address]     Dx in 2011: right adrenal mass measuring between 1 and 1.5 that seems to be an adenoma and  is stable on CT scans   • Shortness of breath    • Subclinical hyperthyroidism     Dx in 6/2013 - due to multinodular goiter   • Type I INPATIENT SHORT FORM - BASIC MOBILITY  How much difficulty does the patient currently have. ..  -   Turning over in bed (including adjusting bedclothes, sheets and blankets)?: None   -   Sitting down on and standing up from a chair with arms (e.g., wheelcha BLE elevated, SCD's placed, alarm set, daughter present. RN made aware of above.         THERAPEUTIC EXERCISES  Lower Extremity Ankle pumps     Upper Extremity      Position Sitting     Repetitions   10     Sets   1     Patient End of Session: Up in chair;N

## 2019-10-07 NOTE — CM/SW NOTE
F/U visit with pt and daughter. They are choosing the [facility] as it is closest to their house.    I explained to dtr that the [facility] has offered to provide her a cot to stay in her mother's room but she is responsible for her own care and meals which she

## 2019-10-07 NOTE — PLAN OF CARE
ALERT ,AWAKE, ORIENTED, [place] NO HEARING. ANXIOUS AND IMPULSIVE. PT NEED INSTRUCTION TOGETHER WITH DAUGHTER AT BS. BED ALARM ENGAGED. DAUGHTER REMINDED INSTRUCTED TO CALL IF PT TRYING TO GET OOB . CALL LIGHT W/IN REACH. CLOSE  MONITOR. PT REFUSED LAXATIVES DEN

## 2019-10-07 NOTE — PLAN OF CARE
RA 95% PT USE O2 AT HOME.  O2 2 LNC TRANSPORT TO [facility], ALERT ,AWAKE, ORIENTED, ACC BY DAUGHTER DAVID AND AMB TECH

## 2019-10-07 NOTE — CM/SW NOTE
ECIN responses;  DIANA PATEL is reviewing. The [address] [place] [address] can accept pt but did not respond if pts dtr can stay with her. [facility] can accept and said pts dtr can stay but they will not provide a bed, there is a recliner in the room.      Addendum; spoke wit

## 2019-10-07 NOTE — PLAN OF CARE
SCRIPT FOR [address] ,REPORT CALLED  [address] AT [facility]. REPORT NOT TO GIVE NARCOTICS, BUT SCRIPT SENT.

## 2019-10-07 NOTE — PLAN OF CARE
SORBITOL ORDER DAUGHTER AT , PT STATES\" I DONT NEED TO TAKE THAT\" DAUGHTER EXPLAINED TO PT ONLY WAY REHAB ACCEPT TO HAVE BM. WILL COME BACK AGAIN PT EATING LUNCH. TO OFFER ABOVE.

## 2019-10-08 NOTE — CM/SW NOTE
10/08/19 0800   Discharge disposition   Expected discharge disposition Skilled Nurs   Name of [address] at [facility]   Discharge transportation [facility] 10/7/19

## 2019-10-08 NOTE — PROGRESS NOTES
Alexandria Jason Author: Carito Whyte MD     1928 MRN [id number]   [facility]  Admission 10/4/19      Last Hospital Discharge 10/7/19 [facility] OF THE [facility] of Discharge  [facility]        CC --admitted to [facility] at [facility], Neuropathy     legs long standing problem   • Nontoxic multinodular goiter     Dx in 6/2013 - thyroid U/S showed right upper nodule of 1.1 cm, right midpole nodule of 3.2 cm and left inferior nodule of 1.6 cm with microcalcifications .  Biopsy of all three quittin.7      Smokeless tobacco: Never Used    Alcohol use: No      Alcohol/week: 0.0 standard drinks      Frequency: Never    Drug use: No      ALLERGIES:    Ace Inhibitors          UNKNOWN  Allergy                 RASH    Comment:Occurs sometimes questions    VITALS:  There were no vitals taken for this visit.     PHYSICAL EXAM:  Not done      DIAGNOSTICS REVIEWED AT THIS VISIT:    ASSESSMENT AND PLAN:  Diagnoses and all orders for this visit:    Closed fracture of right hip, initial encounter ([email or web address]ca 75.)

## 2019-10-09 PROBLEM — F32.9 MAJOR DEPRESSION: Status: ACTIVE | Noted: 2019-01-01

## 2019-10-09 NOTE — DISCHARGE SUMMARY
[facility] HOSPITALIST  DISCHARGE SUMMARY     Lovely Bates Patient Status:  Inpatient    1928 MRN [id number]   [facility] 3SW-A Attending No att. providers found   Hosp Day # 3 PCP Jaimee Ventura     Date of Admission: 10/4/2019  Date Atenolol-Chlorthalidone 50-25 MG Tabs  Commonly known as:  TENORETIC      Take 0.5 tablets by mouth daily. Refills:  0     DULoxetine HCl 60 MG Cpep  Commonly known as:  CYMBALTA      Take 1 capsule (60 mg total) by mouth daily.    Quantity:  90 capsule None          Vital signs:       Physical Exam:    General: No acute distress. Respiratory: Clear to auscultation bilaterally. No wheezes. No rhonchi. Cardiovascular: S1, S2. Regular rate and rhythm. No murmurs, rubs or gallops.    Abdomen: Soft, nontend
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
## 2019-10-09 NOTE — ED NOTES
Received call from BETTY longoria Rn at [facility]. Hailey Naylor working on bed at [facility]. Waiting for bed placement.

## 2019-10-09 NOTE — ED NOTES
Family at bedside. Family updated regarding transfer to [facility]. Waiting for a bed to become available.

## 2019-10-09 NOTE — ED NOTES
Pts daughter verbalized frustration of wait for a bed at [facility]. Continuing to wait for bed assignment at [facility].

## 2019-10-09 NOTE — ED NOTES
Gave verbal report to [facility] RN. Pt to be admitted to [facility]

## 2019-10-09 NOTE — ED NOTES
Family member at nurses station stating patient needs her oxygen. SpO2 96%. Patient gaggins, offered emesis basin. Patient states she needs to [facility] home and get to bed. \"

## 2019-10-10 PROBLEM — R41.82 ALTERED MENTAL STATUS: Status: ACTIVE | Noted: 2019-01-01

## 2019-11-14 NOTE — CM/SW NOTE
Spoke to the patient, son Raul Merlin), daughter Pily Patel), and granddaughter Tonie Land). Care has been more difficult for the pateint and she currently lives with her daughter Chris Washington. I spoke to the patient who would like to go home.  She states that she uses a walk

## 2019-11-14 NOTE — ED PROVIDER NOTES
I reviewed that chart and discussed the case. I have examined the patient and noted report of a trip and fall earlier today, no significant trauma on exam, neurologically intact, x-rays of the lower back show worsening of chronic compression fracture L3.

## 2019-11-15 NOTE — PROGRESS NOTES
11/15/19 0021   Clinical Encounter Type   Visited With Patient not available  (Medical team in with patient.  No family present at this time. )   Routine Visit Introduction   Crisis Visit ED   Referral From Other (Comment)  (Page)   Referral To

## 2019-11-15 NOTE — ED PROVIDER NOTES
Patient Seen in: [facility] Emergency Department      History   Patient presents with:  Cardiac Arrest (cardiovascular)    Stated Complaint: full arrest    HPI    Patient is a 80-year-old female presenting to the emergency department via EMS in full All other components within normal limits   RAINBOW DRAW BLUE   RAINBOW DRAW LAVENDER   RAINBOW DRAW LIGHT GREEN   RAINBOW DRAW GOLD            MDM     Resuscitation continued per his ACLS protocol.   I gel airway was switched out for endotracheal intuba

## 2019-11-15 NOTE — ED NOTES
Family is currently with the patient, body has been released by  (see paper charting). Joy Cotton RN awaiting family readiness for patient to be transferred the [facility].

## 2019-11-15 NOTE — PROGRESS NOTES
11/15/19 0025   Clinical Encounter Type   Visited With Family  (Ministered to familly in grief. Empathetic listening, prayer, Bible reading.  Made sure family had comfort care essentials, beverages, kleenex.)   Routine Visit Follow-up   Crisis Visit Deat

## 2019-11-15 NOTE — ED PROVIDER NOTES
Patient Seen in: [facility] Emergency Department      History   Patient presents with:  Trauma (cardiovascular, musculoskeletal)    Stated Complaint: fall/on blood thinner    HPI    CHIEF COMPLAINT: Fall at home     HISTORY OF PRESENT ILLNESS: Erendira (chronic obstructive pulmonary disease) (HCC)    • Disorder of thyroid    • Diverticulosis of colon (without mention of hemorrhage)    • Essential hypertension    • Fitting and adjustment of cardiac pacemaker    • HIGH BLOOD PRESSURE    • Hypertension    • Smoking status: Former Smoker        Packs/day: 2.00        Years: 52.00        Pack years: 80        Quit date: 1998        Years since quittin.8      Smokeless tobacco: Never Used    Alcohol use: No      Alcohol/week: 0.0 standard drinks      F ecchymosis.   Extremities are symmetrical, full range of motion  Psychiatric: calm and cooperative    ED Course     Labs Reviewed   COMP METABOLIC PANEL (14) - Abnormal; Notable for the following components:       Result Value    Glucose 148 (*)     BUN 28 10/08/2019, 17:36.  TECHNIQUE:  PA and lateral chest radiographs were obtained. PATIENT STATED HISTORY: (As transcribed by Technologist)  Patient offered no additional history at this time. FINDINGS:  Tortuous thoracic aorta with calcified plaque.   Mil 11/14/2019 at 14:50     Approved by: Clive Rudd MD on 11/14/2019 at 14:54          Ct Brain Or Head (71797)    Result Date: 11/14/2019  PROCEDURE:  CT BRAIN OR HEAD (66617)  COMPARISON:  VADIM BLUNT, CT BRAIN OR HEAD ([id number]), 7/11/2018, 13:02.   Bandar Moran Lower back and lower left side back pain after fall. FINDINGS:  Pacemaker leads are partially imaged. Osteopenia. There are stable chronic compression deformities of some of the lower thoracic vertebra.   There is age-indeterminate interval worsening o Results were reviewed with patient's family. They would like her discharge home. They have appointments next week to determine long-term placement. Discharge the patient home as requested by family.   If there are new, changing or worsening symptoms retu

## 2019-11-15 NOTE — ED NOTES
Called Dr. Octavio Hernandez answering service. Left a message with them to notify them of the death of his patient.

## 2020-04-28 NOTE — TELEPHONE ENCOUNTER
[phone] and spoke with pt's daughter, Emilia [facility] per HIPAA), expressed slight aggravation as already spoke with someone today regarding this and explained again, will need to speak with family to figure out day/time to come in, once this is fig Alert and oriented, no focal deficits, no motor or sensory deficits.

## 2021-02-11 NOTE — PLAN OF CARE
Problem: Impaired Functional Mobility  Goal: Achieve highest/safest level of mobility/gait  Description  Interventions:  - Assess patient's functional ability and stability  - Promote increasing activity/tolerance for mobility and gait  - Educate and eng 2 seconds or less

## 2023-10-03 NOTE — ED PROVIDER NOTES
Patient Seen in: [facility] Emergency Department      History   Patient presents with:  Altered Mental Status (neurologic)    Stated Complaint: ams from nsg hm    HPI    27-year-old female comes to the hospital with a complaint of having difficulty nausea and vomiting)    • Right adrenal mass ([address]     Dx in 2011: right adrenal mass measuring between 1 and 1.5 that seems to be an adenoma and  is stable on CT scans   • Shortness of breath    • Subclinical hyperthyroidism     Dx in 6/2013 - due to mult PEERL, throat clear, neck supple, no JVD, trachea midline, No LAD  Heart: S1S2 normal. No murmurs, regular rate and rhythm  Lungs: Bibasilar crackles are noted without station bilaterally  Abdomen: Soft nontender nondistended normal active bowel sounds wit EKG    Rate, intervals and axes as noted on EKG Report.   Rate: 75  Rhythm: Paced  Reading: Agreed              Xr Ankle (min 3 Views), Left (cpt=73610)    Result Date: 10/4/2019  PROCEDURE:  XR ANKLE (MIN 3 VIEWS), LEFT (CPT=73610)  TECHNIQUE:  Three v bilateral knee pain following a  mechanical fall at home this evening. Pt denies loc, denies head or neck  injury or pain. CONCLUSION:  There is tricompartment arthropathy most severe involving the medial compartment. There is chondrocalcinosis. PORTABLE  (CPT=71045), 4/25/2019, 18:46. INDICATIONS:  ams from nsg hm  PATIENT STATED HISTORY: (As transcribed by Technologist)  Patient presents from nursing home with AMS. FINDINGS:  Lungs appear mildly hyperexpanded which may represent COPD.   Stabl Stable chronic changes. No rib fracture.     Dictated by: Suman Sommer MD on 10/04/2019 at 8:11     Approved by: Suman Sommer MD            Xr Hip W Or Wo Pelvis 2 Or 3 Views, Right (cpt=73502)    Result Date: 10/4/2019  PROCEDURE:  XR HIP W OR cleared from her but will need psychiatric consult for further and final disposition of this patient        MDM     As above              Disposition and Plan     Clinical Impression:  Aggressive behavior  (primary encounter diagnosis)    Disposition:   The Never smoker

## 2025-03-13 NOTE — H&P
MER[facility] HOSPITALIST  History and Physical     Omar Peers Patient Status:  Observation    1928 MRN [id number]   [facility] 3SW-A Attending Andrew Gauthier MD   Hosp Day # 0 PCP Aubrie Aguirre MD     Chief Complaint: back pain    Hi What Type Of Note Output Would You Prefer (Optional)?: Standard Output How Severe Is Your Skin Lesion?: mild of 1.6 cm with microcalcifications .  Biopsy of all three benign   • Osteoarthrosis, unspecified whether generalized or localized, unspecified site    • Pacemaker     Pacemaker placed in 2004 at [facility]   • PONV (postoperative nausea and vomiting)    • Right C*        Comment:Unsure reaction  Msg [Monosodium Glu*    OTHER (SEE COMMENTS)    Comment:Watering eyes and \"acts out\"  Naproxen                DIARRHEA  Percocet [Oxycodone*    RASH  Ultracet [Tramadol-*    RASH    Medications:    No current facility-a Has Your Skin Lesion Been Treated?: not been treated Is This A New Presentation, Or A Follow-Up?: Skin Lesions distress. Alert and oriented x 3. HEENT: Normocephalic atraumatic. Moist mucous membranes. EOM-I. PERRLA. Anicteric. Neck: No lymphadenopathy. No JVD. No carotid bruits. Respiratory: diminished. No wheezes. No rhonchi.   Cardiovascular: S1, S2. Regular r

## (undated) DEVICE — Device: Brand: DEFENDO AIR/WATER/SUCTION AND BIOPSY VALVE

## (undated) DEVICE — 3M™ RED DOT™ MONITORING ELECTRODE WITH FOAM TAPE AND STICKY GEL, 50/BAG, 20/CASE, 72/PLT 2570: Brand: RED DOT™

## (undated) DEVICE — MEDI-VAC SUCTION HANDLE REGULAR CAPACITY: Brand: CARDINAL HEALTH

## (undated) DEVICE — 1200CC GUARDIAN II: Brand: GUARDIAN

## (undated) DEVICE — MEDI-VAC NON-CONDUCTIVE SUCTION TUBING: Brand: CARDINAL HEALTH

## (undated) DEVICE — ENDOSCOPY PACK - LOWER: Brand: MEDLINE INDUSTRIES, INC.

## (undated) NOTE — Clinical Note
FYI, TCM call made, see notes. NCM attempted to schedule TCM HFU patient daughter Suzzanne Leventhal states she will call to schedule an appointment when she finds out who can drive them to the appointment. Message sent to MD's office.

## (undated) NOTE — IP AVS SNAPSHOT
Patient Demographics     Address  [address] [place] [address]  [place] [address] Phone  [phone] (Home) *Preferred*  [phone] [facility]) E-mail Address  [email or web address]      Emergency Contact(s)     Name Relation Home Work Mobile    [place] Daughter 6 Commonly known as:  NORCO      Take 1 tablet by mouth every 8 (eight) hours as needed for Pain. Marin Vann MD         ipratropium-albuterol 0.5-2.5 (3) MG/3ML Soln  Commonly known as:  DUONEB      Take 3 mL by nebulization 4 (four) times daily.    Carlos Jin [id number] hydrochlorothiazide (MICROZIDE) cap 12.5 mg (And Linked Group #1) 10/07/19 0420 Given      [id number] ipratropium-albuterol (DUONEB) nebulizer solution 3 mL 10/06/19 2022 Given      [id number] ipratropium-albuterol (DUONEB) nebulizer solution 3 mL Specimen Information    Type Source Collected On   Blood — 10/07/19 0436          Components    Component Value Reference Range Flag Lab   Potassium 4.3 3.5 - 5.1 mmol/L — [facility]            Testing Performed By     Lab - Abbreviation Name Director Add Dx in 6/2013 - TPO Ab positive   • Compression fracture of thoracic vertebra (HCC)     Thoracic vertebral body - T12 fracture seen on  CT scan of chest   • Congenital deformity of limb     right arm   • COPD    • COPD (chronic obstructive pulmonary diseas Right wrist fracture repair in 2008   • OTHER SURGICAL HISTORY      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 Take 2.5 mg on Monday, Wednesday, Thursday, Friday, Saturday, Sunday  Disp:  Rfl:    Warfarin Sodium 2.5 MG Oral Tab Take 5 mg by mouth See Admin Instructions.  Take 5mg on Tuesday  Disp:  Rfl:    Atenolol-Chlorthalidone 50-25 MG Oral Tab Take GFRNAA 28*   CA 8.4*   ALB 3.5      K 4.0      CO2 29.0   ALKPHO 117   AST 18   ALT 15   BILT 0.2   TP 6.9       Estimated Creatinine Clearance: 20.8 mL/min (A) (based on SCr of 1.58 mg/dL (H)).     Recent Labs   Lab 10/04/19  0051   PTP 18.8* Patient Identification:        Name: Ny Nunes  Age: 80year old  Sex: female  :  1928  MRN: [id number]                                              Requesting Physician: Dr. Richard Marino      HPI:        Ny Nunes is a 80year old year old Diabetes mellitus type 2 in nonobese (HCC)     At risk for falling     CKD (chronic kidney disease), stage III (HCC)     History of left breast cancer     Chronic atrial fibrillation     Malignant neoplasm of left lung (HCC)     Anxiety     COPD exacerb • Atrial fibrillation (HCC)    • Breast cancer (HCC)     Left breast cancer tx with mastectomy - no chemo or radiation   • Cancer (HCC)     BREAST AND LUNG   • Chronic lymphocytic thyroiditis     Dx in 6/2013 - TPO Ab positive   • Compression fracture of t Performed by Rita Friedman MD at [facility] ENDOSCOPY   • YOAV LOCALIZATION WIRE 1 SITE RIGHT (TAN=[id number])     • MASTECTOMY LEFT      Left mastectomy at age 54   •       x 3   • OTHER SURGICAL HISTORY      Right wrist fracture repair in    • OTHER MAVIS daily for 3 days then 3 tablets daily for 3 days then 2 tablets daily for 3 days then 1 tablets daily for 3 days then stop. Disp: 65 tablet Rfl: 0 More than a month at Unknown time   Warfarin Sodium 2.5 MG Oral Tab Take 1.25 mg by mouth daily.  Take 5mg on Iv Dye [Radiology C*        Comment:Unsure reaction  Msg [Monosodium Glu*    OTHER (SEE COMMENTS)    Comment:Watering eyes and \"acts out\"  Naproxen                DIARRHEA  Percocet [Oxycodone*    RASH  Ultracet [Tramadol-*    RASH    Social History:   S Distal pulses intact  No calf swelling  Chidi's sign negative  Compartments soft  No signs or symptoms of DVT or compartment syndrome    Exam of the contralateral hip is normal:  No atrophy, erythema, ecchymosis, or gross deformity noted  No tenderness to Discussed that she can be protected weight bearing as tolerated with a walker, should have no active abduction to limit fracture displacement  Ok for diet as no acute surgical intervention  Ok for blood thinner per primary  Pain per primary  Rest of care p Performed by Rita Friedman MD at [facility] ENDOSCOPY   • YOAV LOCALIZATION WIRE 1 SITE RIGHT (GPI=[id number])     • MASTECTOMY LEFT      Left mastectomy at age 54   •       x 3   • OTHER SURGICAL HISTORY      Right wrist fracture repair in    • OTHER MAVIS Raw Score: 17   Approx Degree of Impairment: 50.57%   Standardized Score (AM-PAC Scale): 42.13   CMS Modifier (G-Code): CK    FUNCTIONAL ABILITY STATUS  Gait Assessment   Gait Assistance: Minimum assistance  Distance (ft): 2,2  Assistive Device: Rolling wa A & O x 1. Pt does not believe she is in hospital, and states she has been here for 4 hours, although daughter and therapist attempt to reorient pt.    Pt insists she has just arrived and does not know why she is in hospital.  Pt has poor insight into defic Author:  Swati Alexander PT Service:  — Author Type:  Physical Therapist    Filed:  10/6/2019 11:46 AM Date of Service:  10/6/2019 11:21 AM Status:  Signed    :  Swati Alexander PT (Physical Therapist)        PHYSICAL THERAPY TREATMENT NOTE - INPA • Osteoarthrosis, unspecified whether generalized or localized, unspecified site    • Pacemaker     Pacemaker placed in 2004 at [facility]   • PONV (postoperative nausea and vomiting)    • Right adrenal mass ([facility] [address]     Dx in 2011: right adrenal mass measuring b Dynamic Standing: Poor -    ACTIVITY TOLERANCE                         O2 WALK        SPO2 Ambulation on Oxygen: 93  Ambulation oxygen flow (liters per minute): 2        AM-PAC '6-Clicks' INPATIENT SHORT FORM - BASIC MOBILITY  How much difficulty does the skilled PT. Frequent reminders and redirection needed throughout. THERAPEUTIC EXERCISES  Lower Extremity Ankle pumps     Upper Extremity      Position Sitting     Repetitions   10     Sets   1     Patient End of Session: Up in chair; With [facility] staff;Needs Therapist    Filed:  10/5/2019 10:27 AM Date of Service:  10/5/2019 10:07 AM Status:  Signed    :  Janel Gutierrez PT (Physical Therapist)             PHYSICAL THERAPY EVALUATION - INPATIENT     Room Number: 380/380-A  Evaluation Date: 10/5/2019 Past Medical History  Past Medical History:   Diagnosis Date   • Arrhythmia     Atrial Fib   • Atrial fibrillation ([facility] [address]    • Breast cancer (HCC)     Left breast cancer tx with mastectomy - no chemo or radiation   • Cancer (HCC)     BREAST AND LUNG   • Chr Bilateral cataract extractions   • COLONOSCOPY     • ESOPHAGOGASTRODUODENOSCOPY (EGD) N/A 9/12/2017    Performed by Nicole Sharma MD at [facility] ENDOSCOPY   • [facility] [address] 1 SITE RIGHT ([id number])     • MASTECTOMY LEFT      Left mastectomy at age · Following Commands:  follows one-step commands inconsistently, follows one step commands with increased time and follows one step commands with repetition  · Initiation: cues to initiate tasks  · Safety Judgement:  decreased awareness of need for assista Gait Assistance: Dependent assistance  Distance (ft): 2  Assistive Device: Rolling walker  Pattern: R Decreased stance time  Stoop/Curb Assistance: Not tested  Comment : Above FIM scores are defined per dept policy    Skilled Therapy Provided: PT orders re Research supports that patients with this level of impairment may benefit from DC to ERIK to optimize return to PLOF. Clarence Mcknight Based on this evaluation, patient's clinical presentation is stable and overall the evaluation complexity is considered moderate.   Thes

## (undated) NOTE — ED AVS SNAPSHOT
Keshawn Mckinnon   MRN: [id number]    Department:  [facility] Emergency Department   Date of Visit:  11/14/2019           Disclosure     Insurance plans vary and the physician(s) referred by the ER may not be covered by your plan.  Please contact y tell this physician (or your personal doctor if your instructions are to return to your personal doctor) about any new or lasting problems. The primary care or specialist physician will see patients referred from the [facility] Emergency Department.  Viktor Workman

## (undated) NOTE — IP AVS SNAPSHOT
[address]            (For Outpatient Use Only) Initial Admit Date: 10/4/2019   Inpt/Obs Admit Date: Inpt: 10/4/19 / Obs: N/A   Discharge Date:    Gissell Proper:  [de-identified]   MRN: [de-identified]   CSN: [id number]   CEID: [id number] Subscriber ID:  Pt Rel to Subscriber:    Hospital Account Financial Class: Medicare    October 7, 2019

## (undated) NOTE — Clinical Note
Pt currently does not have her HFU appointment scheduled. An appt was offered however the dtr declined at this time stating she is still working on transportation. Dtr stated the pt's SOB and cough is improving. Pt does use 2L oxygen at home.  Pt's dtr has

## (undated) NOTE — LETTER
[facility]  Yoseph Martinez 61 [address] [facility], [address] [place] [address]    Consent for Operation    Date: __________________    Time: _______________    1.  I authorize the performance upon Keshawn Mckinnon the following operation:    Procedure(s):    tal videotape. [facility] will not be responsible for storage or maintenance of this tape. 6. For the purpose of advancing medical education, I consent to the admittance of observers to the Operating Room.     7. I authorize the use of any specimen, organs Signature of Patient:   ___________________________    When the patient is a minor or mentally incompetent to give consent:  Signature of person authorized to consent for patient: ___________________________   Relationship to patient: _____________________ drugs/illegal medications). Failure to inform my anesthesiologist about these medicines may increase my risk of anesthetic complications. · If I am allergic to anything or have had a reaction to anesthesia before.     3. I understand how the anesthesia med I have discussed the procedure and information above with the patient (or patient’s representative) and answered their questions. The patient or their representative has agreed to have anesthesia services.     _______________________________________________

## (undated) NOTE — ED AVS SNAPSHOT
[facility] Emergency Department    [place]  [address] [place] [address]    Phone:  [phone]    Fax:  [address]   MRN: [id number]    Department:  [facility] Emergency Department   Date of Visit:  1/1 Keep the splint clean and dry. Use the medications as prescribed. Return immediately if you develop any worrisome symptoms. Otherwise follow-up with orthopedics by calling the office tomorrow.     Discharge References/Attachments     FRACTURE, UPPER EXTR IF THERE IS ANY CHANGE OR WORSENING OF YOUR CONDITION, CALL YOUR PRIMARY CARE PHYSICIAN AT ONCE OR RETURN IMMEDIATELY TO THE EMERGENCY DEPARTMENT.     If you have been prescribed any medication(s), please fill your prescription right away and begin taking t Patient [address] to help you get signed up for insurance coverage. Patient [address] is a Federal Navigator program that can help with your Affordable Care Act coverage, as well as all types of Medicaid plans.   To get signed up and covere

## (undated) NOTE — Clinical Note
HANNA TCM call made, see notes. Antonia Sanchez pt's daughter states she will not be able to get her to the doctor's office as she has not way to transport her. Message sent to MD's office with condition update.

## (undated) NOTE — IP AVS SNAPSHOT
[address]            (For Outpatient Use Only) Initial Admit Date: 7/24/2018   Inpt/Obs Admit Date: Inpt: N/A / Obs: 07/24/18   Discharge Date: 7/27/2018   Hospital Acct:  [de-identified]   MRN: [de-identified]   CSN: [id number]        [facility] Financial Class: Medicare    July 27, 2018

## (undated) NOTE — ED AVS SNAPSHOT
Lenin Newton   MRN: [id number]    Department:  [facility] Emergency Department   Date of Visit:  4/25/2019           Disclosure     Insurance plans vary and the physician(s) referred by the ER may not be covered by your plan.  Please contact yo tell this physician (or your personal doctor if your instructions are to return to your personal doctor) about any new or lasting problems. The primary care or specialist physician will see patients referred from the [facility] Emergency Department.  Lexus Slater

## (undated) NOTE — IP AVS SNAPSHOT
Patient Demographics     Address  [address] [place] [address]  [place] [address] Phone  [phone] (Home) *Preferred*  [phone] [facility]) E-mail Address  [email or web address]      Emergency Contact(s)     Name Relation Home Work Mobile    [place] Daughter 6 · Wear a T-shirt under the brace to protect your skin and absorb sweat. · Check for skin irritation and reddened areas after wearing the brace. You may need to have your brace adjusted, or you may need a different size.   Follow-up care  Follow up with you Take 1 tablet (4 mg total) by mouth every 8 (eight) hours as needed for Nausea.    Reyes Gill MD         SYMBICORT 160-4.5 MCG/ACT Aero  Generic drug:  Budesonide-Formoterol Fumarate  Next dose due:  07/27 @ 2100      Inhale 2 puffs into the lungs 2 (t [id number] ondansetron (ZOFRAN-ODT) disintegrating tab 4 mg (Or Linked Group #3) 07/26/18 1209 Given                    Recent Vital Signs    Flowsheet Row Most Recent Value   Vitals  131/59 Filed at 07/27/2018 1159   Pulse  64 Filed at 07/27/2018 1159   R PMH afib, L breast ca s/p mastectomy, chronic lymphocytic thyroiditis, COPD on Home O2, HTN, lung cancer, DM2 who p/w back pain. Pt had a mechanical fall a week ago. Hit her back on the cupboard. Had CT which was negative for fx at that time.  Pt was dc'ed Dx in 2011: right adrenal mass measuring between 1 and 1.5 that seems to be an adenoma and  is stable on CT scans   • Subclinical hyperthyroidism     Dx in 6/2013 - due to multinodular goiter   • Type II or unspecified type diabetes mellitus without menti No current facility-administered medications on file prior to encounter. Current Outpatient Prescriptions on File Prior to Encounter:  Hydrocodone-Acetaminophen 5-300 MG Oral Tab Take 1 tablet by mouth every 8 (eight) hours as needed.  Disp: 20 tablet Rfl Cardiovascular: S1, S2. Regular rate and rhythm. No murmurs, rubs or gallops. Equal pulses. Chest and Back: No tenderness or deformity. Abdomen: Soft, nontender, nondistended. Positive bowel sounds. No rebound, guarding or organomegaly.   Neurologic: No Author:  Fang Del Valle MD Service:  Hospitalist Author Type:  Physician    Filed:  7/24/2018 10:10 PM Date of Service:  7/24/2018  9:50 PM Status:  Signed    :  Fang Del Valle MD (Physician)    Related Notes:  Addendum by Fang Del Valle MD (Physici CT scan of chest from 8/2013 - Right lung upper lobe mass measuring 1.2 cm   • Macular degeneration     Receiving injections   • Neuropathy     legs long standing problem   • Nontoxic multinodular goiter     Dx in 6/2013 - thyroid U/S showed right upper n Thyroid cancer - thyroidectomy but no MILES   • Cancer Daughter      Thyroid cancer - tx with thyroidectomy but no MILES   • Thyroid Disorder Other      Niece with benign thyroid nodules   • Breast Cancer Self 55       Allergies:    Ace Inhibitors          UN Take 2.5mg on Sunday Disp:  Rfl:        Review of Systems:   A comprehensive 14 point review of systems was completed. Pertinent positives and negatives noted in the HPI.     Physical Exam:    /44 (BP Location: Right arm)   Pulse 98   Temp 98.6 °F 2. Pain control, increase norco to 10mg  3. PT/OT  4. Suspect pain is muscular. Consider ortho consult in AM if pain does not improve  5.  Cont home meds    Quality:  · DVT Prophylaxis: coumadin  · CODE status: full  · Howell: no    Plan of care discussed wi morphology, initial encounter [facility])  Active Problems:    Renal insufficiency    Subtherapeutic international normalized ratio (INR)    Intractable low back pain    Chronic respiratory failure with hypoxia (HCC)    Closed fracture of third lumbar vertebra ( • Type II or unspecified type diabetes mellitus without mention of complication, not stated as uncontrolled     Type 2 DM - dx at age 76   • Visual impairment        Past Surgical History  Past Surgical History:  No date: APPENDECTOMY  No date: CATARACT -   Moving from lying on back to sitting on the side of the bed?: A Little   How much help from another person does the patient currently need. ..   -   Moving to and from a bed to a chair (including a wheelchair)?: A Little   -   Need to walk in hospital r includes low endurance,COPD - Home O2 dependent,Type II DM. Patient continues to demonstrate need for min assist during functional mobility + demonstrated poor standing & gait balance with poor safety awareness. Patient is @ high risk of falls.  Patient richard breathing treatment first. On second attempt 30 min later, pt had returned to supine and was sleeping. Pt dtr requests to allow pt to rest and check back in am. Discussed importance of mobility and encouraged pt to ambulate later in day with [facility] staff.  Dtr approx two week ago on 7/11/18 and was d/c to home after one night stay. A PT eval was complete at that time and pt was mod I with mobility and able to ambualte 5ft with cane in room.  Pt returns to hospital now due to increasing difficulty managing pain ov Dx in 6/2013 - thyroid U/S showed right upper nodule of 1.1 cm, right midpole nodule of 3.2 cm and left inferior nodule of 1.6 cm with microcalcifications .  Biopsy of all three benign   • Osteoarthrosis, unspecified whether generalized or localized, unspe been requiring increasing care due to increasing pain and weakness per dtr. Pt dtr reports her own health issues of multiple falls and orthostatic hypotension, but reports she has been able to assist pt as needed in home.     SUBJECTIVE  \"It hurts more now How much difficulty does the patient currently have. ..  -   Turning over in bed (including adjusting bedclothes, sheets and blankets)?: A Little   -   Sitting down on and standing up from a chair with arms (e.g., wheelchair, bedside commode, etc.): A Yoseftl aware of session/findings; All patient questions and concerns addressed; Family present; Discussed recommendations with /    ASSESSMENT   Patient is a 80year old female admitted on 7/24/2018 for L3 compression fx. .  Pertinent comorbi Rehab Potential : Fair  Frequency (Obs): Daily  Number of Visits to Meet Established Goals: 5      CURRENT GOALS    Goal #1 Patient is able to demonstrate supine - sit EOB @ level: supervision     Goal #2 Patient is able to demonstrate transfers Sit to/fro DC'd last week for back pain due to falling at home, imaging did not show fracture. CT from 7/24/18 shows L3 compression fracture. PMH includes: COPD, anxiety, a fib, h/o cancer, a fib, macular degeneration, neuropathy. See details below. [MM.2]      CONCL AM-PAC ‘6-Clicks’ Inpatient Daily Activity Short Form  How much help from another person does the patient currently need…[MM.1]  -   Putting on and taking off regular lower body clothing?: A Lot  -   Bathing (including washing, rinsing, drying)?: A Lot  - and has limited endurance due to COPD. Patient is recommended to go to sub acute rehab as she is below her baseline, however she and her daughter who is caregiver have declined [facility] and wish to return home. Patient will need 24 hour supervision /assistance. History related to current admission:[MM.1]   Admitted with intractable back pain on 7/24. Patient had been admitted and [address] last week for back pain due to falling at home, imaging did not show fracture. CT from 7/24/18 shows L3 compression fracture.   [facility] microcalcifications .  Biopsy of all three benign   • Osteoarthrosis, unspecified whether generalized or localized, unspecified site    • Pacemaker     Pacemaker placed in 2004 at [facility]   • PONV (postoperative nausea and vomiting)    • Right adrenal mass ( SUBJECTIVE[MM.1]   :I can't today, I'm sick\"[MM. 2]    Patient self-stated goal is[MM. 1] to have less pain[MM. 2]    OBJECTIVE  Precautions: Spine  Fall Risk: High fall risk    WEIGHT BEARING RESTRICTION  Weight Bearing Restriction: None                PAIN Supine to Sit : Minimum assistance  Sit to Stand: Minimum assistance    Skilled Therapy Provided:[MM.1] UEs and cognition briefly assessed in supine. OT educated patient on OT role.  Max A needed to don socks, min a for trunk for bed mobility and min A for Subacute rehab is recommended for 7-10 days. After this period of rehabilitation patient should achieve supervision level in basic ADLs[MM. 2]    Patient Complexity  Occupational Profile/Medical History[MM. 1] MODERATE[MM.2]   Specific performance deficits Presenting Problem:  (L3 compression fracture)    Physician Order: IP Consult to Occupational Therapy  Reason for Therapy: ADL/IADL Dysfunction and Discharge Planning    History related to current admission:[MM.1]   Admitted with intractable back pain on 7 Lives with her dtr who provides IADL assistance. Neither the patient nor her dtr drives, pt's son assists with transportation. [MM.2]     SUBJECTIVE[MM.1]   :I can't today, I'm sick\"[MM. 2]    Patient self-stated goal is[MM. 1] to have less pain[MM. 2]    OBJ CMS Modifier (G-Code): CK    FUNCTIONAL TRANSFER ASSESSMENT  Supine to Sit : Minimum assistance  Sit to Stand: Minimum assistance    Skilled Therapy Provided:[MM.1] UEs and cognition briefly assessed in supine. OT educated patient on OT role.  Mark scruggs impairment often benefit from SNF/LTAC. Subacute rehab is recommended for 7-10 days. After this period of rehabilitation patient should achieve supervision level in basic ADLs[MM. 2]    Patient Complexity  Occupational Profile/Medical History[MM. 1] WHITA to it today. Nurse aware and getting medication for nausea. Daughter present and states that she is with the patient all the time at home. Will re-attempt later as time permits. [MM.1]      Attribution Gerber    MM. 1 - Khushboo Terrazas OT on 7/25/2018 11:25

## (undated) NOTE — ED AVS SNAPSHOT
Mak Espinoza   MRN: [id number]    Department:  [facility] Emergency Department   Date of Visit:  8/16/2017           Disclosure     Insurance plans vary and the physician(s) referred by the ER may not be covered by your plan.  Please contact yo If you have been prescribed any medication(s), please fill your prescription right away and begin taking the medication(s) as directed    If the emergency physician has read X-rays, these will be re-interpreted by a radiologist.  If there is a significant

## (undated) NOTE — LETTER
[facility]  Yoseph Martinez 61 [address] [facility], [address] [place] [address]    Consent for Operation    Date: __________________    Time: _______________    1.  I authorize the performance upon Mak Band the following operation:    Procedure(s):  colonoscopy, esopho procedure has been videotaped, the surgeon will obtain the original videotape. The hospital will not be responsible for storage or maintenance of this tape.     6. For the purpose of advancing medical education, I consent to the admittance of observers to t STATEMENTS REQUIRING INSERTION OR COMPLETION WERE FILLED IN.     Signature of Patient:   ___________________________    When the patient is a minor or mentally incompetent to give consent:  Signature of person authorized to consent for patient: ____________ supplements, and pills I can buy without a prescription (including street drugs/illegal medications). Failure to inform my anesthesiologist about these medicines may increase my risk of anesthetic complications.   · If I am allergic to anything or have had Anesthesiologist Signature     Date   Time  I have discussed the procedure and information above with the patient (or patient’s representative) and answered their questions. The patient or their representative has agreed to have anesthesia services.     ___

## (undated) NOTE — Clinical Note
Patient is coming for hospital follow up on 9.21.17. Patient stated she is not feeling any better since being d/c'ed and that she has medication concerns. A TE was sent to triage to address.  Encouraged pt to reschedule her HFU sooner however pt would not

## (undated) NOTE — LETTER
Sabine Carrillo [address]  [place], [address] [facility]    Consent for Operation  Date: __________________                                Time: _______________    1.  I authorize the performance upon Mak Band the following operation:  Right Hi videotape. [facility] will not be responsible for storage or maintenance of this tape. 6. For the purpose of advancing medical education, I consent to the admittance of observers to the Operating Room.   7. I authorize the use of any specimen, organs, ti When the patient is a minor or mentally incompetent to give consent:  Signature of person authorized to consent for patient: ___________________________   Relationship to patient: ____________________________________________________    Signature of Witness these medicines may increase my risk of anesthetic complications. iv. If I am allergic to anything or have had a reaction to anesthesia before. 3. I understand how the anesthesia medicine will help me (benefits).   4. I understand that with any type of an patient’s representative) and answered their questions. The patient or their representative has agreed to have anesthesia services.     _____________________________________________________________________________  Witness        Date   Time  I have jorden

## (undated) NOTE — ED AVS SNAPSHOT
Cortney James   MRN: [id number]    Department:  [facility] Emergency Department   Date of Visit:  9/17/2017           Disclosure     Insurance plans vary and the physician(s) referred by the ER may not be covered by your plan.  Please contact yo If you have been prescribed any medication(s), please fill your prescription right away and begin taking the medication(s) as directed    If the emergency physician has read X-rays, these will be re-interpreted by a radiologist.  If there is a significant

## (undated) NOTE — LETTER
[facility]  Yoseph Martinez 61 2001 [facility], [address] [place] [address]    Consent for Operation    Date: __________________    Time: _______________    1.  I authorize the performance upon Mak Band the following operation:    Procedure(s):   ESOPHAGOGASTRODUOD videotape. The [facility] will not be responsible for storage or maintenance of this tape. 6. For the purpose of advancing medical education, I consent to the admittance of observers to the Operating Room.     7. I authorize the use of any specimen, organs Signature of Patient:   ___________________________    When the patient is a minor or mentally incompetent to give consent:  Signature of person authorized to consent for patient: ___________________________   Relationship to patient: _____________________ drugs/illegal medications). Failure to inform my anesthesiologist about these medicines may increase my risk of anesthetic complications. · If I am allergic to anything or have had a reaction to anesthesia before.     3. I understand how the anesthesia med I have discussed the procedure and information above with the patient (or patient’s representative) and answered their questions. The patient or their representative has agreed to have anesthesia services.     _______________________________________________